# Patient Record
Sex: MALE | Race: OTHER | Employment: FULL TIME | URBAN - METROPOLITAN AREA
[De-identification: names, ages, dates, MRNs, and addresses within clinical notes are randomized per-mention and may not be internally consistent; named-entity substitution may affect disease eponyms.]

---

## 2017-04-12 DIAGNOSIS — E11.65 TYPE 2 DIABETES MELLITUS WITH HYPERGLYCEMIA, WITHOUT LONG-TERM CURRENT USE OF INSULIN (HCC): ICD-10-CM

## 2017-04-12 DIAGNOSIS — E78.2 MIXED HYPERLIPIDEMIA: ICD-10-CM

## 2017-04-12 RX ORDER — ROSUVASTATIN CALCIUM 20 MG/1
20 TABLET, COATED ORAL NIGHTLY
Qty: 30 TABLET | Refills: 0 | Status: SHIPPED | OUTPATIENT
Start: 2017-04-12 | End: 2017-04-28

## 2017-04-12 RX ORDER — GLIPIZIDE 5 MG/1
TABLET ORAL
Qty: 60 TABLET | Refills: 0 | Status: SHIPPED | OUTPATIENT
Start: 2017-04-12 | End: 2017-04-28

## 2017-04-12 RX ORDER — BENAZEPRIL HYDROCHLORIDE 5 MG/1
TABLET, FILM COATED ORAL
Qty: 30 TABLET | Refills: 0 | Status: SHIPPED | OUTPATIENT
Start: 2017-04-12 | End: 2017-04-28

## 2017-04-12 NOTE — TELEPHONE ENCOUNTER
From: Tracie Babinski  To:  Sherly Arrieta DO  Sent: 4/12/2017 12:10 PM CDT  Subject: Medication Renewal Request    Original authorizing provider: Philbert Blunt, DO Tracie Babinski would like a refill of the following medications:  Benazepril HCl 5 MG Ora

## 2017-04-19 DIAGNOSIS — E89.0 POSTSURGICAL HYPOTHYROIDISM: Primary | ICD-10-CM

## 2017-04-19 DIAGNOSIS — E78.2 MIXED HYPERLIPIDEMIA: ICD-10-CM

## 2017-04-19 DIAGNOSIS — E11.65 TYPE 2 DIABETES MELLITUS WITH HYPERGLYCEMIA, WITHOUT LONG-TERM CURRENT USE OF INSULIN (HCC): ICD-10-CM

## 2017-04-19 DIAGNOSIS — Z12.5 SCREENING FOR PROSTATE CANCER: Primary | ICD-10-CM

## 2017-04-19 DIAGNOSIS — Z12.5 SCREENING FOR PROSTATE CANCER: ICD-10-CM

## 2017-04-19 RX ORDER — GLIPIZIDE 5 MG/1
TABLET ORAL
Qty: 60 TABLET | Refills: 0 | Status: SHIPPED | OUTPATIENT
Start: 2017-04-19 | End: 2017-04-28 | Stop reason: ALTCHOICE

## 2017-04-19 RX ORDER — BENAZEPRIL HYDROCHLORIDE 5 MG/1
TABLET, FILM COATED ORAL
Qty: 30 TABLET | Refills: 0 | Status: SHIPPED | OUTPATIENT
Start: 2017-04-19 | End: 2017-06-16

## 2017-04-19 RX ORDER — SITAGLIPTIN 100 MG/1
TABLET, FILM COATED ORAL
Qty: 30 TABLET | Refills: 0 | Status: SHIPPED | OUTPATIENT
Start: 2017-04-19 | End: 2017-04-28

## 2017-04-19 NOTE — TELEPHONE ENCOUNTER
Pt was last seen 6 months ago with Dr. Richi Valdes. OV needed for DM, chronic conditions. Please contact patient for this appt. Only approved meds for 1 month until seen. Thanks    Labs are in the system for him as well for [facility]. If you will let him know.

## 2017-04-28 ENCOUNTER — OFFICE VISIT (OUTPATIENT)
Dept: FAMILY MEDICINE CLINIC | Facility: CLINIC | Age: 52
End: 2017-04-28

## 2017-04-28 VITALS
SYSTOLIC BLOOD PRESSURE: 118 MMHG | WEIGHT: 212.63 LBS | DIASTOLIC BLOOD PRESSURE: 72 MMHG | BODY MASS INDEX: 31.49 KG/M2 | HEART RATE: 92 BPM | HEIGHT: 69 IN

## 2017-04-28 DIAGNOSIS — E66.09 NON MORBID OBESITY DUE TO EXCESS CALORIES: ICD-10-CM

## 2017-04-28 DIAGNOSIS — E78.6 LOW HDL (UNDER 40): ICD-10-CM

## 2017-04-28 DIAGNOSIS — E78.2 MIXED HYPERLIPIDEMIA: ICD-10-CM

## 2017-04-28 DIAGNOSIS — Z12.11 SCREENING FOR MALIGNANT NEOPLASM OF COLON: ICD-10-CM

## 2017-04-28 DIAGNOSIS — E11.65 TYPE 2 DIABETES MELLITUS WITH HYPERGLYCEMIA, WITHOUT LONG-TERM CURRENT USE OF INSULIN (HCC): Primary | ICD-10-CM

## 2017-04-28 PROCEDURE — 99214 OFFICE O/P EST MOD 30 MIN: CPT | Performed by: FAMILY MEDICINE

## 2017-04-28 RX ORDER — ROSUVASTATIN CALCIUM 20 MG/1
20 TABLET, FILM COATED ORAL NIGHTLY
Qty: 90 TABLET | Refills: 0 | Status: SHIPPED | OUTPATIENT
Start: 2017-04-28 | End: 2017-09-07

## 2017-04-28 NOTE — PROGRESS NOTES
HPI:   Kimani Allred is a 49year old male    Diabetes: Worsening. Patient seen by Dr. Adelia Buckley with [facility] October 8, 2016: No diabetic retinopathy.   Patient states he has been indulgent with his diet and has not been exercising regularly a TABLET BY MOUTH ONCE DAILY Disp: 30 tablet Rfl: 0   SYNTHROID 137 MCG Oral Tab Take 137 mcg by mouth before breakfast. Disp: 30 tablet Rfl: 5   Cinnamon 500 MG Oral Cap Take 500 mg by mouth daily. Disp:  Rfl:    Cranberry 125 MG Oral Tab Take by mouth.  Dis Papillary thyroid carcinoma measuring 0.9 cm in the right lobe - T1NxMx - Stage I   • Postsurgical hypothyroidism      Dx in 12/2011 - total thyroidectomy for thyroid cancer   • Nontoxic multinodular goiter      Dx in 9/2011: thyroid U/S showed a dominant diabetic exam is normal, visualized feet and the appearance is normal.  Bilateral monofilament/sensation of both feet is normal.  Pulsation pedal pulse exam of both lower legs/feet is normal as well.   PSYCH:  Affect normal, normal rate of speech      DATA: Bun/Creatinine Ratio      6 - 22 (calc) NOT APPLICABLE   SODIUM, SERUM      135 - 146 mmol/L 137   POTASSIUM, SERUM      3.5 - 5.3 mmol/L 4.4   CHLORIDE, SERUM      98 - 110 mmol/L 102   CARBON DIOXIDE      20 - 31 mmol/L 24   CALCIUM      8.6 - 10.3 mg/ Jardiance in place of Invokana. Discontinue glipizide. Continue Crestor. Patient to intensify therapeutic lifestyle changes.    - SITagliptin Phosphate (JANUVIA) 100 MG Oral Tab; Take 1 tablet (100 mg total) by mouth daily. Dispense: 90 tablet;  Refill: mouth nightly. Empagliflozin (JARDIANCE) 25 MG Oral Tab 30 tablet 2      Sig: Take 25 tablets by mouth every morning.       Liraglutide (VICTOZA) 18 MG/3ML Subcutaneous Solution Pen-injector 1 pen 2      Sig: Start 0.6mg SC qd x1 week, then 1.2mg SC qd

## 2017-05-01 NOTE — PATIENT INSTRUCTIONS
Eating Out When You Have Diabetes  Eating right is an important part of keeping your blood sugar in your target range. You just need to make healthy choices.     Tips for restaurant meals  When you eat away from home try these tips:  · Try to schedule you · Steamed rice or boiled noodles. One serving is equal to 1/3 cup. Date Last Reviewed: 6/1/2016  © 6292-3091 The [address] [place] [address], [address] [place] [address]. All rights reserved.  This information is not intended as a substitute for pr

## 2017-05-26 ENCOUNTER — PATIENT MESSAGE (OUTPATIENT)
Dept: FAMILY MEDICINE CLINIC | Facility: CLINIC | Age: 52
End: 2017-05-26

## 2017-05-26 ENCOUNTER — TELEPHONE (OUTPATIENT)
Dept: FAMILY MEDICINE CLINIC | Facility: CLINIC | Age: 52
End: 2017-05-26

## 2017-05-26 DIAGNOSIS — E11.65 TYPE 2 DIABETES MELLITUS WITH HYPERGLYCEMIA, WITHOUT LONG-TERM CURRENT USE OF INSULIN (HCC): Primary | ICD-10-CM

## 2017-05-31 NOTE — TELEPHONE ENCOUNTER
Spoke to [employer] and pt never picked up the script sent on 4/28/17 so re-sent to [facility] per pt.

## 2017-06-16 RX ORDER — BENAZEPRIL HYDROCHLORIDE 5 MG/1
TABLET, FILM COATED ORAL
Qty: 30 TABLET | Refills: 2 | Status: SHIPPED | OUTPATIENT
Start: 2017-06-16 | End: 2017-07-21

## 2017-07-09 DIAGNOSIS — E11.65 TYPE 2 DIABETES MELLITUS WITH HYPERGLYCEMIA, WITHOUT LONG-TERM CURRENT USE OF INSULIN (HCC): ICD-10-CM

## 2017-07-10 NOTE — TELEPHONE ENCOUNTER
From: Tracie Babinski  Sent: 7/9/2017 8:19 PM CDT  Subject: Medication Renewal Request    Cr Villagomez.  Hamilton Mckenna would like a refill of the following medications:  Empagliflozin (Ambrose Ryan) 25 MG Oral Tab Gaurav Garcia DO]    Preferred pharmacy: [facility]

## 2017-07-21 DIAGNOSIS — E11.65 TYPE 2 DIABETES MELLITUS WITH HYPERGLYCEMIA, WITHOUT LONG-TERM CURRENT USE OF INSULIN (HCC): ICD-10-CM

## 2017-07-26 RX ORDER — BENAZEPRIL HYDROCHLORIDE 5 MG/1
5 TABLET, FILM COATED ORAL DAILY
Qty: 30 TABLET | Refills: 2 | Status: SHIPPED
Start: 2017-07-26 | End: 2017-11-13

## 2017-08-26 DIAGNOSIS — E11.65 TYPE 2 DIABETES MELLITUS WITH HYPERGLYCEMIA, WITHOUT LONG-TERM CURRENT USE OF INSULIN (HCC): ICD-10-CM

## 2017-08-28 ENCOUNTER — TELEPHONE (OUTPATIENT)
Dept: FAMILY MEDICINE CLINIC | Facility: CLINIC | Age: 52
End: 2017-08-28

## 2017-08-28 DIAGNOSIS — E11.65 TYPE 2 DIABETES MELLITUS WITH HYPERGLYCEMIA, WITHOUT LONG-TERM CURRENT USE OF INSULIN (HCC): Primary | ICD-10-CM

## 2017-08-28 RX ORDER — SITAGLIPTIN 100 MG/1
TABLET, FILM COATED ORAL
Qty: 30 TABLET | Refills: 0 | Status: SHIPPED | OUTPATIENT
Start: 2017-08-28 | End: 2017-08-29

## 2017-08-28 NOTE — TELEPHONE ENCOUNTER
Parminder Bella approved qty 30 NR  Patient past due for annual wellness and DM  Please call to schedule appt -will need for further refills  [phone] (home)

## 2017-08-28 NOTE — TELEPHONE ENCOUNTER
[facility] has a appt to see Dr Gilberto Patterson on Sept 15th he would like his bloodwork orders put in for [facility], he also would like a message sent to him via my chart to let him know when he can have them done.

## 2017-08-29 DIAGNOSIS — E11.65 TYPE 2 DIABETES MELLITUS WITH HYPERGLYCEMIA, WITHOUT LONG-TERM CURRENT USE OF INSULIN (HCC): ICD-10-CM

## 2017-09-01 DIAGNOSIS — E11.65 TYPE 2 DIABETES MELLITUS WITH HYPERGLYCEMIA, WITHOUT LONG-TERM CURRENT USE OF INSULIN (HCC): ICD-10-CM

## 2017-09-01 NOTE — TELEPHONE ENCOUNTER
From: Staci Presley  Sent: 9/1/2017 1:05 PM CDT  Subject: Medication Renewal Request    Millicent Gramajo. Dedra Fang would like a refill of the following medications: SITagliptin Phosphate (JANUVIA) 100 MG Oral Tab Bearl Skiff, DO]    Preferred pharmacy: [facility] [address] [place] [address], [phone], [phone]    Comment:  Please I need a 90 days refill for this medication today. I understand that you had approved for 30 days but my insurance does not cover 30 days RX. And, I am not going to pay full price. [place], sent a request on Wednesday to your attention and I have not received a confirmation. Please advise I do not have more of this RX. Regards. Marlo Fang.

## 2017-09-03 LAB
CHOL/HDLC RATIO: 4.9 (CALC)
CHOLESTEROL, TOTAL: 183 MG/DL
HDL CHOLESTEROL: 37 MG/DL
HEMOGLOBIN A1C: 9.5 % OF TOTAL HGB
LDL-CHOLESTEROL: 110 MG/DL (CALC)
NON-HDL CHOLESTEROL: 146 MG/DL (CALC)
TRIGLYCERIDES: 253 MG/DL

## 2017-09-06 DIAGNOSIS — E78.2 MIXED HYPERLIPIDEMIA: ICD-10-CM

## 2017-09-06 DIAGNOSIS — E11.65 TYPE 2 DIABETES MELLITUS WITH HYPERGLYCEMIA, WITHOUT LONG-TERM CURRENT USE OF INSULIN (HCC): ICD-10-CM

## 2017-09-06 DIAGNOSIS — E78.6 LOW HDL (UNDER 40): ICD-10-CM

## 2017-09-06 RX ORDER — ROSUVASTATIN CALCIUM 20 MG/1
20 TABLET, FILM COATED ORAL NIGHTLY
Qty: 90 TABLET | Refills: 0 | Status: CANCELLED
Start: 2017-09-06

## 2017-09-07 RX ORDER — ROSUVASTATIN CALCIUM 20 MG/1
20 TABLET, FILM COATED ORAL NIGHTLY
Qty: 30 TABLET | Refills: 0 | Status: SHIPPED | OUTPATIENT
Start: 2017-09-07 | End: 2017-10-28

## 2017-09-07 NOTE — TELEPHONE ENCOUNTER
From: Finn Blanchard  Sent: 9/6/2017 8:54 PM CDT  Subject: Medication Renewal Request    Faith Butler He would like a refill of the following medications:  CRESTOR 20 MG Oral Tab Pricilla Curiel, ]    Preferred pharmacy: [address] [place] [address] Dr Amrik Alex

## 2017-10-28 ENCOUNTER — TELEPHONE (OUTPATIENT)
Dept: FAMILY MEDICINE CLINIC | Facility: CLINIC | Age: 52
End: 2017-10-28

## 2017-10-28 DIAGNOSIS — E78.2 MIXED HYPERLIPIDEMIA: ICD-10-CM

## 2017-10-28 DIAGNOSIS — E11.65 TYPE 2 DIABETES MELLITUS WITH HYPERGLYCEMIA, WITHOUT LONG-TERM CURRENT USE OF INSULIN (HCC): ICD-10-CM

## 2017-10-28 DIAGNOSIS — E78.6 LOW HDL (UNDER 40): ICD-10-CM

## 2017-10-30 DIAGNOSIS — E11.65 TYPE 2 DIABETES MELLITUS WITH HYPERGLYCEMIA, WITHOUT LONG-TERM CURRENT USE OF INSULIN (HCC): ICD-10-CM

## 2017-10-30 RX ORDER — ROSUVASTATIN CALCIUM 20 MG/1
TABLET, FILM COATED ORAL
Qty: 30 TABLET | Refills: 0 | Status: SHIPPED | OUTPATIENT
Start: 2017-10-30 | End: 2017-11-13

## 2017-10-30 NOTE — TELEPHONE ENCOUNTER
rx approved qty 30 NR  Future Appointments  Date Time Provider Marga Johanne   11/13/2017 3:00 PM David Park DO [facility] [place]

## 2017-10-30 NOTE — TELEPHONE ENCOUNTER
From: Carlos Jaramillo  Sent: 10/30/2017 1:45 PM CDT  Subject: Medication Renewal Request    Denver Camel. Zelphia Harbour would like a refill of the following medications:     Empagliflozin (JARDIANCE) 25 MG Oral Tab Denae García DO]    Preferred pharmacy: [address] [place] [address]

## 2017-10-30 NOTE — TELEPHONE ENCOUNTER
crestor approved qty 30 NR  Patient past due for ov  Please call to schedule appt-will need for further refills  [phone] (home)

## 2017-11-01 ENCOUNTER — TELEPHONE (OUTPATIENT)
Dept: FAMILY MEDICINE CLINIC | Facility: CLINIC | Age: 52
End: 2017-11-01

## 2017-11-01 NOTE — TELEPHONE ENCOUNTER
Received fax from pharmacy that the insurance will no longer cover the brand Crestor    Ok to change to generic?  thanks

## 2017-11-13 ENCOUNTER — OFFICE VISIT (OUTPATIENT)
Dept: FAMILY MEDICINE CLINIC | Facility: CLINIC | Age: 52
End: 2017-11-13

## 2017-11-13 VITALS
HEIGHT: 69 IN | DIASTOLIC BLOOD PRESSURE: 74 MMHG | BODY MASS INDEX: 28.64 KG/M2 | SYSTOLIC BLOOD PRESSURE: 118 MMHG | RESPIRATION RATE: 16 BRPM | WEIGHT: 193.38 LBS | HEART RATE: 92 BPM

## 2017-11-13 DIAGNOSIS — E11.65 TYPE 2 DIABETES MELLITUS WITH HYPERGLYCEMIA, WITHOUT LONG-TERM CURRENT USE OF INSULIN (HCC): Primary | ICD-10-CM

## 2017-11-13 DIAGNOSIS — E78.2 MIXED HYPERLIPIDEMIA: ICD-10-CM

## 2017-11-13 DIAGNOSIS — E78.6 LOW HDL (UNDER 40): ICD-10-CM

## 2017-11-13 PROCEDURE — 93000 ELECTROCARDIOGRAM COMPLETE: CPT | Performed by: FAMILY MEDICINE

## 2017-11-13 PROCEDURE — 99214 OFFICE O/P EST MOD 30 MIN: CPT | Performed by: FAMILY MEDICINE

## 2017-11-13 RX ORDER — ROSUVASTATIN CALCIUM 20 MG/1
20 TABLET, COATED ORAL NIGHTLY
Qty: 90 TABLET | Refills: 1 | Status: SHIPPED | OUTPATIENT
Start: 2017-11-13 | End: 2018-01-04

## 2017-11-13 RX ORDER — ROSUVASTATIN CALCIUM 20 MG/1
20 TABLET, COATED ORAL NIGHTLY
COMMUNITY
End: 2017-11-13

## 2017-11-13 RX ORDER — BENAZEPRIL HYDROCHLORIDE 5 MG/1
5 TABLET, FILM COATED ORAL DAILY
Qty: 90 TABLET | Refills: 1 | Status: SHIPPED | OUTPATIENT
Start: 2017-11-13 | End: 2018-04-06

## 2017-11-13 NOTE — PATIENT INSTRUCTIONS
Diabetes: Meal Planning    You can help keep your blood sugar level in your target range by eating healthy foods. Your healthcare team can help you create a low-fat, nutritious meal plan.  Take an active role in your diabetes management by following your · Protein is important for building and repairing muscles and bones. Choose low-fat protein sources, such as fish, egg whites, and skinless chicken.   Reduce liquid sugars  Extra calories from sodas, sports drinks, and fruit drinks make it hard to keep bloo

## 2017-11-13 NOTE — PROGRESS NOTES
HPI:   Timmy Briseno is a 47year old male    DMII:  Worsening. Off the Victoza for several months. Patient states he has been taking the Loreli Belch and metformin as instructed but may sometimes miss doses.   Admits he could be eating more healthfu mouth daily.  Disp:  Rfl:    Omeprazole 40 MG Oral Capsule Delayed Release TAKE ONE CAPSULE BY MOUTH ONCE DAILY PRN Disp: 30 capsule Rfl: 2   clotrimazole (LOTRIMIN) 1 % Apply Externally Cream Apply to affected area twice a day (Patient taking differently: Alcohol use: Yes           1.2 - 1.8 oz/week     Cans of beer: 2 - 3 per week    Exercise: sometimes bikes or runs, walks at work a great deal.  Diet: somewhat watches diet     REVIEW OF SYSTEMS:   Constitutional: feels well overall  H Type 2 diabetes mellitus with hyperglycemia, without long-term current use of insulin (hcc)  (primary encounter diagnosis)  Mixed hyperlipidemia  Low hdl (under 40)    ECG:  NORMAL SINUS RHYTHM. NORMAL INTERVALS  NORMAL        1.  Type 2 diabetes veronica tablet (5 mg total) by mouth daily. Dispense: 90 tablet; Refill: 1  - ELECTROCARDIOGRAM, COMPLETE    2. Mixed hyperlipidemia  Lipids not to goal.  Triglycerides are elevated, LDL is elevated, HDL is too low. Patient to avoid missing doses of the Crestor. Benazepril HCl 5 MG Oral Tab 90 tablet 1      Sig: Take 1 tablet (5 mg total) by mouth daily.            Imaging & Consults:  OPHTHALMOLOGY - INTERNAL  OP REFERRAL INADEQUATE GLYCEMIC CONTROL/CHANGE TREATMENT 3 HRS  ELECTROCARDIOGRAM, COMPLETE  Return

## 2017-11-25 ENCOUNTER — TELEPHONE (OUTPATIENT)
Dept: FAMILY MEDICINE CLINIC | Facility: CLINIC | Age: 52
End: 2017-11-25

## 2017-11-25 DIAGNOSIS — K21.9 GASTROESOPHAGEAL REFLUX DISEASE, ESOPHAGITIS PRESENCE NOT SPECIFIED: ICD-10-CM

## 2017-11-27 RX ORDER — OMEPRAZOLE 40 MG/1
CAPSULE, DELAYED RELEASE ORAL
Qty: 90 CAPSULE | Refills: 0 | Status: SHIPPED | OUTPATIENT
Start: 2017-11-27 | End: 2019-05-03

## 2017-11-27 NOTE — TELEPHONE ENCOUNTER
Called pt again. lmom for pt with information from Dr. Yaneth Portillo and contact information as well for Dr. Portillo Dickey. Asked pt after reviewing message to call office with any questions.

## 2017-11-27 NOTE — TELEPHONE ENCOUNTER
Please call pt re use of omeprazole, he has been using the omeprazole for a while now and I don't see a consultation with GI in his chart.   Long term use of this medication can decrease absorption of important nutrients that can lead to osteoporosis and ot

## 2017-11-29 DIAGNOSIS — E11.65 TYPE 2 DIABETES MELLITUS WITH HYPERGLYCEMIA, WITHOUT LONG-TERM CURRENT USE OF INSULIN (HCC): ICD-10-CM

## 2017-11-30 NOTE — TELEPHONE ENCOUNTER
From: Bryan Orellana  Sent: 11/29/2017 8:53 PM CST  Subject: Medication Renewal Request    Cathryne Scheuermann.  Brenden Patterson would like a refill of the following medications:     Empagliflozin (Martinez Vitor) 25 MG Oral Tab Katya Cook DO]    Preferred pharmacy: [address] [place] [address], [address] [place] [address] [phone], [phone]

## 2017-12-20 ENCOUNTER — TELEPHONE (OUTPATIENT)
Dept: FAMILY MEDICINE CLINIC | Facility: CLINIC | Age: 52
End: 2017-12-20

## 2017-12-20 NOTE — TELEPHONE ENCOUNTER
C-Peptide result 4.8, received from [facility].  Lm on private voicemail, per signed consent, with recommendations to decrease carb intake at least <100 gm daily. Pt advised to call the office with any questions or concerns.

## 2017-12-28 DIAGNOSIS — E78.2 MIXED HYPERLIPIDEMIA: ICD-10-CM

## 2017-12-28 DIAGNOSIS — E11.65 TYPE 2 DIABETES MELLITUS WITH HYPERGLYCEMIA, WITHOUT LONG-TERM CURRENT USE OF INSULIN (HCC): ICD-10-CM

## 2017-12-28 DIAGNOSIS — E78.6 LOW HDL (UNDER 40): ICD-10-CM

## 2017-12-28 RX ORDER — ROSUVASTATIN CALCIUM 20 MG/1
20 TABLET, COATED ORAL NIGHTLY
Qty: 90 TABLET | Refills: 1 | Status: CANCELLED
Start: 2017-12-28

## 2017-12-29 NOTE — TELEPHONE ENCOUNTER
From: Betty Abebe  Sent: 12/28/2017 9:04 PM CST  Subject: Medication Renewal [address]uniga would like a refill of the following medications:     Empagliflozin (Elease Angelucci) 25 MG Oral Tab Dino Ovalle DO]   Patient Comment: Under my new RX p

## 2018-01-03 ENCOUNTER — PATIENT OUTREACH (OUTPATIENT)
Dept: FAMILY MEDICINE CLINIC | Facility: CLINIC | Age: 53
End: 2018-01-03

## 2018-01-04 RX ORDER — ROSUVASTATIN CALCIUM 20 MG/1
20 TABLET, COATED ORAL NIGHTLY
Qty: 90 TABLET | Refills: 0 | Status: SHIPPED | OUTPATIENT
Start: 2018-01-04 | End: 2018-04-06

## 2018-01-29 DIAGNOSIS — E11.65 TYPE 2 DIABETES MELLITUS WITH HYPERGLYCEMIA, WITHOUT LONG-TERM CURRENT USE OF INSULIN (HCC): ICD-10-CM

## 2018-01-29 NOTE — TELEPHONE ENCOUNTER
From: Gil Atkinson  Sent: 1/29/2018 12:22 PM CST  Subject: Medication Renewal Request    Rishabh Car.  Sherrie Valverde would like a refill of the following medications:     Liraglutide (VICTOZA) 18 MG/3ML Subcutaneous Solution Pen-injector Sofie Simon, DO]   Patient

## 2018-03-05 ENCOUNTER — TELEPHONE (OUTPATIENT)
Dept: FAMILY MEDICINE CLINIC | Facility: CLINIC | Age: 53
End: 2018-03-05

## 2018-04-06 ENCOUNTER — OFFICE VISIT (OUTPATIENT)
Dept: FAMILY MEDICINE CLINIC | Facility: CLINIC | Age: 53
End: 2018-04-06

## 2018-04-06 VITALS
WEIGHT: 194.81 LBS | RESPIRATION RATE: 16 BRPM | HEIGHT: 69.5 IN | DIASTOLIC BLOOD PRESSURE: 82 MMHG | SYSTOLIC BLOOD PRESSURE: 112 MMHG | BODY MASS INDEX: 28.2 KG/M2 | HEART RATE: 98 BPM

## 2018-04-06 DIAGNOSIS — C73 PAPILLARY THYROID CARCINOMA (HCC): ICD-10-CM

## 2018-04-06 DIAGNOSIS — E89.0 POSTSURGICAL HYPOTHYROIDISM: ICD-10-CM

## 2018-04-06 DIAGNOSIS — E11.65 TYPE 2 DIABETES MELLITUS WITH HYPERGLYCEMIA, WITHOUT LONG-TERM CURRENT USE OF INSULIN (HCC): Primary | ICD-10-CM

## 2018-04-06 DIAGNOSIS — E78.6 LOW HDL (UNDER 40): ICD-10-CM

## 2018-04-06 DIAGNOSIS — E78.2 MIXED HYPERLIPIDEMIA: ICD-10-CM

## 2018-04-06 DIAGNOSIS — Z12.11 SCREENING FOR MALIGNANT NEOPLASM OF COLON: ICD-10-CM

## 2018-04-06 PROCEDURE — 99214 OFFICE O/P EST MOD 30 MIN: CPT | Performed by: FAMILY MEDICINE

## 2018-04-06 RX ORDER — BENAZEPRIL HYDROCHLORIDE 5 MG/1
5 TABLET, FILM COATED ORAL DAILY
Qty: 90 TABLET | Refills: 1 | Status: SHIPPED | OUTPATIENT
Start: 2018-04-06 | End: 2018-08-21

## 2018-04-06 RX ORDER — ROSUVASTATIN CALCIUM 20 MG/1
20 TABLET, COATED ORAL NIGHTLY
Qty: 90 TABLET | Refills: 1 | Status: SHIPPED | OUTPATIENT
Start: 2018-04-06 | End: 2018-08-21

## 2018-04-06 NOTE — PROGRESS NOTES
HPI:   Finn Blanchard is a 47year old male    DMII:  Uncontrolled, but improving. Back on the Victoza. Taking Januvia, Invokana and metformin. No side effects noticed. Trying to eat better. Exercise: treadmill 30 mins 3 times per week.     Hyperlipidemi Box Rfl: 2   Omega-3 Fatty Acids (FISH OIL OR) Take by mouth daily.  Disp:  Rfl:    Glucose Blood (ONETOUCH ULTRA BLUE VI) Check blood sugars twice a day Disp:  Rfl:    Coenzyme Q10 (CO Q-10) 200 MG Oral Cap Take 1 tablet daily Disp:  Rfl:         Adhesive or runs, walks at work a great deal.  Diet: somewhat watches diet     REVIEW OF SYSTEMS:   Constitutional: feels well overall  HENT: Denies changes in vision  SKIN: denies any unusual skin lesions or rashes  RESPIRATORY: denies shortness of breath with exe hyperglycemia, without long-term current use of insulin (hcc)  (primary encounter diagnosis)  Mixed hyperlipidemia  Low hdl (under 40)  Papillary thyroid carcinoma (hcc)  Postsurgical hypothyroidism  Screening for malignant neoplasm of colon    1.  Type 2 d hypothyroidism  Continue levothyroxine as prescribed by endocrinologist.    6. Screening for malignant neoplasm of colon  - SURGERY - INTERNAL            Orders Placed This Encounter      Hemoglobin A1C      Lipid Panel      Hemoglobin A1C [E]      Lipid P

## 2018-04-10 NOTE — PATIENT INSTRUCTIONS
Diabetes: Shopping for and Preparing Meals    Having diabetes doesn’t mean you have to shop in a special aisle or look for special foods. But you will need to make choices.  By comparing items and reading food labels, you can find the healthiest foods for · Compare items and decide which is the best for your health needs. · Track the number of carbohydrates in your portions.   · Figure out how many servings of a food you can have and still stay within the number of carbohydrates for that meal.  Planning tino · Instead of cream-based sauces or sugary glazes, flavor foods with vegetable purée, lemon or lime juice, or herb seasonings. · Remove skin from chicken and turkey before serving. · Look in cookbooks for easy, low-fat, low-sugar recipes.  When making your

## 2018-04-18 ENCOUNTER — TELEPHONE (OUTPATIENT)
Dept: FAMILY MEDICINE CLINIC | Facility: CLINIC | Age: 53
End: 2018-04-18

## 2018-04-18 DIAGNOSIS — E11.65 TYPE 2 DIABETES MELLITUS WITH HYPERGLYCEMIA, WITHOUT LONG-TERM CURRENT USE OF INSULIN (HCC): ICD-10-CM

## 2018-04-18 RX ORDER — GLUCOSAMINE HCL/CHONDROITIN SU 500-400 MG
CAPSULE ORAL
Qty: 200 STRIP | Refills: 1 | Status: SHIPPED | OUTPATIENT
Start: 2018-04-18 | End: 2021-05-28 | Stop reason: ALTCHOICE

## 2018-04-18 NOTE — TELEPHONE ENCOUNTER
[facility] called and states that the Victoza needs to be 90 days per insurance. Refill re submitted for 90 days.

## 2018-04-18 NOTE — TELEPHONE ENCOUNTER
Medical records release, signed by patient, successfully faxed to [facility] requesting most recent dilated eye exam.

## 2018-05-24 DIAGNOSIS — E11.65 TYPE 2 DIABETES MELLITUS WITH HYPERGLYCEMIA, WITHOUT LONG-TERM CURRENT USE OF INSULIN (HCC): ICD-10-CM

## 2018-05-24 RX ORDER — PEN NEEDLE, DIABETIC 32 GX 1/4"
NEEDLE, DISPOSABLE MISCELLANEOUS
Qty: 100 EACH | Refills: 1 | Status: SHIPPED | OUTPATIENT
Start: 2018-05-24 | End: 2018-12-02

## 2018-06-09 ENCOUNTER — PATIENT OUTREACH (OUTPATIENT)
Dept: FAMILY MEDICINE CLINIC | Facility: CLINIC | Age: 53
End: 2018-06-09

## 2018-07-14 LAB
AMB EXT CHOLESTEROL, TOTAL: 195 MG/DL
AMB EXT HDL CHOLESTEROL: 39 MG/DL
AMB EXT HGBA1C: 8.8 %
AMB EXT LDL CHOLESTEROL, DIRECT: 99 MG/DL
AMB EXT TRIGLYCERIDES: 283 MG/DL
AMB EXT VLDL: 57 MG/DL

## 2018-07-22 DIAGNOSIS — E11.65 TYPE 2 DIABETES MELLITUS WITH HYPERGLYCEMIA, WITHOUT LONG-TERM CURRENT USE OF INSULIN (HCC): ICD-10-CM

## 2018-07-23 RX ORDER — LIRAGLUTIDE 6 MG/ML
INJECTION SUBCUTANEOUS
Qty: 3 PEN | Refills: 0 | Status: SHIPPED | OUTPATIENT
Start: 2018-07-23 | End: 2018-07-31

## 2018-07-23 NOTE — TELEPHONE ENCOUNTER
Jose Rubi approved qty 30 days NR  Patient is past due for 3 month f/u for DM and needs to complete labs  Please call to schedule appt and remind patient to complete fasting labs prior to appt  [phone] (home)

## 2018-07-30 ENCOUNTER — TELEPHONE (OUTPATIENT)
Dept: FAMILY MEDICINE CLINIC | Facility: CLINIC | Age: 53
End: 2018-07-30

## 2018-07-30 DIAGNOSIS — E11.65 TYPE 2 DIABETES MELLITUS WITH HYPERGLYCEMIA, WITHOUT LONG-TERM CURRENT USE OF INSULIN (HCC): ICD-10-CM

## 2018-07-30 NOTE — TELEPHONE ENCOUNTER
Patrica Barraza made a appt to see Dr Yas Castellano on Aug 21st, he will be out of his Victoza by then, he needs a 90 day supply or his insurance will not cover it, he only has 2 days left, please call his victoza over to his pharmacy on file

## 2018-07-31 NOTE — TELEPHONE ENCOUNTER
[facility] requesting 90 day refill on Vict[facility] states if pt does not get Victoza today he will be out.

## 2018-07-31 NOTE — TELEPHONE ENCOUNTER
rx has been sent for 90 days  Future Appointments  Date Time Provider Marga Whiting   8/21/2018 4:00 PM Silvano Ruiz DO [facility] [place]   5/22/2019 10:30 AM MD Lord Enid Branham   5/22/2019 11:40 AM MD GINI Cannon

## 2018-08-21 ENCOUNTER — OFFICE VISIT (OUTPATIENT)
Dept: FAMILY MEDICINE CLINIC | Facility: CLINIC | Age: 53
End: 2018-08-21
Payer: COMMERCIAL

## 2018-08-21 VITALS
RESPIRATION RATE: 16 BRPM | SYSTOLIC BLOOD PRESSURE: 118 MMHG | DIASTOLIC BLOOD PRESSURE: 68 MMHG | WEIGHT: 194 LBS | BODY MASS INDEX: 28.73 KG/M2 | HEIGHT: 69 IN | HEART RATE: 103 BPM

## 2018-08-21 DIAGNOSIS — K21.9 GASTROESOPHAGEAL REFLUX DISEASE, ESOPHAGITIS PRESENCE NOT SPECIFIED: ICD-10-CM

## 2018-08-21 DIAGNOSIS — E78.6 LOW HDL (UNDER 40): ICD-10-CM

## 2018-08-21 DIAGNOSIS — E78.2 MIXED HYPERLIPIDEMIA: ICD-10-CM

## 2018-08-21 DIAGNOSIS — E11.65 TYPE 2 DIABETES MELLITUS WITH HYPERGLYCEMIA, WITHOUT LONG-TERM CURRENT USE OF INSULIN (HCC): Primary | ICD-10-CM

## 2018-08-21 DIAGNOSIS — J30.9 ALLERGIC RHINITIS, UNSPECIFIED SEASONALITY, UNSPECIFIED TRIGGER: ICD-10-CM

## 2018-08-21 DIAGNOSIS — R10.11 RIGHT UPPER QUADRANT ABDOMINAL PAIN: ICD-10-CM

## 2018-08-21 DIAGNOSIS — R10.13 EPIGASTRIC PAIN: ICD-10-CM

## 2018-08-21 PROCEDURE — 99214 OFFICE O/P EST MOD 30 MIN: CPT | Performed by: FAMILY MEDICINE

## 2018-08-21 RX ORDER — BENAZEPRIL HYDROCHLORIDE 5 MG/1
5 TABLET, FILM COATED ORAL DAILY
Qty: 90 TABLET | Refills: 3 | Status: SHIPPED | OUTPATIENT
Start: 2018-08-21 | End: 2019-08-10

## 2018-08-21 RX ORDER — ROSUVASTATIN CALCIUM 20 MG/1
20 TABLET, COATED ORAL NIGHTLY
Qty: 90 TABLET | Refills: 1 | Status: SHIPPED | OUTPATIENT
Start: 2018-08-21 | End: 2019-08-10

## 2018-08-21 RX ORDER — LEVOTHYROXINE SODIUM 137 UG/1
137 TABLET ORAL
Qty: 90 TABLET | Refills: 3 | Status: CANCELLED | OUTPATIENT
Start: 2018-08-21

## 2018-08-21 NOTE — PATIENT INSTRUCTIONS
Let Dr. Sadie Munson know if your insurance covers Trulicity and if you would like to switch to Trulicity in place of Victoza. You can try MCT oil to help with increasing your HDL.

## 2018-08-21 NOTE — PROGRESS NOTES
HPI:   Carlos Jaramillo is a 47year old male    DMII:  Worsening, uncontrolled. .  Associated with dyslipidemia. Patient taking Victoza, metformin and Invokana. Tolerating medications well, denies missing doses of medication.   Patient is interested in once a meals. Disp: 180 tablet Rfl: 1   Rosuvastatin Calcium 20 MG Oral Tab Take 1 tablet (20 mg total) by mouth nightly. Disp: 90 tablet Rfl: 1   SITagliptin Phosphate (JANUVIA) 100 MG Oral Tab Take 1 tablet (100 mg total) by mouth daily.  Disp: 90 tablet Rfl: 1 Papillary thyroid carcinoma measuring 0.9 cm in the right lobe - T1NxMx - Stage I   • Postsurgical hypothyroidism     Dx in 12/2011 - total thyroidectomy for thyroid cancer   • Type 2 diabetes mellitus with hyperglycemia, without long-term current use of i NBS, no masses. Mild tenderness to palpation of epigastrium, no rebound rigidity or guarding.   EXTREMITIES: no cyanosis, clubbing or edema; 2+ dorsalis pedis pulses  NEURO: Alert and oriented x3  Bilateral barefoot skin diabetic exam is normal, visualized INJECT 1.8MG UNDER THE SKIN ONCE A DAY. IF TREATMENT INTERRUPTED FOR MORE THAN 3DAYS START AT 0.6MG  Dispense: 9 pen; Refill: 1  - Benazepril HCl 5 MG Oral Tab; Take 1 tablet (5 mg total) by mouth daily. Dispense: 90 tablet;  Refill: 3  - Canagliflozin (IN Encounter      Lipid Panel [E]      Hemoglobin A1C [E]    Meds & Refills for this Visit:    Signed Prescriptions Disp Refills    Liraglutide (VICTOZA) 18 MG/3ML Subcutaneous Solution Pen-injector 9 pen 1      Sig: INJECT 1.8MG UNDER THE SKIN ONCE A DAY.  IF

## 2018-08-22 ENCOUNTER — MED REC SCAN ONLY (OUTPATIENT)
Dept: FAMILY MEDICINE CLINIC | Facility: CLINIC | Age: 53
End: 2018-08-22

## 2018-08-23 PROBLEM — R10.11 RIGHT UPPER QUADRANT ABDOMINAL PAIN: Status: ACTIVE | Noted: 2018-08-23

## 2018-08-23 PROBLEM — R10.13 EPIGASTRIC PAIN: Status: ACTIVE | Noted: 2018-08-23

## 2018-09-14 ENCOUNTER — PATIENT OUTREACH (OUTPATIENT)
Dept: FAMILY MEDICINE CLINIC | Facility: CLINIC | Age: 53
End: 2018-09-14

## 2018-09-14 DIAGNOSIS — E11.65 TYPE 2 DIABETES MELLITUS WITH HYPERGLYCEMIA, WITHOUT LONG-TERM CURRENT USE OF INSULIN (HCC): Primary | ICD-10-CM

## 2018-10-25 DIAGNOSIS — E11.65 TYPE 2 DIABETES MELLITUS WITH HYPERGLYCEMIA, WITHOUT LONG-TERM CURRENT USE OF INSULIN (HCC): ICD-10-CM

## 2018-11-11 DIAGNOSIS — E11.65 TYPE 2 DIABETES MELLITUS WITH HYPERGLYCEMIA, WITHOUT LONG-TERM CURRENT USE OF INSULIN (HCC): ICD-10-CM

## 2018-11-12 RX ORDER — BENAZEPRIL HYDROCHLORIDE 5 MG/1
5 TABLET, FILM COATED ORAL DAILY
Qty: 90 TABLET | Refills: 1 | OUTPATIENT
Start: 2018-11-12

## 2018-12-02 DIAGNOSIS — E11.65 TYPE 2 DIABETES MELLITUS WITH HYPERGLYCEMIA, WITHOUT LONG-TERM CURRENT USE OF INSULIN (HCC): ICD-10-CM

## 2018-12-03 RX ORDER — PEN NEEDLE, DIABETIC 32 GX 1/4"
NEEDLE, DISPOSABLE MISCELLANEOUS
Qty: 100 EACH | Refills: 1 | Status: SHIPPED | OUTPATIENT
Start: 2018-12-03 | End: 2019-07-30

## 2018-12-06 ENCOUNTER — OFFICE VISIT (OUTPATIENT)
Dept: FAMILY MEDICINE CLINIC | Facility: CLINIC | Age: 53
End: 2018-12-06
Payer: COMMERCIAL

## 2018-12-06 VITALS
BODY MASS INDEX: 28 KG/M2 | DIASTOLIC BLOOD PRESSURE: 76 MMHG | SYSTOLIC BLOOD PRESSURE: 118 MMHG | HEART RATE: 102 BPM | WEIGHT: 189 LBS

## 2018-12-06 DIAGNOSIS — E78.6 LOW HDL (UNDER 40): ICD-10-CM

## 2018-12-06 DIAGNOSIS — E78.5 DYSLIPIDEMIA: ICD-10-CM

## 2018-12-06 DIAGNOSIS — E11.65 TYPE 2 DIABETES MELLITUS WITH HYPERGLYCEMIA, WITHOUT LONG-TERM CURRENT USE OF INSULIN (HCC): Primary | ICD-10-CM

## 2018-12-06 DIAGNOSIS — E78.2 MIXED HYPERLIPIDEMIA: ICD-10-CM

## 2018-12-06 PROCEDURE — 99214 OFFICE O/P EST MOD 30 MIN: CPT | Performed by: FAMILY MEDICINE

## 2018-12-06 PROCEDURE — 90670 PCV13 VACCINE IM: CPT | Performed by: FAMILY MEDICINE

## 2018-12-06 PROCEDURE — 90471 IMMUNIZATION ADMIN: CPT | Performed by: FAMILY MEDICINE

## 2018-12-06 NOTE — PROGRESS NOTES
HPI:   Carlos Jaramillo is a 47year old male    Type 2 diabetes:  Uncontrolled. Patient denies any known complications to his diabetes. Patient using Victoza, denies missing doses.   Patient taking Invokana 300 mg in the morning, Januvia 100 mg daily, and me SITagliptin Phosphate (JANUVIA) 100 MG Oral Tab Take 1 tablet (100 mg total) by mouth daily.  Disp: 90 tablet Rfl: 1   Levothyroxine Sodium 137 MCG Oral Tab Take 137 mcg by mouth every morning before breakfast. Disp: 90 tablet Rfl: 3   OMEPRAZOLE 40 MG Or • Vitamin D deficiency     Dx in 2/2013      Past Surgical History:   Procedure Laterality Date   • OTHER SURGICAL HISTORY      Surgery for veins in the legs   • THYROIDECTOMY      On 12/19/2011: Total thyroidectomy for thyroid cancer by Dr. Jaimee Mosquera ([address]  Uncontrolled. Suspect diet is playing a large role in this patient's uncontrolled diabetes. Continue Victoza, Invokana 300 mg daily, metformin 1000 mg twice a day, and Januvia 100 mg daily. Check C-peptide.   Consider basal insulin in the very suyapa

## 2018-12-13 PROBLEM — E78.5 DYSLIPIDEMIA: Status: ACTIVE | Noted: 2018-12-13

## 2018-12-28 ENCOUNTER — TELEPHONE (OUTPATIENT)
Dept: FAMILY MEDICINE CLINIC | Facility: CLINIC | Age: 53
End: 2018-12-28

## 2018-12-28 NOTE — TELEPHONE ENCOUNTER
Doc,    As of Jan 1st the pt's plan will no longer cover the Invokana tabs but will cover either [place] or Jardiance.       Can pt be switched to one of the above on Jan. 1st? Has to be dated Jan. 1st or the pharmacy will send over \" needs prior auth fax\

## 2018-12-28 NOTE — TELEPHONE ENCOUNTER
Spoke to patient with instructions and pt verbalized understanding. He also would like his C-peptide results today as well. Copy of result given to Dr. Gilberto Patterson    Discussed with Dr. Gilberto Patterson level is elevated.   She will be doing some research to see if

## 2018-12-28 NOTE — TELEPHONE ENCOUNTER
Please instruct patient that when he runs out of the [address] [place] [address] he can start taking the Jardiance. We will have him start on Jardiance 10 mg daily before breakfast for 14 days, please have patient  samples.   After taking the 14 days of the [facility]

## 2019-01-15 ENCOUNTER — TELEPHONE (OUTPATIENT)
Dept: FAMILY MEDICINE CLINIC | Facility: CLINIC | Age: 54
End: 2019-01-15

## 2019-01-23 DIAGNOSIS — E11.65 TYPE 2 DIABETES MELLITUS WITH HYPERGLYCEMIA, WITHOUT LONG-TERM CURRENT USE OF INSULIN (HCC): ICD-10-CM

## 2019-01-28 ENCOUNTER — TELEPHONE (OUTPATIENT)
Dept: FAMILY MEDICINE CLINIC | Facility: CLINIC | Age: 54
End: 2019-01-28

## 2019-01-28 NOTE — TELEPHONE ENCOUNTER
Spoke to patient and he wants to know if he needs to be changed from the Victoza because it was thought that it might of caused the elevated C peptide (see TE from 12/28/18) and he said he never heard back about any research that was looked into.     Pt peter

## 2019-01-28 NOTE — TELEPHONE ENCOUNTER
Yes the Victoza may be why the C peptide is a little elevated, this is okay and not worrisome. He should continue to use the Victoza.

## 2019-01-28 NOTE — TELEPHONE ENCOUNTER
Danny Velazquez would like someone from the office to call him regarding his Chaneta Dys, he has a couple of questions regarding his Harsh Filler that he is previously taking.  Please call Danny Velazquez at [phone]

## 2019-01-29 NOTE — TELEPHONE ENCOUNTER
Left message for patient per consent with Dr Sonam Ricci notes below and recommendation to continue victoza

## 2019-02-02 DIAGNOSIS — E11.65 TYPE 2 DIABETES MELLITUS WITH HYPERGLYCEMIA, WITHOUT LONG-TERM CURRENT USE OF INSULIN (HCC): ICD-10-CM

## 2019-02-04 RX ORDER — SITAGLIPTIN 100 MG/1
TABLET, FILM COATED ORAL
Qty: 90 TABLET | Refills: 0 | Status: SHIPPED | OUTPATIENT
Start: 2019-02-04 | End: 2019-07-11

## 2019-04-01 DIAGNOSIS — E11.65 TYPE 2 DIABETES MELLITUS WITH HYPERGLYCEMIA, WITHOUT LONG-TERM CURRENT USE OF INSULIN (HCC): ICD-10-CM

## 2019-04-02 ENCOUNTER — TELEPHONE (OUTPATIENT)
Dept: FAMILY MEDICINE CLINIC | Facility: CLINIC | Age: 54
End: 2019-04-02

## 2019-04-02 DIAGNOSIS — E11.65 TYPE 2 DIABETES MELLITUS WITH HYPERGLYCEMIA, WITHOUT LONG-TERM CURRENT USE OF INSULIN (HCC): ICD-10-CM

## 2019-04-02 NOTE — TELEPHONE ENCOUNTER
Rx faxed over saying that patient needs 90 day refill of the Victoza. Sent over 30 day refill today    Please advise.

## 2019-04-21 DIAGNOSIS — E11.65 TYPE 2 DIABETES MELLITUS WITH HYPERGLYCEMIA, WITHOUT LONG-TERM CURRENT USE OF INSULIN (HCC): ICD-10-CM

## 2019-04-24 ENCOUNTER — PATIENT OUTREACH (OUTPATIENT)
Dept: FAMILY MEDICINE CLINIC | Facility: CLINIC | Age: 54
End: 2019-04-24

## 2019-05-03 ENCOUNTER — TELEPHONE (OUTPATIENT)
Dept: FAMILY MEDICINE CLINIC | Facility: CLINIC | Age: 54
End: 2019-05-03

## 2019-05-03 DIAGNOSIS — K21.9 GASTROESOPHAGEAL REFLUX DISEASE, ESOPHAGITIS PRESENCE NOT SPECIFIED: ICD-10-CM

## 2019-05-03 RX ORDER — OMEPRAZOLE 40 MG/1
CAPSULE, DELAYED RELEASE ORAL
Qty: 90 CAPSULE | Refills: 0 | Status: SHIPPED | OUTPATIENT
Start: 2019-05-03 | End: 2019-05-07

## 2019-05-07 ENCOUNTER — TELEPHONE (OUTPATIENT)
Dept: FAMILY MEDICINE CLINIC | Facility: CLINIC | Age: 54
End: 2019-05-07

## 2019-05-07 DIAGNOSIS — K21.9 GASTROESOPHAGEAL REFLUX DISEASE, ESOPHAGITIS PRESENCE NOT SPECIFIED: Primary | ICD-10-CM

## 2019-05-07 NOTE — TELEPHONE ENCOUNTER
[facility] stating Omeprazole is not covered but Nexium is and Esomeprazole so [facility]

## 2019-05-09 RX ORDER — ESOMEPRAZOLE MAGNESIUM 40 MG/1
40 CAPSULE, DELAYED RELEASE ORAL
Qty: 30 CAPSULE | Refills: 1 | Status: SHIPPED | OUTPATIENT
Start: 2019-05-09 | End: 2020-06-05

## 2019-05-09 NOTE — TELEPHONE ENCOUNTER
Okay for prescription for Nexium 40 mg p.o. daily #30 with 1 refill. Patient was referred to GI last year for chronic use/need for this medication, chart reviewed and there are no visits with GI found.   Please instruct patient to make an appointment to se

## 2019-05-09 NOTE — TELEPHONE ENCOUNTER
Med sent to pharmacy. lmom for pt that he will need to see GI for further refills of this medication. Referral in system and number left for Dr. Kisha Scott office [phone]. Asked pt after reviewing message to call office with any questions.

## 2019-05-14 ENCOUNTER — TELEPHONE (OUTPATIENT)
Dept: FAMILY MEDICINE CLINIC | Facility: CLINIC | Age: 54
End: 2019-05-14

## 2019-05-14 NOTE — TELEPHONE ENCOUNTER
So called [facility] to do a PA on the esomeprazole and was told that this med is a plan exclusion.   lmom for pt explaining this and he needs to contact Dr. Yamilka Hawkins office for an appt and again left the contact information in case he did not receive the

## 2019-05-30 ENCOUNTER — TELEPHONE (OUTPATIENT)
Dept: FAMILY MEDICINE CLINIC | Facility: CLINIC | Age: 54
End: 2019-05-30

## 2019-05-30 DIAGNOSIS — E11.65 TYPE 2 DIABETES MELLITUS WITH HYPERGLYCEMIA, WITHOUT LONG-TERM CURRENT USE OF INSULIN (HCC): Primary | ICD-10-CM

## 2019-05-30 DIAGNOSIS — Z12.5 SCREENING FOR MALIGNANT NEOPLASM OF PROSTATE: ICD-10-CM

## 2019-05-30 NOTE — TELEPHONE ENCOUNTER
Marcio Liao made a appt to see Dr Michelle López in June for his diabetic f/u he would like a copy of his bloodwork orders sent to his home at [address] [address].  Questions or concerns please call Marcio Liao at [phone]

## 2019-06-06 ENCOUNTER — MED REC SCAN ONLY (OUTPATIENT)
Dept: FAMILY MEDICINE CLINIC | Facility: CLINIC | Age: 54
End: 2019-06-06

## 2019-06-24 ENCOUNTER — TELEPHONE (OUTPATIENT)
Dept: FAMILY MEDICINE CLINIC | Facility: CLINIC | Age: 54
End: 2019-06-24

## 2019-06-24 NOTE — TELEPHONE ENCOUNTER
Patrica Barraza made a appt to see Dr Yas Castellano on Aug 10th, he would like to know if Dr Yas Castellano received his lab work results from [facility]'s, if not he would like a call so he can call them an resend them to the office please call Patrica Barraza at [phone]

## 2019-07-11 DIAGNOSIS — E11.65 TYPE 2 DIABETES MELLITUS WITH HYPERGLYCEMIA, WITHOUT LONG-TERM CURRENT USE OF INSULIN (HCC): ICD-10-CM

## 2019-07-15 RX ORDER — SITAGLIPTIN 100 MG/1
TABLET, FILM COATED ORAL
Qty: 30 TABLET | Refills: 0 | Status: SHIPPED | OUTPATIENT
Start: 2019-07-15 | End: 2019-08-10

## 2019-07-15 NOTE — TELEPHONE ENCOUNTER
Pt requesting refill of Hemantuvia,  30 day sent to pharmacy. Last Time Medication was Filled:  2/4/2019  3 months supply     Last Office Visit with PCP: 12/06/2018    Per AVS:  Return in about 4 weeks (around 1/3/2019) for Diabetes and as needed.       6/ none

## 2019-07-19 DIAGNOSIS — E11.65 TYPE 2 DIABETES MELLITUS WITH HYPERGLYCEMIA, WITHOUT LONG-TERM CURRENT USE OF INSULIN (HCC): ICD-10-CM

## 2019-07-19 NOTE — TELEPHONE ENCOUNTER
Pt requesting refill of metformin, refill approved, sent to pharmacy      Last Office Visit with PCP: 12/2018 and has follow up scheduled 8/10/2019

## 2019-07-30 DIAGNOSIS — E11.65 TYPE 2 DIABETES MELLITUS WITH HYPERGLYCEMIA, WITHOUT LONG-TERM CURRENT USE OF INSULIN (HCC): ICD-10-CM

## 2019-07-31 RX ORDER — PEN NEEDLE, DIABETIC 32 GX 1/4"
NEEDLE, DISPOSABLE MISCELLANEOUS
Qty: 100 EACH | Refills: 1 | Status: SHIPPED | OUTPATIENT
Start: 2019-07-31 | End: 2020-06-05

## 2019-08-02 ENCOUNTER — TELEPHONE (OUTPATIENT)
Dept: FAMILY MEDICINE CLINIC | Facility: CLINIC | Age: 54
End: 2019-08-02

## 2019-08-02 RX ORDER — PEN NEEDLE, DIABETIC 30 GX3/16"
1 NEEDLE, DISPOSABLE MISCELLANEOUS
Qty: 30 EACH | Refills: 0 | Status: SHIPPED | OUTPATIENT
Start: 2019-08-02 | End: 2020-06-05

## 2019-08-02 RX ORDER — PEN NEEDLE, DIABETIC 30 GX3/16"
1 NEEDLE, DISPOSABLE MISCELLANEOUS
Qty: 30 EACH | Refills: 0 | Status: SHIPPED | OUTPATIENT
Start: 2019-08-30 | End: 2021-09-01

## 2019-08-02 NOTE — TELEPHONE ENCOUNTER
Received fax from pharmacy stating  Victoza is no longer covered by plan. Per [facility] these are the alternatives:  Bydureon, Byetta, Ozempic, Trulicity. Please advise.

## 2019-08-02 NOTE — TELEPHONE ENCOUNTER
Please call patient and let him know that insurance will no longer be covering the Victoza. A new prescription was sent in for the starting dose of Ozempic to replace the Victoza. Please inform patient the Ozempic is once a week (not once a day).   He w

## 2019-08-10 ENCOUNTER — OFFICE VISIT (OUTPATIENT)
Dept: FAMILY MEDICINE CLINIC | Facility: CLINIC | Age: 54
End: 2019-08-10
Payer: COMMERCIAL

## 2019-08-10 VITALS
BODY MASS INDEX: 27.11 KG/M2 | HEIGHT: 69 IN | HEART RATE: 93 BPM | WEIGHT: 183 LBS | DIASTOLIC BLOOD PRESSURE: 72 MMHG | SYSTOLIC BLOOD PRESSURE: 124 MMHG

## 2019-08-10 DIAGNOSIS — E66.3 OVERWEIGHT (BMI 25.0-29.9): ICD-10-CM

## 2019-08-10 DIAGNOSIS — Z12.11 SCREENING FOR MALIGNANT NEOPLASM OF COLON: ICD-10-CM

## 2019-08-10 DIAGNOSIS — E78.6 LOW HDL (UNDER 40): ICD-10-CM

## 2019-08-10 DIAGNOSIS — E78.2 MIXED HYPERLIPIDEMIA: ICD-10-CM

## 2019-08-10 DIAGNOSIS — Z79.899 ENCOUNTER FOR LONG-TERM CURRENT USE OF MEDICATION: ICD-10-CM

## 2019-08-10 DIAGNOSIS — E11.65 TYPE 2 DIABETES MELLITUS WITH HYPERGLYCEMIA, WITHOUT LONG-TERM CURRENT USE OF INSULIN (HCC): Primary | ICD-10-CM

## 2019-08-10 PROBLEM — R10.13 EPIGASTRIC PAIN: Status: RESOLVED | Noted: 2018-08-23 | Resolved: 2019-08-10

## 2019-08-10 PROBLEM — R10.11 RIGHT UPPER QUADRANT ABDOMINAL PAIN: Status: RESOLVED | Noted: 2018-08-23 | Resolved: 2019-08-10

## 2019-08-10 PROCEDURE — 99214 OFFICE O/P EST MOD 30 MIN: CPT | Performed by: FAMILY MEDICINE

## 2019-08-10 RX ORDER — ROSUVASTATIN CALCIUM 20 MG/1
20 TABLET, COATED ORAL NIGHTLY
Qty: 90 TABLET | Refills: 3 | Status: SHIPPED | OUTPATIENT
Start: 2019-08-10 | End: 2020-08-26

## 2019-08-10 NOTE — PROGRESS NOTES
HPI:   Kenya Costa is a 47year old male      Type 2 Diabetes:  Fastin-145  2 hour pp: 203  Patient completed labs in last couple of months, however we do not have the results. Patient currently taking Jardiance, metformin, Januvia and Victoza.   No 30 each Rfl: 0   [START ON 8/30/2019] Insulin Pen Needle (PEN NEEDLES) 32G X 4 MM Does not apply Misc 1 each by Does not apply route every 7 days.  Disp: 30 each Rfl: 0   BD PEN NEEDLE MICRO U/F 32G X 6 MM Does not apply Misc USE AS DIRECTED EVERY DAY WITH disturbances 6/27/11   • Papillary thyroid carcinoma ([facility] [address]     Dx in 12/2011: Papillary thyroid carcinoma measuring 0.9 cm in the right lobe - T1NxMx - Stage I   • Postsurgical hypothyroidism     Dx in 12/2011 - total thyroidectomy for thyroid cancer   • R cyanosis, clubbing or edema; 2+ dorsalis pedis pulses  NEURO: Alert and oriented x3  Bilateral barefoot skin diabetic exam is normal, visualized feet and the appearance is normal.  Bilateral monofilament/sensation of both feet is normal.  Pulsation pedal p results. Continue rosuvastatin 20 mg nightly for now. - Rosuvastatin Calcium 20 MG Oral Tab; Take 1 tablet (20 mg total) by mouth nightly. Dispense: 90 tablet; Refill: 3      4. Overweight (BMI 25.0-29. 9)  TLC discussed.     5. Encounter for long-term 03/06/2019  PSA due on 04/25/2019  Return in about 3 months (around 11/10/2019) for Diabetes and hyperlipidemia.

## 2019-09-05 DIAGNOSIS — E11.65 TYPE 2 DIABETES MELLITUS WITH HYPERGLYCEMIA, WITHOUT LONG-TERM CURRENT USE OF INSULIN (HCC): ICD-10-CM

## 2019-09-05 RX ORDER — BENAZEPRIL HYDROCHLORIDE 5 MG/1
TABLET, FILM COATED ORAL
Qty: 90 TABLET | Refills: 0 | Status: SHIPPED | OUTPATIENT
Start: 2019-09-05 | End: 2019-11-21

## 2019-09-05 NOTE — TELEPHONE ENCOUNTER
Pt requesting refill of BENAZEPRIL HCL 5 MG Oral Tab, refill approved, sent to pharmacy:     Last Time Medication was Filled: 8/21/2018 [id number]     Last Office Visit with PCP: 8/10/2019.  Return in about 3 months (around 11/10/2019) for Diabetes and hyper

## 2019-11-07 ENCOUNTER — TELEPHONE (OUTPATIENT)
Dept: FAMILY MEDICINE CLINIC | Facility: CLINIC | Age: 54
End: 2019-11-07

## 2019-11-07 DIAGNOSIS — Z12.11 SCREENING FOR MALIGNANT NEOPLASM OF COLON: ICD-10-CM

## 2019-11-07 DIAGNOSIS — E11.65 TYPE 2 DIABETES MELLITUS WITH HYPERGLYCEMIA, WITHOUT LONG-TERM CURRENT USE OF INSULIN (HCC): Primary | ICD-10-CM

## 2019-11-07 DIAGNOSIS — Z12.5 SCREENING FOR MALIGNANT NEOPLASM OF PROSTATE: ICD-10-CM

## 2019-11-07 NOTE — TELEPHONE ENCOUNTER
Patient called needing labs ordered switched to [address] and copy mailed to him per his request.

## 2019-11-10 DIAGNOSIS — E11.65 TYPE 2 DIABETES MELLITUS WITH HYPERGLYCEMIA, WITHOUT LONG-TERM CURRENT USE OF INSULIN (HCC): Primary | ICD-10-CM

## 2019-11-11 RX ORDER — SEMAGLUTIDE 1.34 MG/ML
INJECTION, SOLUTION SUBCUTANEOUS
Qty: 1.5 PEN | Refills: 0 | Status: SHIPPED | OUTPATIENT
Start: 2019-11-11 | End: 2019-12-16

## 2019-11-11 NOTE — TELEPHONE ENCOUNTER
Pt requesting refill of Ozempic passed protocol , refill approved, sent to pharmacy:     Last Time Medication was Filled:  8/30/19     Last Office Visit with PCP: 8/10/19. Return in about 3 months (around 11/10/2019) for Diabetes and hyperlipidemia.

## 2019-11-21 ENCOUNTER — OFFICE VISIT (OUTPATIENT)
Dept: FAMILY MEDICINE CLINIC | Facility: CLINIC | Age: 54
End: 2019-11-21
Payer: COMMERCIAL

## 2019-11-21 ENCOUNTER — TELEPHONE (OUTPATIENT)
Dept: FAMILY MEDICINE CLINIC | Facility: CLINIC | Age: 54
End: 2019-11-21

## 2019-11-21 VITALS
TEMPERATURE: 99 F | SYSTOLIC BLOOD PRESSURE: 108 MMHG | HEIGHT: 69 IN | OXYGEN SATURATION: 97 % | HEART RATE: 98 BPM | DIASTOLIC BLOOD PRESSURE: 80 MMHG | BODY MASS INDEX: 26.57 KG/M2 | WEIGHT: 179.38 LBS | RESPIRATION RATE: 18 BRPM

## 2019-11-21 DIAGNOSIS — Z00.00 ROUTINE GENERAL MEDICAL EXAMINATION AT A HEALTH CARE FACILITY: Primary | ICD-10-CM

## 2019-11-21 DIAGNOSIS — E66.3 OVERWEIGHT (BMI 25.0-29.9): ICD-10-CM

## 2019-11-21 DIAGNOSIS — E78.5 DYSLIPIDEMIA: ICD-10-CM

## 2019-11-21 DIAGNOSIS — E11.65 TYPE 2 DIABETES MELLITUS WITH HYPERGLYCEMIA, WITHOUT LONG-TERM CURRENT USE OF INSULIN (HCC): ICD-10-CM

## 2019-11-21 DIAGNOSIS — Z79.899 ENCOUNTER FOR LONG-TERM CURRENT USE OF MEDICATION: ICD-10-CM

## 2019-11-21 PROCEDURE — 99214 OFFICE O/P EST MOD 30 MIN: CPT | Performed by: FAMILY MEDICINE

## 2019-11-21 PROCEDURE — 99396 PREV VISIT EST AGE 40-64: CPT | Performed by: FAMILY MEDICINE

## 2019-11-21 RX ORDER — EMPAGLIFLOZIN 25 MG/1
TABLET, FILM COATED ORAL
COMMUNITY
Start: 2019-11-16 | End: 2020-09-04

## 2019-11-21 RX ORDER — BENAZEPRIL HYDROCHLORIDE 5 MG/1
5 TABLET, FILM COATED ORAL NIGHTLY
Qty: 90 TABLET | Refills: 3 | Status: SHIPPED | OUTPATIENT
Start: 2019-11-21 | End: 2020-06-05

## 2019-11-21 NOTE — TELEPHONE ENCOUNTER
MA sent fax to [place] [address] in [place] requesting recent DM eye exam. Patient signed medical release form.

## 2019-11-21 NOTE — PROGRESS NOTES
Matthew Beaulieu is a 49year old male   HPI:         Type 2 Diabetes:  Uncontrolled, worsening. Patient currently taking Jardiance, metformin, Januvia and Ozempic. No side effects to medications noticed. Diet: Admits diet could be better.   Exercise: Doing Rosuvastatin Calcium 20 MG Oral Tab Take 1 tablet (20 mg total) by mouth nightly. 90 tablet 3   • Insulin Pen Needle (PEN NEEDLES) 32G X 4 MM Does not apply Misc 1 each by Does not apply route every 7 days.  30 each 0   • Insulin Pen Needle (PEN NEEDLES) 32 • Right upper quadrant abdominal pain 8/23/2018   • Type 2 diabetes mellitus with hyperglycemia, without long-term current use of insulin ([facility] [address] 9/27/2016   • Type II or unspecified type diabetes mellitus without mention of complication, not stated as unc rhinitis/asthma    EXAM:   /80 (BP Location: Left arm, Patient Position: Sitting, Cuff Size: adult)   Pulse 98   Temp 98.8 °F (37.1 °C) (Oral)   Resp 18   Ht 69\"   Wt 179 lb 6 oz (81.4 kg)   SpO2 97%   BMI 26.49 kg/m²   Body mass index is 26.49 kg/m 38 (L) 42 39 43   Triglycerides      <150 mg/dL 187 (H) 260 283 206   LDL Cholesterol Calc      mg/dL (calc) 88 88     CHOL/HDLC RATIO      <5.0 (calc) 4.1      NON-HDL CHOLESTEROL      <130 mg/dL (calc) 116      DIRECT LDL      mg/dL   99 112   VLDL LIPID PANEL; Future    4. Overweight (BMI 25.0-29. 9)  Recommend healthy diet including green leafy vegetables, fresh fruits and protein.   Aerobic exercise 30 minutes five days a week for cardiovascular fitness and 45-60 minutes 6-7 days a week for weight l

## 2019-12-16 DIAGNOSIS — E11.65 TYPE 2 DIABETES MELLITUS WITH HYPERGLYCEMIA, WITHOUT LONG-TERM CURRENT USE OF INSULIN (HCC): ICD-10-CM

## 2019-12-16 RX ORDER — SEMAGLUTIDE 1.34 MG/ML
INJECTION, SOLUTION SUBCUTANEOUS
Qty: 1.5 PEN | Refills: 0 | Status: SHIPPED | OUTPATIENT
Start: 2019-12-16 | End: 2019-12-17

## 2019-12-16 NOTE — TELEPHONE ENCOUNTER
Pt requesting refill of OZEMPIC, 0.25 OR 0.5 MG/DOSE, 2 MG/1.5ML Subcutaneous Solution Pen-injector, refill approved, sent to pharmacy:     Last Time Medication was Filled:  11/11/19     Last Office Visit with PCP: 11/21/19.   Return in about 6 weeks (vi

## 2019-12-17 DIAGNOSIS — E11.65 TYPE 2 DIABETES MELLITUS WITH HYPERGLYCEMIA, WITHOUT LONG-TERM CURRENT USE OF INSULIN (HCC): ICD-10-CM

## 2019-12-17 RX ORDER — SEMAGLUTIDE 1.34 MG/ML
INJECTION, SOLUTION SUBCUTANEOUS
Qty: 3 PEN | Refills: 0 | Status: SHIPPED | OUTPATIENT
Start: 2019-12-17 | End: 2020-06-05

## 2020-02-08 ENCOUNTER — DIABETIC EDUCATION (OUTPATIENT)
Dept: ENDOCRINOLOGY CLINIC | Facility: CLINIC | Age: 55
End: 2020-02-08
Payer: COMMERCIAL

## 2020-02-08 DIAGNOSIS — E11.65 TYPE 2 DIABETES MELLITUS WITH HYPERGLYCEMIA, WITHOUT LONG-TERM CURRENT USE OF INSULIN (HCC): Primary | ICD-10-CM

## 2020-02-08 PROCEDURE — 97802 MEDICAL NUTRITION INDIV IN: CPT | Performed by: DIETITIAN, REGISTERED

## 2020-02-08 NOTE — PROGRESS NOTES
Tonny Winchester   8/24/1965 was seen for Diabetic Medical Nutrition Therapy:    2/8/2020  Referring Provider: Dr Carolyn Lobato  Start time: 9:00 End time: 10:00    Here with wife Reji Rincon.  Pt has reduced portions over the past year and started [address] and willing to make lifestyle changes to improve blood glucose control. Written materials provided for all topics covered. Patient verbalized understanding and has no further questions at this time. Contact information provided.   Thank you for the referra

## 2020-03-08 LAB
ALBUMIN/GLOBULIN RATIO: 1.4 (CALC) (ref 1–2.5)
ALBUMIN: 4.4 G/DL (ref 3.6–5.1)
ALKALINE PHOSPHATASE: 87 U/L (ref 35–144)
ALT: 19 U/L (ref 9–46)
AST: 13 U/L (ref 10–35)
BILIRUBIN, TOTAL: 0.5 MG/DL (ref 0.2–1.2)
BUN: 13 MG/DL (ref 7–25)
CALCIUM: 10 MG/DL (ref 8.6–10.3)
CARBON DIOXIDE: 29 MMOL/L (ref 20–32)
CHLORIDE: 104 MMOL/L (ref 98–110)
CHOL/HDLC RATIO: 3.9 (CALC)
CHOLESTEROL, TOTAL: 143 MG/DL
CREATININE, RANDOM URINE: 61 MG/DL (ref 20–320)
CREATININE: 0.92 MG/DL (ref 0.7–1.33)
EGFR IF AFRICN AM: 110 ML/MIN/1.73M2
EGFR IF NONAFRICN AM: 95 ML/MIN/1.73M2
GLOBULIN: 3.1 G/DL (CALC) (ref 1.9–3.7)
GLUCOSE: 176 MG/DL (ref 65–99)
HDL CHOLESTEROL: 37 MG/DL
HEMOGLOBIN A1C: 9 % OF TOTAL HGB
LDL-CHOLESTEROL: 76 MG/DL (CALC)
MICROALBUMIN/CREATININE RATIO, RANDOM URINE: 11 MCG/MG CREAT
MICROALBUMIN: 0.7 MG/DL
NON-HDL CHOLESTEROL: 106 MG/DL (CALC)
POTASSIUM: 4.5 MMOL/L (ref 3.5–5.3)
PROTEIN, TOTAL: 7.5 G/DL (ref 6.1–8.1)
SODIUM: 141 MMOL/L (ref 135–146)
TRIGLYCERIDES: 201 MG/DL

## 2020-03-13 PROBLEM — K64.2 THIRD DEGREE HEMORRHOIDS: Status: ACTIVE | Noted: 2020-03-13

## 2020-03-13 PROBLEM — K21.00 GASTROESOPHAGEAL REFLUX DISEASE WITH ESOPHAGITIS: Status: ACTIVE | Noted: 2020-03-13

## 2020-03-13 PROBLEM — K21.9 GASTROESOPHAGEAL REFLUX DISEASE WITHOUT ESOPHAGITIS: Status: ACTIVE | Noted: 2020-03-13

## 2020-03-13 PROBLEM — K63.5 POLYP OF COLON: Status: ACTIVE | Noted: 2020-03-13

## 2020-03-13 PROBLEM — Z12.11 SPECIAL SCREENING FOR MALIGNANT NEOPLASM OF COLON: Status: ACTIVE | Noted: 2020-03-13

## 2020-06-05 ENCOUNTER — OFFICE VISIT (OUTPATIENT)
Dept: FAMILY MEDICINE CLINIC | Facility: CLINIC | Age: 55
End: 2020-06-05
Payer: COMMERCIAL

## 2020-06-05 VITALS
HEIGHT: 69.69 IN | HEART RATE: 82 BPM | SYSTOLIC BLOOD PRESSURE: 120 MMHG | WEIGHT: 177.25 LBS | DIASTOLIC BLOOD PRESSURE: 68 MMHG | BODY MASS INDEX: 25.66 KG/M2 | TEMPERATURE: 99 F

## 2020-06-05 DIAGNOSIS — Z79.899 ENCOUNTER FOR LONG-TERM CURRENT USE OF MEDICATION: ICD-10-CM

## 2020-06-05 DIAGNOSIS — E11.65 TYPE 2 DIABETES MELLITUS WITH HYPERGLYCEMIA, WITHOUT LONG-TERM CURRENT USE OF INSULIN (HCC): Primary | ICD-10-CM

## 2020-06-05 DIAGNOSIS — E78.2 MIXED HYPERLIPIDEMIA: ICD-10-CM

## 2020-06-05 PROCEDURE — 99214 OFFICE O/P EST MOD 30 MIN: CPT | Performed by: FAMILY MEDICINE

## 2020-06-05 PROCEDURE — 83036 HEMOGLOBIN GLYCOSYLATED A1C: CPT | Performed by: FAMILY MEDICINE

## 2020-06-05 RX ORDER — BENAZEPRIL HYDROCHLORIDE 5 MG/1
5 TABLET, FILM COATED ORAL NIGHTLY
Qty: 90 TABLET | Refills: 3 | Status: SHIPPED | OUTPATIENT
Start: 2020-06-05 | End: 2021-05-28

## 2020-06-05 NOTE — PROGRESS NOTES
HPI:   Matthew Beaulieu is a 49year old male      No new changes.         Immunization History  Administered            Date(s) Administered    >=3 YRS TRI  MULTIDOSE VIAL (80182) FLU CLINIC                          10/27/2014      FLUZONE 3 Yrs+ Quad [facility] 2017    lower back pain it might be sciatic nerve.    • Bad breath 2013   • Balanitis 9/29/2015    Likely candidal secondary to diabetes    • Bleeding nose 2013    Loss of smell   • Blurred vision 2013    possibly diabetes   • BMI 31.0-31.9,adult 1/7/2016 Alcohol use: Not Currently      Alcohol/week: 2.0 - 3.0 standard drinks      Types: 2 - 3 Cans of beer per week      Comment: Family events  only    Drug use: No    Exercise: runs 2-4 miles twice a week.   Diet: doesn't watch and tries to watch his diet 138   POTASSIUM, SERUM      3.5 - 5.3 mmol/L 4.5 4.5   CHLORIDE, SERUM      98 - 110 mmol/L 104 103   CARBON DIOXIDE      20 - 32 mmol/L 29 29   CALCIUM      8.6 - 10.3 mg/dL 10.0 9.5   PROTEIN, TOTAL      6.1 - 8.1 g/dL 7.5 7.3   Albumin      3.6 - 5.1 g/ Dr. Adelina Gamez is not in network patient to call let me know we will refer to Dr. Noel Nolasco or other.    - Semaglutide,0.25 or 0.5MG/DOS, (OZEMPIC, 0.25 OR 0.5 MG/DOSE,) 2 MG/1.5ML Subcutaneous Solution Pen-injector; Inject 1 mg into the skin every 7 days. 3 months (around 9/5/2020) for Diabetes, we will recheck your A1c in the office at the time of your visit.

## 2020-06-29 ENCOUNTER — TELEPHONE (OUTPATIENT)
Dept: FAMILY MEDICINE CLINIC | Facility: CLINIC | Age: 55
End: 2020-06-29

## 2020-06-29 NOTE — TELEPHONE ENCOUNTER
MISSING/ILLEGIBLE INFORMATION ON RX     Drug:  Semaglutide (0.25 or 0.5MG/DOS) 2 MG/1.5ML Subcutaneous Solution Pen-injector    Sig: Inject 1 mg into the skin every 7 days.     Quanity: 3 Pre-filled Pen Syringe    PHARMACY COMMENTS:    MISSING/ILLEGIBLE INF

## 2020-08-26 ENCOUNTER — TELEPHONE (OUTPATIENT)
Dept: FAMILY MEDICINE CLINIC | Facility: CLINIC | Age: 55
End: 2020-08-26

## 2020-08-26 DIAGNOSIS — E11.65 TYPE 2 DIABETES MELLITUS WITH HYPERGLYCEMIA, WITHOUT LONG-TERM CURRENT USE OF INSULIN (HCC): ICD-10-CM

## 2020-08-26 DIAGNOSIS — Z79.899 ENCOUNTER FOR LONG-TERM CURRENT USE OF MEDICATION: ICD-10-CM

## 2020-08-26 DIAGNOSIS — E78.6 LOW HDL (UNDER 40): ICD-10-CM

## 2020-08-26 DIAGNOSIS — E78.2 MIXED HYPERLIPIDEMIA: ICD-10-CM

## 2020-08-26 RX ORDER — ROSUVASTATIN CALCIUM 20 MG/1
20 TABLET, COATED ORAL NIGHTLY
Qty: 90 TABLET | Refills: 3 | Status: SHIPPED | OUTPATIENT
Start: 2020-08-26 | End: 2021-05-28

## 2020-08-26 NOTE — TELEPHONE ENCOUNTER
Requested Prescriptions     Pending Prescriptions Disp Refills   • Rosuvastatin Calcium 20 MG Oral Tab 90 tablet 3     Sig: Take 1 tablet (20 mg total) by mouth nightly. Protocol met. Refill approved and sent to pharmacy.

## 2020-09-03 ENCOUNTER — PATIENT MESSAGE (OUTPATIENT)
Dept: FAMILY MEDICINE CLINIC | Facility: CLINIC | Age: 55
End: 2020-09-03

## 2020-09-04 ENCOUNTER — TELEPHONE (OUTPATIENT)
Dept: FAMILY MEDICINE CLINIC | Facility: CLINIC | Age: 55
End: 2020-09-04

## 2020-09-04 DIAGNOSIS — E11.65 TYPE 2 DIABETES MELLITUS WITH HYPERGLYCEMIA, WITHOUT LONG-TERM CURRENT USE OF INSULIN (HCC): ICD-10-CM

## 2020-09-04 DIAGNOSIS — Z79.899 ENCOUNTER FOR LONG-TERM CURRENT USE OF MEDICATION: ICD-10-CM

## 2020-09-04 RX ORDER — EMPAGLIFLOZIN 25 MG/1
25 TABLET, FILM COATED ORAL EVERY MORNING
Qty: 90 TABLET | Refills: 3 | Status: SHIPPED | OUTPATIENT
Start: 2020-09-04 | End: 2021-09-01

## 2020-09-04 NOTE — TELEPHONE ENCOUNTER
Patient should still be taking the Jardiance 25 mg p.o. every morning. It appears that there was an error of omission that it was not refilled at his last office visit. Kathleen Caballero has been e-scripted to his pharmacy via this encounter.

## 2020-09-04 NOTE — TELEPHONE ENCOUNTER
From: Derick Villanueva  To: Zhane Romano DO  Sent: 9/3/2020 7:59 PM CDT  Subject: Prescription Question    Jardiance 25 MG Tabs  Generic name: Delmon Caller more  This medication isn't available for refill through [facility] at this time.   Documented by

## 2020-09-04 NOTE — TELEPHONE ENCOUNTER
From: Carlos Jaramillo  To: Denae García DO  Sent: 9/3/2020  7:59 PM CDT  Subject: Prescription Question    Jardiance 25 MG Tabs  Generic name: [place] Octavio more  This medication isn't available for refill through [facility] at this time.   Documented by

## 2020-12-11 DIAGNOSIS — E11.65 TYPE 2 DIABETES MELLITUS WITH HYPERGLYCEMIA, WITHOUT LONG-TERM CURRENT USE OF INSULIN (HCC): ICD-10-CM

## 2020-12-11 DIAGNOSIS — Z79.899 ENCOUNTER FOR LONG-TERM CURRENT USE OF MEDICATION: ICD-10-CM

## 2020-12-11 RX ORDER — GLUCOSAMINE HCL/CHONDROITIN SU 500-400 MG
CAPSULE ORAL
Qty: 200 STRIP | Refills: 1 | Status: CANCELLED | OUTPATIENT
Start: 2020-12-11

## 2020-12-11 RX ORDER — EMPAGLIFLOZIN 25 MG/1
25 TABLET, FILM COATED ORAL EVERY MORNING
Qty: 90 TABLET | Refills: 3 | Status: CANCELLED | OUTPATIENT
Start: 2020-12-11

## 2020-12-14 ENCOUNTER — TELEPHONE (OUTPATIENT)
Dept: FAMILY MEDICINE CLINIC | Facility: CLINIC | Age: 55
End: 2020-12-14

## 2020-12-14 RX ORDER — SEMAGLUTIDE 1.34 MG/ML
1 INJECTION, SOLUTION SUBCUTANEOUS WEEKLY
Qty: 3 PEN | Refills: 0 | Status: SHIPPED | OUTPATIENT
Start: 2020-12-14 | End: 2021-01-05

## 2020-12-14 RX ORDER — BLOOD SUGAR DIAGNOSTIC
STRIP MISCELLANEOUS
Qty: 200 STRIP | Refills: 0 | Status: SHIPPED | OUTPATIENT
Start: 2020-12-14 | End: 2021-02-05

## 2020-12-14 RX ORDER — SEMAGLUTIDE 1.34 MG/ML
1 INJECTION, SOLUTION SUBCUTANEOUS
Qty: 3 PEN | Refills: 0 | Status: SHIPPED | OUTPATIENT
Start: 2020-12-14 | End: 2020-12-14 | Stop reason: DRUGHIGH

## 2020-12-14 NOTE — TELEPHONE ENCOUNTER
Glucose Blood (BLOOD GLUCOSE TEST) In Vitro Strip   Patient Comment:  It needs to be for meter ONETOUCH VERIO REFLECT model    OZEMPIC 1 MG DOSE PEN 08/24/2020 06/29/2020  3 mL     JARDIANCE 25 MG Oral Tab 90 tablet 3 9/4/2020     LOV 6/5/20   Return in abo

## 2020-12-14 NOTE — TELEPHONE ENCOUNTER
Requested Prescriptions     Signed Prescriptions Disp Refills   • Semaglutide, 1 MG/DOSE, (OZEMPIC, 1 MG/DOSE,) 2 MG/1.5ML Subcutaneous Solution Pen-injector 3 pen 0     Sig: Inject 1 mg into the skin once a week.      Authorizing Provider: Erendira Stockton

## 2020-12-15 ENCOUNTER — TELEPHONE (OUTPATIENT)
Dept: FAMILY MEDICINE CLINIC | Facility: CLINIC | Age: 55
End: 2020-12-15

## 2020-12-15 DIAGNOSIS — E78.2 MIXED HYPERLIPIDEMIA: Primary | ICD-10-CM

## 2020-12-15 DIAGNOSIS — E11.65 TYPE 2 DIABETES MELLITUS WITH HYPERGLYCEMIA, WITHOUT LONG-TERM CURRENT USE OF INSULIN (HCC): ICD-10-CM

## 2020-12-15 NOTE — TELEPHONE ENCOUNTER
Reach out call placed for appointment. Noticed during refill Last OV 6/5/20 and per per notes:    Return in about 3 months (around 9/5/2020) for Diabetes, we will recheck your A1c in the office at the time of your visit.     1.  Type 2 diabetes mellitus wi

## 2020-12-16 NOTE — TELEPHONE ENCOUNTER
On 2020 a lipid panel was ordered at the time of the patient's office visit for patient to complete, however this was not completed.   Since the lipid panel may be  via [facility], a new order was put in for patient to complete lipid panel and A1c via

## 2021-01-05 ENCOUNTER — TELEPHONE (OUTPATIENT)
Dept: FAMILY MEDICINE CLINIC | Facility: CLINIC | Age: 56
End: 2021-01-05

## 2021-01-05 DIAGNOSIS — E11.65 TYPE 2 DIABETES MELLITUS WITH HYPERGLYCEMIA, WITHOUT LONG-TERM CURRENT USE OF INSULIN (HCC): Primary | ICD-10-CM

## 2021-01-05 RX ORDER — SEMAGLUTIDE 1.34 MG/ML
1 INJECTION, SOLUTION SUBCUTANEOUS WEEKLY
Qty: 3 PEN | Refills: 0 | Status: SHIPPED | OUTPATIENT
Start: 2021-01-05 | End: 2021-01-22

## 2021-01-22 ENCOUNTER — OFFICE VISIT (OUTPATIENT)
Dept: FAMILY MEDICINE CLINIC | Facility: CLINIC | Age: 56
End: 2021-01-22
Payer: COMMERCIAL

## 2021-01-22 VITALS
BODY MASS INDEX: 26.33 KG/M2 | HEIGHT: 69 IN | SYSTOLIC BLOOD PRESSURE: 118 MMHG | HEART RATE: 84 BPM | OXYGEN SATURATION: 95 % | DIASTOLIC BLOOD PRESSURE: 74 MMHG | TEMPERATURE: 98 F | WEIGHT: 177.81 LBS

## 2021-01-22 DIAGNOSIS — Z76.89 ENCOUNTER FOR NEW MEDICATION PRESCRIPTION: ICD-10-CM

## 2021-01-22 DIAGNOSIS — E11.65 TYPE 2 DIABETES MELLITUS WITH HYPERGLYCEMIA, WITHOUT LONG-TERM CURRENT USE OF INSULIN (HCC): Primary | ICD-10-CM

## 2021-01-22 DIAGNOSIS — E78.5 DYSLIPIDEMIA: ICD-10-CM

## 2021-01-22 PROCEDURE — 3078F DIAST BP <80 MM HG: CPT | Performed by: FAMILY MEDICINE

## 2021-01-22 PROCEDURE — 99214 OFFICE O/P EST MOD 30 MIN: CPT | Performed by: FAMILY MEDICINE

## 2021-01-22 PROCEDURE — 3074F SYST BP LT 130 MM HG: CPT | Performed by: FAMILY MEDICINE

## 2021-01-22 PROCEDURE — 3008F BODY MASS INDEX DOCD: CPT | Performed by: FAMILY MEDICINE

## 2021-01-22 RX ORDER — INSULIN GLARGINE 100 [IU]/ML
INJECTION, SOLUTION SUBCUTANEOUS
Qty: 1 BOX | Refills: 1 | Status: SHIPPED | OUTPATIENT
Start: 2021-01-22 | End: 2021-01-27

## 2021-01-22 RX ORDER — OMEGA-3-ACID ETHYL ESTERS 1 G/1
1 CAPSULE, LIQUID FILLED ORAL DAILY
COMMUNITY

## 2021-01-22 RX ORDER — SEMAGLUTIDE 1.34 MG/ML
1 INJECTION, SOLUTION SUBCUTANEOUS WEEKLY
Qty: 3 PEN | Refills: 3 | Status: SHIPPED | OUTPATIENT
Start: 2021-01-22 | End: 2021-07-26

## 2021-01-22 NOTE — PROGRESS NOTES
HPI:   Timmy Briseno is a 48year old male    No new complaints.       Immunization History  Administered            Date(s) Administered    >=3 YRS TRI  MULTIDOSE VIAL (48007) FLU CLINIC                          10/27/2014      FLUZONE 6 months and older PF Coenzyme Q10 (CO Q-10) 100 MG Oral Cap 200 mg. Take 1 tablet daily        • insulin glargine (LANTUS SOLOSTAR) 100 UNIT/ML Subcutaneous Solution Pen-injector Start 2 to 3 units nightly, then titrate as directed.  1 Box 1        Adhesive Tape           RASH overall  HENT: Denies changes in vision  SKIN: denies any unusual skin lesions or rashes  RESPIRATORY: denies shortness of breath with exertion  CARDIOVASCULAR: denies chest pain on exertion  GI: denies abdominal pain and denies heartburn  EXTREMITIES: den is a 48year old male       Type 2 diabetes mellitus with hyperglycemia, without long-term current use of insulin (hcc)  (primary encounter diagnosis)  Dyslipidemia  Encounter for new medication prescription      1.  Type 2 diabetes mellitus with hyperglyce

## 2021-01-24 LAB — C-PEPTIDE: 1.75 NG/ML (ref 0.8–3.85)

## 2021-01-27 ENCOUNTER — TELEPHONE (OUTPATIENT)
Dept: FAMILY MEDICINE CLINIC | Facility: CLINIC | Age: 56
End: 2021-01-27

## 2021-01-27 DIAGNOSIS — E11.65 TYPE 2 DIABETES MELLITUS WITH HYPERGLYCEMIA, WITHOUT LONG-TERM CURRENT USE OF INSULIN (HCC): ICD-10-CM

## 2021-01-27 RX ORDER — INSULIN GLARGINE 100 [IU]/ML
INJECTION, SOLUTION SUBCUTANEOUS
Qty: 1 BOX | Refills: 1 | Status: SHIPPED | OUTPATIENT
Start: 2021-01-27 | End: 2021-08-02

## 2021-02-02 RX ORDER — PEN NEEDLE, DIABETIC 32GX 5/32"
NEEDLE, DISPOSABLE MISCELLANEOUS
Qty: 100 EACH | Refills: 1 | Status: SHIPPED | OUTPATIENT
Start: 2021-02-02 | End: 2021-08-02

## 2021-02-02 NOTE — TELEPHONE ENCOUNTER
RESPONSE REQUESTED:  PATIENT REQUESTS NEW RX     Drug:  BD UF GARCIA PEN NEEDLE [id number]    Sig: USE AS DIRECTED ONCE DAILY    Quanity: 100.0    PHARMACY COMMENTS:  PATIENT REQUESTS NEW RX: PT NEEDS TO GO WITH INSULIN PENS

## 2021-02-05 ENCOUNTER — TELEPHONE (OUTPATIENT)
Dept: ENDOCRINOLOGY CLINIC | Facility: CLINIC | Age: 56
End: 2021-02-05

## 2021-02-05 ENCOUNTER — NURSE ONLY (OUTPATIENT)
Dept: ENDOCRINOLOGY CLINIC | Facility: CLINIC | Age: 56
End: 2021-02-05
Payer: COMMERCIAL

## 2021-02-05 VITALS — WEIGHT: 179.19 LBS | BODY MASS INDEX: 26 KG/M2

## 2021-02-05 DIAGNOSIS — E11.65 TYPE 2 DIABETES MELLITUS WITH HYPERGLYCEMIA, WITHOUT LONG-TERM CURRENT USE OF INSULIN (HCC): ICD-10-CM

## 2021-02-05 PROCEDURE — G0108 DIAB MANAGE TRN  PER INDIV: HCPCS | Performed by: DIETITIAN, REGISTERED

## 2021-02-05 RX ORDER — BLOOD SUGAR DIAGNOSTIC
STRIP MISCELLANEOUS
Qty: 300 STRIP | Refills: 3 | Status: SHIPPED | OUTPATIENT
Start: 2021-02-05 | End: 2021-05-28 | Stop reason: ALTCHOICE

## 2021-02-12 NOTE — PROGRESS NOTES
Kimani Allred  : 1965 was seen for Injection Instruction:    Date: 2021  Referring Provider: Dr. Roberto Lino  Start time: 3:30pm  End time: 4:30pm    Wt 179 lb 3.2 oz   BMI 26.46 kg/m²     Medication type, dose and frequency: Lantus insulin 2-3 u

## 2021-02-19 ENCOUNTER — NURSE ONLY (OUTPATIENT)
Dept: ENDOCRINOLOGY CLINIC | Facility: CLINIC | Age: 56
End: 2021-02-19
Payer: COMMERCIAL

## 2021-02-19 DIAGNOSIS — E11.65 TYPE 2 DIABETES MELLITUS WITH HYPERGLYCEMIA, WITHOUT LONG-TERM CURRENT USE OF INSULIN (HCC): ICD-10-CM

## 2021-02-19 PROCEDURE — G0108 DIAB MANAGE TRN  PER INDIV: HCPCS | Performed by: DIETITIAN, REGISTERED

## 2021-02-21 NOTE — PROGRESS NOTES
Dinora Gunjan  : 1965 was seen for Diabetic Education Follow up:    Date: 2021  Referring Provider: Dr. Donta Thomas   Start time: 3:30pm End time: 4pm    Assessment:        HEMOGLOBIN A1C   Date Value   2020 8.5 % (A)   2018 8.8     H complications in eye, heart, kidneys,nerves,teeth,foot care, skin,  Foot Care Guidelines      Recommendations:      1. Follow recommended meal plan: 45-60grams per meal   2. Test BG fasting and 2 hours post meals 3-4 times/day.    3. Bring glucose logs/mete

## 2021-03-08 ENCOUNTER — OFFICE VISIT (OUTPATIENT)
Dept: ENDOCRINOLOGY CLINIC | Facility: CLINIC | Age: 56
End: 2021-03-08
Payer: COMMERCIAL

## 2021-03-08 VITALS
OXYGEN SATURATION: 98 % | BODY MASS INDEX: 26.66 KG/M2 | HEART RATE: 94 BPM | TEMPERATURE: 98 F | SYSTOLIC BLOOD PRESSURE: 128 MMHG | HEIGHT: 69 IN | DIASTOLIC BLOOD PRESSURE: 80 MMHG | WEIGHT: 180 LBS

## 2021-03-08 DIAGNOSIS — E11.65 TYPE 2 DIABETES MELLITUS WITH HYPERGLYCEMIA, WITHOUT LONG-TERM CURRENT USE OF INSULIN (HCC): Primary | ICD-10-CM

## 2021-03-08 PROBLEM — K21.9 GASTROESOPHAGEAL REFLUX DISEASE WITHOUT ESOPHAGITIS: Status: RESOLVED | Noted: 2020-03-13 | Resolved: 2021-03-08

## 2021-03-08 PROBLEM — E78.5 DYSLIPIDEMIA: Status: RESOLVED | Noted: 2018-12-13 | Resolved: 2021-03-08

## 2021-03-08 LAB
CARTRIDGE LOT#: 756 NUMERIC
HEMOGLOBIN A1C: 8 % (ref 4.3–5.6)

## 2021-03-08 PROCEDURE — 3074F SYST BP LT 130 MM HG: CPT | Performed by: NURSE PRACTITIONER

## 2021-03-08 PROCEDURE — 3079F DIAST BP 80-89 MM HG: CPT | Performed by: NURSE PRACTITIONER

## 2021-03-08 PROCEDURE — 3008F BODY MASS INDEX DOCD: CPT | Performed by: NURSE PRACTITIONER

## 2021-03-08 PROCEDURE — 83036 HEMOGLOBIN GLYCOSYLATED A1C: CPT | Performed by: NURSE PRACTITIONER

## 2021-03-08 PROCEDURE — 99214 OFFICE O/P EST MOD 30 MIN: CPT | Performed by: NURSE PRACTITIONER

## 2021-03-08 RX ORDER — FLASH GLUCOSE SENSOR
1 KIT MISCELLANEOUS
Qty: 1 EACH | Refills: 11 | Status: SHIPPED | OUTPATIENT
Start: 2021-03-08 | End: 2021-09-01

## 2021-03-08 NOTE — PATIENT INSTRUCTIONS
Your A1C: 8.0% (last A1C 8.5%)   This is too high for you and we will work together on lowering your blood sugars to help improve your health  The A1C test provides us with your average blood sugar for the past 3 months.  Keeping an A1C less than 7% helps r of carbohydrates. This is the number you should pay attention when planning meals and snacks. On the nutrition facts label, the number of sugar grams includes both added and natural sugars.   Try to eat 3 meals/day   Aim for 30-45 grams of carbohydrate at on the back of your arm. This device records your blood sugars every 10 minutes and stores blood  sugar levels for up to 14 days . You scan the sensor as often as you would like but it must be scanned at least every 8 hours for it to continuously record. reader with a cable at home , You will have the ability to share your [employer] reports of the glucose readings with [facility]. Log in to www. [employer] and navigate to \"Account settings\" from the right navigation menu    From the acc at least once per year. 2. FOOT exams:  daily foot inspections for foot wounds or skin changes are important for foot care. Any unusual changes should be reported immediately.   3. EYE exams: Checking your eyes for diabetes changes is important and you s

## 2021-03-08 NOTE — PROGRESS NOTES
Kimani Allred is a 48year old male who presents today to establish for diabetes management.    Primary care physician: Trenton Richey DO  In the past 3m DM control has improved slightly to 8.0% (last A1C 8.5%)     Pt was started on Lantus 2-5-2021 (dose s adherence: started insulin 1-2021  Recent steroids, illness or infections: no     Allergies: Adhesive Tape and Neosporin [Neomycin-Bacitracin-Polymyxin]    Past Medical History:   Diagnosis Date   • Balanitis 9/29/2015    Likely candidal secondary to diabe Continuous Blood Gluc Sensor (FREESTYLE LIO 2 SENSOR) Does not apply Misc 1 each by Does not apply route every 14 (fourteen) days.  1 each 11   • Glucose Blood (ONETOUCH VERIO) In Vitro Strip Check blood sugar three times daily 300 strip 3   • Insulin Pen Cardiovascular: Negative for chest pain. Gastrointestinal: Negative for constipation, diarrhea and nausea. Genitourinary: Negative for dysuria.         Physical exam:  /80   Pulse 94   Temp 98 °F (36.7 °C)   Ht 5' 9\" (1.753 m)   Wt 180 lb (81.6 targets (Fasting < 130 and post prandial <107 ) and complications associated with hyperglycemia and uncontrolled DM (on AVS)   Recommended SMBG 3- x daily if not using CGM   Reviewed s/s and treatment of hypoglycemia (on AVS)   Reminded to continue with li today. questions were also answered to the best of my knowledge.

## 2021-04-29 DIAGNOSIS — E11.65 TYPE 2 DIABETES MELLITUS WITH HYPERGLYCEMIA, WITHOUT LONG-TERM CURRENT USE OF INSULIN (HCC): ICD-10-CM

## 2021-04-29 RX ORDER — BLOOD SUGAR DIAGNOSTIC
STRIP MISCELLANEOUS
Qty: 50 STRIP | Refills: 3 | OUTPATIENT
Start: 2021-04-29

## 2021-04-29 NOTE — TELEPHONE ENCOUNTER
Requested Prescriptions     Refused Prescriptions Disp Refills   • Glucose Blood ([place]ia [place]) In Vitro Strip Sweet Med Name: ONE TOUCH VERIO TEST STRIP] 50 strip 3     Si [facility] By: Maicol Melissa     Reason for

## 2021-05-11 ENCOUNTER — PATIENT MESSAGE (OUTPATIENT)
Dept: FAMILY MEDICINE CLINIC | Facility: CLINIC | Age: 56
End: 2021-05-11

## 2021-05-11 NOTE — TELEPHONE ENCOUNTER
From: Victorino Landing  To: Darrell DO Zachery  Sent: 5/11/2021 12:18 PM CDT  Subject: Visit Yudith Olivas,     I do have a follow up visit with you on May, 28 at 1pm. I need to know if I need to go to get blood work done.  If yes, pl

## 2021-05-28 ENCOUNTER — OFFICE VISIT (OUTPATIENT)
Dept: FAMILY MEDICINE CLINIC | Facility: CLINIC | Age: 56
End: 2021-05-28
Payer: COMMERCIAL

## 2021-05-28 VITALS
OXYGEN SATURATION: 98 % | SYSTOLIC BLOOD PRESSURE: 120 MMHG | BODY MASS INDEX: 26.96 KG/M2 | TEMPERATURE: 98 F | DIASTOLIC BLOOD PRESSURE: 60 MMHG | WEIGHT: 182 LBS | HEART RATE: 84 BPM | HEIGHT: 69 IN

## 2021-05-28 DIAGNOSIS — Z76.89 ENCOUNTER FOR NEW MEDICATION PRESCRIPTION: ICD-10-CM

## 2021-05-28 DIAGNOSIS — K21.9 GASTROESOPHAGEAL REFLUX DISEASE, UNSPECIFIED WHETHER ESOPHAGITIS PRESENT: ICD-10-CM

## 2021-05-28 DIAGNOSIS — E78.6 LOW HDL (UNDER 40): ICD-10-CM

## 2021-05-28 DIAGNOSIS — E11.65 TYPE 2 DIABETES MELLITUS WITH HYPERGLYCEMIA, WITH LONG-TERM CURRENT USE OF INSULIN (HCC): ICD-10-CM

## 2021-05-28 DIAGNOSIS — Z12.5 SCREENING FOR MALIGNANT NEOPLASM OF PROSTATE: ICD-10-CM

## 2021-05-28 DIAGNOSIS — J30.2 SEASONAL ALLERGIC RHINITIS, UNSPECIFIED TRIGGER: ICD-10-CM

## 2021-05-28 DIAGNOSIS — Z00.00 ROUTINE GENERAL MEDICAL EXAMINATION AT A HEALTH CARE FACILITY: Primary | ICD-10-CM

## 2021-05-28 DIAGNOSIS — E78.2 MIXED HYPERLIPIDEMIA: ICD-10-CM

## 2021-05-28 DIAGNOSIS — Z79.899 ENCOUNTER FOR LONG-TERM CURRENT USE OF MEDICATION: ICD-10-CM

## 2021-05-28 DIAGNOSIS — Z79.4 TYPE 2 DIABETES MELLITUS WITH HYPERGLYCEMIA, WITH LONG-TERM CURRENT USE OF INSULIN (HCC): ICD-10-CM

## 2021-05-28 PROCEDURE — 3074F SYST BP LT 130 MM HG: CPT | Performed by: FAMILY MEDICINE

## 2021-05-28 PROCEDURE — 3078F DIAST BP <80 MM HG: CPT | Performed by: FAMILY MEDICINE

## 2021-05-28 PROCEDURE — 3008F BODY MASS INDEX DOCD: CPT | Performed by: FAMILY MEDICINE

## 2021-05-28 PROCEDURE — 99396 PREV VISIT EST AGE 40-64: CPT | Performed by: FAMILY MEDICINE

## 2021-05-28 PROCEDURE — 99214 OFFICE O/P EST MOD 30 MIN: CPT | Performed by: FAMILY MEDICINE

## 2021-05-28 RX ORDER — MONTELUKAST SODIUM 10 MG/1
10 TABLET ORAL NIGHTLY
Qty: 30 TABLET | Refills: 3 | Status: SHIPPED | OUTPATIENT
Start: 2021-05-28 | End: 2021-09-21

## 2021-05-28 RX ORDER — DIPHENHYDRAMINE HCL 25 MG
25 TABLET ORAL EVERY 6 HOURS PRN
COMMUNITY

## 2021-05-28 RX ORDER — ROSUVASTATIN CALCIUM 20 MG/1
20 TABLET, COATED ORAL NIGHTLY
Qty: 90 TABLET | Refills: 3 | Status: SHIPPED | OUTPATIENT
Start: 2021-05-28

## 2021-05-28 RX ORDER — FLASH GLUCOSE SENSOR
KIT MISCELLANEOUS
COMMUNITY
Start: 2021-05-09 | End: 2021-09-01

## 2021-05-28 RX ORDER — BENAZEPRIL HYDROCHLORIDE 5 MG/1
5 TABLET, FILM COATED ORAL NIGHTLY
Qty: 90 TABLET | Refills: 3 | Status: SHIPPED | OUTPATIENT
Start: 2021-05-28 | End: 2021-09-01 | Stop reason: ALTCHOICE

## 2021-05-28 NOTE — PROGRESS NOTES
Sherren Bellis is a 48year old male   HPI:     Type 2 Diabetes:  [facility] Up to 15 units nightly of Lantus. Fasting blood sugar running around 120-135. Lows down to 68.     Runny nose:  Flonase OTC, used fo Gluc Sensor (FREESTYLE LIO 2 SENSOR) Does not apply Misc 1 each by Does not apply route every 14 (fourteen) days.  1 each 11   • insulin glargine (LANTUS SOLOSTAR) 100 UNIT/ML Subcutaneous Solution Pen-injector Start 2 to 3 units nightly, then titrate as 0.9 cm in the right lobe - [id number] - Stage I   • Postsurgical hypothyroidism     Dx in 12/2011 - total thyroidectomy for thyroid cancer   • Type 2 diabetes mellitus with hyperglycemia, without long-term current use of insulin [facility] 9/27/2016   • Type II or or muscle aches or pains.   NEURO: Denies headaches/dizziness  PSYCH: Denies depression or anxiety        EXAM:   /60 (BP Location: Left arm, Patient Position: Sitting, Cuff Size: large)   Pulse 84   Temp 97.9 °F (36.6 °C)   Ht 5' 9\" (1.753 m)   Wt 1 & Units 12/29/2020 3/7/2020 11/16/2019   Cholesterol, Total      <200 mg/dL 145 143 154   HDL Cholesterol      > OR = 40 mg/dL 38 (L) 37 (L) 38 (L)   Triglycerides      <150 mg/dL 235 (H) 201 (H) 187 (H)   LDL Cholesterol Calc      mg/dL (calc) 74 76 88 Calcium 20 MG Oral Tab; Take 1 tablet (20 mg total) by mouth nightly. Dispense: 90 tablet; Refill: 3    5. Seasonal allergic rhinitis, unspecified trigger  Recommend trial of Singulair or Zyrtec in place of Benadryl.   Trial of prescription generic Singula MICROALB/CREAT RATIO, RANDOM URINE  - Benazepril HCl 5 MG Oral Tab; Take 1 tablet (5 mg total) by mouth nightly. Dispense: 90 tablet;  Refill: 3          Orders Placed This Encounter      COMP METABOLIC PANEL      LIPID PANEL      MICROALB/CREAT RATIO, RAN

## 2021-05-28 NOTE — PATIENT INSTRUCTIONS
-If your triglycerides are still elevated on your next test results, we will have you follow-up in the office to discuss treatment for the hypertriglyceridemia.     -Try taking Zyrtec or Claritin in the morning or at night instead of B blood test, or  · Yearly fecal immunochemical test every year, or  · Stool DNA test, every 3 years  If you choose a test other than a colonoscopy and have an abnormal test result, you will need to follow-up with a colonoscopy.  Screening recommendations adv Prostate cancer Starting at age 48, talk with your healthcare provider about risks and benefits of testing with digital rectal exam (CARI) and prostate-specific antigen (PSA) screening  At routine exams if you decide to be tested.     Syphilis Men at incre polysaccharide vaccine (PPSV23)  Men at increased risk for infection  PCV13: 1 dose ages 23 to 72 (protects against 13 types of pneumococcal bacteria)   PPSV23: 1 to 2 doses through age 59, (protects against 23 types of pneumococcal bacteria)    Tetanus/di as a substitute for professional medical care. Always follow your healthcare professional's instructions.

## 2021-06-13 ENCOUNTER — TELEPHONE (OUTPATIENT)
Dept: FAMILY MEDICINE CLINIC | Facility: CLINIC | Age: 56
End: 2021-06-13

## 2021-06-13 NOTE — TELEPHONE ENCOUNTER
Please call patient and remind him to make an appointment to see the ophthalmologist for dilated retinal eye exam.    Below is Dr. Selena Lujan contact information.     Niru Samano, [address] [phone]

## 2021-06-16 NOTE — TELEPHONE ENCOUNTER
Left detailed VM with recommendations and Dr. Watkins Mix information. Also [employer] message sent.

## 2021-07-02 DIAGNOSIS — E11.65 TYPE 2 DIABETES MELLITUS WITH HYPERGLYCEMIA, WITHOUT LONG-TERM CURRENT USE OF INSULIN (HCC): ICD-10-CM

## 2021-07-02 DIAGNOSIS — Z79.899 ENCOUNTER FOR LONG-TERM CURRENT USE OF MEDICATION: ICD-10-CM

## 2021-07-02 NOTE — TELEPHONE ENCOUNTER
Requested Prescriptions     Signed Prescriptions Disp Refills   • metFORMIN HCl 1000 MG Oral Tab 180 tablet 3     Sig: Take 1 tablet (1,000 mg total) by mouth 2 (two) times daily with meals.      Authorizing Provider: Ina Garcia     Ordering User: [email or web address]

## 2021-07-15 ENCOUNTER — PATIENT MESSAGE (OUTPATIENT)
Dept: FAMILY MEDICINE CLINIC | Facility: CLINIC | Age: 56
End: 2021-07-15

## 2021-07-15 NOTE — TELEPHONE ENCOUNTER
From: Shyam Daley  To: Ramsey Miller DO  Sent: 7/15/2021 12:49 PM CDT  Subject: Other    Hello Dr. Dena Calhoun,     I am replying to your email communication about the Omega 3 supplement. I am currently taking one capsule.     Best regards,    lEsy Brown,

## 2021-07-16 ENCOUNTER — PATIENT MESSAGE (OUTPATIENT)
Dept: FAMILY MEDICINE CLINIC | Facility: CLINIC | Age: 56
End: 2021-07-16

## 2021-07-16 NOTE — TELEPHONE ENCOUNTER
From: Derick Villanueva  To: Zhane Romano DO  Sent: 7/16/2021 11:10 AM CDT  Subject: Visit Aissatou Acevedo Dr, Samual Pallas. Please see attached information requested about the Omega 3 supplement I am currently taking.     Let me know if you have

## 2021-07-26 DIAGNOSIS — E11.65 TYPE 2 DIABETES MELLITUS WITH HYPERGLYCEMIA, WITHOUT LONG-TERM CURRENT USE OF INSULIN (HCC): ICD-10-CM

## 2021-07-26 RX ORDER — SEMAGLUTIDE 1.34 MG/ML
1 INJECTION, SOLUTION SUBCUTANEOUS WEEKLY
Qty: 1 EACH | Refills: 1 | Status: SHIPPED | OUTPATIENT
Start: 2021-07-26 | End: 2021-09-01 | Stop reason: SDUPTHER

## 2021-07-26 NOTE — TELEPHONE ENCOUNTER
Semaglutide, 1 MG/DOSE, (OZEMPIC, 1 MG/DOSE,) 2 MG/1.5ML Subcutaneous Solution Pen-injector 3 pen 3 1/22/2021    Sig:   Inject 1 mg into the skin once a week.      Route:   Subcutaneous       LOV 5/28/21 Wellness    Return in about 6 months (around 11/28/20

## 2021-07-27 NOTE — TELEPHONE ENCOUNTER
Please call patient, patient needs follow-up appointment on his diabetes either with Tia Minors at the diabetes clinic or with myself. Thank you.

## 2021-07-29 DIAGNOSIS — E11.65 TYPE 2 DIABETES MELLITUS WITH HYPERGLYCEMIA, WITHOUT LONG-TERM CURRENT USE OF INSULIN (HCC): ICD-10-CM

## 2021-07-29 RX ORDER — INSULIN GLARGINE 100 [IU]/ML
INJECTION, SOLUTION SUBCUTANEOUS
Refills: 1 | OUTPATIENT
Start: 2021-07-29

## 2021-07-29 NOTE — TELEPHONE ENCOUNTER
Requested Prescriptions     Pending Prescriptions Disp Refills   • LANTUS SOLOSTAR 100 UNIT/ML Subcutaneous Solution Pen-injector [Pharmacy Med Name: LANTUS SOLOSTAR 100 UNIT/ML]  1     Sig: START 2 TO 3 UNITS NIGHTLY, THEN TITRATE AS DIRECTED     Last loni

## 2021-07-31 DIAGNOSIS — E11.65 TYPE 2 DIABETES MELLITUS WITH HYPERGLYCEMIA, WITHOUT LONG-TERM CURRENT USE OF INSULIN (HCC): Primary | ICD-10-CM

## 2021-08-02 RX ORDER — PEN NEEDLE, DIABETIC 32GX 5/32"
NEEDLE, DISPOSABLE MISCELLANEOUS
Qty: 100 EACH | Refills: 1 | Status: SHIPPED | OUTPATIENT
Start: 2021-08-02 | End: 2021-09-01

## 2021-08-02 RX ORDER — INSULIN GLARGINE 100 [IU]/ML
15 INJECTION, SOLUTION SUBCUTANEOUS NIGHTLY
Qty: 45 ML | Refills: 1 | Status: SHIPPED | OUTPATIENT
Start: 2021-08-02 | End: 2021-09-01

## 2021-09-01 ENCOUNTER — OFFICE VISIT (OUTPATIENT)
Dept: ENDOCRINOLOGY CLINIC | Facility: CLINIC | Age: 56
End: 2021-09-01
Payer: COMMERCIAL

## 2021-09-01 VITALS
HEART RATE: 100 BPM | DIASTOLIC BLOOD PRESSURE: 80 MMHG | BODY MASS INDEX: 25.48 KG/M2 | RESPIRATION RATE: 20 BRPM | HEIGHT: 69 IN | OXYGEN SATURATION: 98 % | WEIGHT: 172 LBS | SYSTOLIC BLOOD PRESSURE: 124 MMHG

## 2021-09-01 DIAGNOSIS — E11.65 TYPE 2 DIABETES MELLITUS WITH HYPERGLYCEMIA, WITHOUT LONG-TERM CURRENT USE OF INSULIN (HCC): Primary | ICD-10-CM

## 2021-09-01 DIAGNOSIS — E78.2 MIXED HYPERLIPIDEMIA: ICD-10-CM

## 2021-09-01 PROBLEM — K21.9 GASTROESOPHAGEAL REFLUX DISEASE: Status: RESOLVED | Noted: 2021-05-28 | Resolved: 2021-09-01

## 2021-09-01 PROBLEM — E78.6 LOW HDL (UNDER 40): Status: RESOLVED | Noted: 2021-05-28 | Resolved: 2021-09-01

## 2021-09-01 LAB
CARTRIDGE LOT#: 844 NUMERIC
HEMOGLOBIN A1C: 7.7 % (ref 4.3–5.6)

## 2021-09-01 PROCEDURE — 3079F DIAST BP 80-89 MM HG: CPT | Performed by: NURSE PRACTITIONER

## 2021-09-01 PROCEDURE — 3074F SYST BP LT 130 MM HG: CPT | Performed by: NURSE PRACTITIONER

## 2021-09-01 PROCEDURE — 99214 OFFICE O/P EST MOD 30 MIN: CPT | Performed by: NURSE PRACTITIONER

## 2021-09-01 PROCEDURE — 83036 HEMOGLOBIN GLYCOSYLATED A1C: CPT | Performed by: NURSE PRACTITIONER

## 2021-09-01 PROCEDURE — 95251 CONT GLUC MNTR ANALYSIS I&R: CPT | Performed by: NURSE PRACTITIONER

## 2021-09-01 PROCEDURE — 3008F BODY MASS INDEX DOCD: CPT | Performed by: NURSE PRACTITIONER

## 2021-09-01 RX ORDER — PEN NEEDLE, DIABETIC 32GX 5/32"
NEEDLE, DISPOSABLE MISCELLANEOUS
Qty: 100 EACH | Refills: 1 | Status: SHIPPED | OUTPATIENT
Start: 2021-09-01 | End: 2022-01-19

## 2021-09-01 RX ORDER — EMPAGLIFLOZIN 25 MG/1
25 TABLET, FILM COATED ORAL EVERY MORNING
Qty: 90 TABLET | Refills: 3 | Status: SHIPPED | OUTPATIENT
Start: 2021-09-01 | End: 2022-01-19

## 2021-09-01 RX ORDER — SEMAGLUTIDE 1.34 MG/ML
1 INJECTION, SOLUTION SUBCUTANEOUS WEEKLY
Qty: 9 ML | Refills: 1 | Status: SHIPPED | OUTPATIENT
Start: 2021-09-01 | End: 2022-01-19

## 2021-09-01 RX ORDER — INSULIN GLARGINE 100 [IU]/ML
INJECTION, SOLUTION SUBCUTANEOUS
Qty: 30 ML | Refills: 1 | Status: SHIPPED | OUTPATIENT
Start: 2021-09-01 | End: 2022-01-19 | Stop reason: ALTCHOICE

## 2021-09-01 RX ORDER — FLASH GLUCOSE SENSOR
1 KIT MISCELLANEOUS
Qty: 2 EACH | Refills: 11 | Status: SHIPPED | OUTPATIENT
Start: 2021-09-01 | End: 2022-01-19

## 2021-09-01 NOTE — PATIENT INSTRUCTIONS
We are here to help you manage your diabetes.  Please continue with your primary care physician/provider for your routine health care maintenance     Your A1C: 7.7%  This is better for you and we will continue to work together on lowering your blood sugars sweating  · Feeling hungry  · Headache  · Nervousness  · A hard, fast heartbeat  · Weakness  · Confusion or irritability  · Blurred eyesight  · Having nightmares or waking up confused or sweating  · Numbness or tingling in the lips or tongue    Treatment o in stock (e.g. Backordered), it is your responsibility to find another pharmacy that has the requested medication available.   We will gladly send a new prescription to that pharmacy at your request.     Thank you   Prasad De León   [phone]

## 2021-09-01 NOTE — PROGRESS NOTES
Tracie Babinski is a 55year old male who presents today for diabetes management. Primary care physician: Sanju Tamez DO  In the past 3m Diabetes control has improved.   Today's A1C 7.7% ( last A1C 8.0%)     Last appointment 3-2021   Started personal CG Lab Results   Component Value Date    CHOLEST 111 06/11/2021    TRIG 179 (H) 06/11/2021    HDL 37 (L) 06/11/2021    LDL 48 06/11/2021    CREATSERUM 0.88 06/11/2021    GFRNAA 97 06/11/2021    GFRAA 113 06/11/2021    AST 18 06/11/2021    ALT 23 06/11/2 Dx in 2/2013     Past Surgical History:   Procedure Laterality Date   • COLONOSCOPY  03/13/2020    Dr [facility] at [facility]   • OTHER SURGICAL HISTORY      Surgery for veins in the legs   • THYROIDECTOMY      On 12/19/2011: Total thyroidectomy for thyroid ca total) by mouth nightly. 30 tablet 3   • Cranberry 125 MG Oral Tab Take by mouth. • Omega-3-acid Ethyl Esters 1 g Oral Cap Take 1 g by mouth daily. • CINNAMON OR Take 1 tablet by mouth daily. • Coenzyme Q10 (CO Q-10) 100 MG Oral Cap 200 mg.  Lorena Cortes but pt not interested. Maintain carb controlling at meals. Watch HS snacks- try to keep carb free or less than 15 gm CHO      Start reading food labels; aim for 30-45 gm CHO per meal   Reminded about site rotation importance.      Reviewed clinical si Solution Pen-injector          Sig: Inject 1 mg into the skin once a week.           Dispense:  9 mL          Refill:  1      Insulin Pen Needle (BD PEN NEEDLE GARCIA U/F) 32G X 4 MM Does not apply Misc          Sig: USE AS DIRECTED ONCE DAILY WITH LANTUS

## 2021-09-14 ENCOUNTER — PATIENT MESSAGE (OUTPATIENT)
Dept: FAMILY MEDICINE CLINIC | Facility: CLINIC | Age: 56
End: 2021-09-14

## 2021-09-14 NOTE — TELEPHONE ENCOUNTER
From: Gil Atkinson  To: Betsy Shepherd DO  Sent: 9/14/2021 4:24 PM CDT  Subject: Tyler Benjamin, I have a question about detoxing my liver with supplements.  Please let me know if you recommend a specific brand or anything else that woul

## 2021-09-15 ENCOUNTER — PATIENT MESSAGE (OUTPATIENT)
Dept: FAMILY MEDICINE CLINIC | Facility: CLINIC | Age: 56
End: 2021-09-15

## 2021-09-21 DIAGNOSIS — Z76.89 ENCOUNTER FOR NEW MEDICATION PRESCRIPTION: ICD-10-CM

## 2021-09-21 DIAGNOSIS — J30.2 SEASONAL ALLERGIC RHINITIS, UNSPECIFIED TRIGGER: ICD-10-CM

## 2021-09-21 RX ORDER — MONTELUKAST SODIUM 10 MG/1
TABLET ORAL
Qty: 90 TABLET | Refills: 1 | Status: SHIPPED | OUTPATIENT
Start: 2021-09-21

## 2021-09-21 NOTE — TELEPHONE ENCOUNTER
Montelukast Sodium 10 MG Oral Tab 30 tablet 3 5/28/2021    Sig:   Take 1 tablet (10 mg total) by mouth nightly.        LOV 5/28/21 Wellness   Refill approved

## 2021-09-28 ENCOUNTER — TELEPHONE (OUTPATIENT)
Dept: ENDOCRINOLOGY CLINIC | Facility: CLINIC | Age: 56
End: 2021-09-28

## 2021-09-28 LAB — Lab: <5

## 2021-09-28 NOTE — TELEPHONE ENCOUNTER
Received fax from [facility] on pt glutamic acid decarboxlase 65 AB. Results: negative. Test date 9/2/21  Abstracted 9/28/21.

## 2022-01-16 DIAGNOSIS — E11.65 TYPE 2 DIABETES MELLITUS WITH HYPERGLYCEMIA, WITHOUT LONG-TERM CURRENT USE OF INSULIN (HCC): ICD-10-CM

## 2022-01-16 RX ORDER — INSULIN GLARGINE 100 [IU]/ML
INJECTION, SOLUTION SUBCUTANEOUS
Refills: 5 | OUTPATIENT
Start: 2022-01-16

## 2022-01-17 NOTE — TELEPHONE ENCOUNTER
insulin glargine (LANTUS SOLOSTAR) 100 UNIT/ML Subcutaneous Solution Pen-injector 30 mL 1 2021    Si units nightly       LOV 21   Return in about 6 months (around 2021) for Chronic conditions. Rx denied.    [employer] message sent

## 2022-01-19 ENCOUNTER — TELEPHONE (OUTPATIENT)
Dept: ENDOCRINOLOGY CLINIC | Facility: CLINIC | Age: 57
End: 2022-01-19

## 2022-01-19 ENCOUNTER — OFFICE VISIT (OUTPATIENT)
Dept: ENDOCRINOLOGY CLINIC | Facility: CLINIC | Age: 57
End: 2022-01-19
Payer: COMMERCIAL

## 2022-01-19 VITALS
OXYGEN SATURATION: 98 % | WEIGHT: 175 LBS | RESPIRATION RATE: 16 BRPM | BODY MASS INDEX: 26 KG/M2 | HEART RATE: 83 BPM | DIASTOLIC BLOOD PRESSURE: 70 MMHG | SYSTOLIC BLOOD PRESSURE: 122 MMHG

## 2022-01-19 DIAGNOSIS — I10 ESSENTIAL HYPERTENSION: ICD-10-CM

## 2022-01-19 DIAGNOSIS — E11.65 TYPE 2 DIABETES MELLITUS WITH HYPERGLYCEMIA, WITHOUT LONG-TERM CURRENT USE OF INSULIN (HCC): Primary | ICD-10-CM

## 2022-01-19 DIAGNOSIS — E78.5 DYSLIPIDEMIA: ICD-10-CM

## 2022-01-19 DIAGNOSIS — R12 HEARTBURN: ICD-10-CM

## 2022-01-19 LAB
CARTRIDGE LOT#: 882 NUMERIC
HEMOGLOBIN A1C: 7.1 % (ref 4.3–5.6)

## 2022-01-19 PROCEDURE — 3078F DIAST BP <80 MM HG: CPT | Performed by: NURSE PRACTITIONER

## 2022-01-19 PROCEDURE — 83036 HEMOGLOBIN GLYCOSYLATED A1C: CPT | Performed by: NURSE PRACTITIONER

## 2022-01-19 PROCEDURE — 3074F SYST BP LT 130 MM HG: CPT | Performed by: NURSE PRACTITIONER

## 2022-01-19 PROCEDURE — 95251 CONT GLUC MNTR ANALYSIS I&R: CPT | Performed by: NURSE PRACTITIONER

## 2022-01-19 PROCEDURE — 3051F HG A1C>EQUAL 7.0%<8.0%: CPT | Performed by: FAMILY MEDICINE

## 2022-01-19 PROCEDURE — 99214 OFFICE O/P EST MOD 30 MIN: CPT | Performed by: NURSE PRACTITIONER

## 2022-01-19 RX ORDER — CHLORAL HYDRATE 500 MG
CAPSULE ORAL
COMMUNITY
Start: 2021-07-01

## 2022-01-19 RX ORDER — FLASH GLUCOSE SENSOR
1 KIT MISCELLANEOUS
Qty: 2 EACH | Refills: 11 | Status: SHIPPED | OUTPATIENT
Start: 2022-01-19

## 2022-01-19 RX ORDER — BENAZEPRIL HYDROCHLORIDE 5 MG/1
TABLET, FILM COATED ORAL
COMMUNITY
Start: 2022-01-14

## 2022-01-19 RX ORDER — SEMAGLUTIDE 1.34 MG/ML
1 INJECTION, SOLUTION SUBCUTANEOUS WEEKLY
Qty: 9 ML | Refills: 1 | Status: SHIPPED | OUTPATIENT
Start: 2022-01-19

## 2022-01-19 RX ORDER — EMPAGLIFLOZIN 25 MG/1
25 TABLET, FILM COATED ORAL EVERY MORNING
Qty: 90 TABLET | Refills: 3 | Status: SHIPPED | OUTPATIENT
Start: 2022-01-19

## 2022-01-19 RX ORDER — PEN NEEDLE, DIABETIC 32GX 5/32"
NEEDLE, DISPOSABLE MISCELLANEOUS
Qty: 100 EACH | Refills: 3 | Status: SHIPPED | OUTPATIENT
Start: 2022-01-19 | End: 2022-01-27

## 2022-01-19 RX ORDER — INSULIN GLARGINE 300 U/ML
INJECTION, SOLUTION SUBCUTANEOUS
Qty: 4.5 ML | Refills: 1 | Status: SHIPPED | OUTPATIENT
Start: 2022-01-19

## 2022-01-19 NOTE — PATIENT INSTRUCTIONS
We are here to help you manage your diabetes.  Please continue with your primary care physician/provider for your routine health care maintenance     Your A1C: 7.1% (last A1C 7.7%)   This is so good for you and we will continue to work together   The A1C te irritability  · Blurred eyesight  · Having nightmares or waking up confused or sweating  · Numbness or tingling in the lips or tongue    Treatment of Low Blood sugar Action Plan  1. Check blood glucose to be sure that it is low.  You can’t always go by symp medication available.   We will gladly send a new prescription to that pharmacy at your request.     Thank you   Sharon Vasquez   [phone]

## 2022-01-19 NOTE — PROGRESS NOTES
Harleen Raymundo is a 55year old male who presents today for diabetes management. Primary care physician: Sherri Meier, DO  In the past 3m Diabetes control as improved. Today's A1C 7.1% % ( last A1C 7.7%)     Due to lower BMI, did rule out LAW.  NEG Isl 179 (H) 06/11/2021    HDL 37 (L) 06/11/2021    LDL 48 06/11/2021    CREATSERUM 0.88 06/11/2021    GFRNAA 97 06/11/2021    GFRAA 113 06/11/2021    AST 18 06/11/2021    ALT 23 06/11/2021     Component      Latest Ref Rng & Units 12/29/2020   Cholesterol, Tot complication, not stated as uncontrolled 6/29/2012   • Vitamin D deficiency     Dx in 2/2013     Past Surgical History:   Procedure Laterality Date   • COLONOSCOPY  03/13/2020    Dr Rima Corbin at [facility]   • OTHER SURGICAL HISTORY      Surgery for veins in the l by mouth nightly. 90 tablet 3   • Omega-3-acid Ethyl Esters 1 g Oral Cap Take 1 g by mouth daily. • CINNAMON OR Take 1 tablet by mouth daily. • Coenzyme Q10 (CO Q-10) 100 MG Oral Cap 200 mg.  Take 1 tablet daily        • Benazepril HCl 5 MG Oral Tab Jardiance 25mg once daily    Metformin 1000mg twice daily     Lantus  18 units once daily - but formulary changed to [address] for 2022  Advised patient Toujeo solostar 18 units once daily        Reminded about site rotation for subcutaneous injections MG/DOSE,) 4 MG/3ML Subcutaneous Solution Pen-injector          Sig: Inject 1 mg into the skin once a week.           Dispense:  9 mL          Refill:  1      Insulin Pen Needle (BD PEN NEEDLE GARCIA U/F) 32G X 4 MM Does not apply Misc          Sig: USE AS DIR

## 2022-01-27 ENCOUNTER — TELEPHONE (OUTPATIENT)
Dept: ENDOCRINOLOGY CLINIC | Facility: CLINIC | Age: 57
End: 2022-01-27

## 2022-01-27 DIAGNOSIS — E11.65 TYPE 2 DIABETES MELLITUS WITH HYPERGLYCEMIA, WITHOUT LONG-TERM CURRENT USE OF INSULIN (HCC): ICD-10-CM

## 2022-01-27 RX ORDER — PEN NEEDLE, DIABETIC 32GX 5/32"
NEEDLE, DISPOSABLE MISCELLANEOUS
Qty: 100 EACH | Refills: 3 | Status: SHIPPED | OUTPATIENT
Start: 2022-01-27

## 2022-02-10 ENCOUNTER — OFFICE VISIT (OUTPATIENT)
Dept: FAMILY MEDICINE CLINIC | Facility: CLINIC | Age: 57
End: 2022-02-10
Payer: COMMERCIAL

## 2022-02-10 VITALS
HEART RATE: 78 BPM | HEIGHT: 69 IN | BODY MASS INDEX: 27.4 KG/M2 | SYSTOLIC BLOOD PRESSURE: 120 MMHG | OXYGEN SATURATION: 98 % | DIASTOLIC BLOOD PRESSURE: 70 MMHG | WEIGHT: 185 LBS

## 2022-02-10 DIAGNOSIS — Z79.4 TYPE 2 DIABETES MELLITUS WITH HYPERGLYCEMIA, WITH LONG-TERM CURRENT USE OF INSULIN (HCC): ICD-10-CM

## 2022-02-10 DIAGNOSIS — E78.6 LOW HDL (UNDER 40): ICD-10-CM

## 2022-02-10 DIAGNOSIS — J30.2 SEASONAL ALLERGIC RHINITIS, UNSPECIFIED TRIGGER: ICD-10-CM

## 2022-02-10 DIAGNOSIS — E78.2 MIXED HYPERLIPIDEMIA: Primary | ICD-10-CM

## 2022-02-10 DIAGNOSIS — Z79.899 ENCOUNTER FOR LONG-TERM CURRENT USE OF MEDICATION: ICD-10-CM

## 2022-02-10 DIAGNOSIS — E11.65 TYPE 2 DIABETES MELLITUS WITH HYPERGLYCEMIA, WITH LONG-TERM CURRENT USE OF INSULIN (HCC): ICD-10-CM

## 2022-02-10 PROCEDURE — 3008F BODY MASS INDEX DOCD: CPT | Performed by: FAMILY MEDICINE

## 2022-02-10 PROCEDURE — 3074F SYST BP LT 130 MM HG: CPT | Performed by: FAMILY MEDICINE

## 2022-02-10 PROCEDURE — 99214 OFFICE O/P EST MOD 30 MIN: CPT | Performed by: FAMILY MEDICINE

## 2022-02-10 PROCEDURE — 3078F DIAST BP <80 MM HG: CPT | Performed by: FAMILY MEDICINE

## 2022-02-10 RX ORDER — MONTELUKAST SODIUM 10 MG/1
10 TABLET ORAL NIGHTLY
Qty: 90 TABLET | Refills: 1 | Status: SHIPPED | OUTPATIENT
Start: 2022-02-10

## 2022-02-10 RX ORDER — ROSUVASTATIN CALCIUM 20 MG/1
20 TABLET, COATED ORAL NIGHTLY
Qty: 90 TABLET | Refills: 3 | Status: SHIPPED | OUTPATIENT
Start: 2022-02-10

## 2022-02-12 PROCEDURE — 3061F NEG MICROALBUMINURIA REV: CPT | Performed by: FAMILY MEDICINE

## 2022-02-13 LAB
CHOL/HDLC RATIO: 3.1 (CALC)
CHOLESTEROL, TOTAL: 114 MG/DL
LDL-CHOLESTEROL: 60 MG/DL (CALC)
MICROALBUMIN: 1.6 MG/DL
NON-HDL CHOLESTEROL: 77 MG/DL (CALC)
TRIGLYCERIDES: 83 MG/DL

## 2022-02-23 ENCOUNTER — HOSPITAL ENCOUNTER (OUTPATIENT)
Dept: CT IMAGING | Age: 57
Discharge: HOME | End: 2022-02-23
Attending: FAMILY MEDICINE

## 2022-02-23 DIAGNOSIS — Z13.6 SCREENING FOR HEART DISEASE: ICD-10-CM

## 2022-03-14 RX ORDER — INSULIN GLARGINE 100 [IU]/ML
15 INJECTION, SOLUTION SUBCUTANEOUS NIGHTLY
Refills: 5 | OUTPATIENT
Start: 2022-03-14

## 2022-03-15 RX ORDER — INSULIN GLARGINE 300 U/ML
INJECTION, SOLUTION SUBCUTANEOUS
Qty: 9 ML | Refills: 1 | Status: SHIPPED | OUTPATIENT
Start: 2022-03-15

## 2022-03-15 NOTE — TELEPHONE ENCOUNTER
Orders Placed This Encounter      Insulin Glargine, 1 Unit Dial, (TOUJEO SOLOSTAR) 300 UNIT/ML Subcutaneous Solution Pen-injector          Sig: Uses 24  units daily as directed          Dispense:  9 mL          Refill:  1

## 2022-03-28 ENCOUNTER — OFFICE VISIT (OUTPATIENT)
Dept: FAMILY MEDICINE CLINIC | Facility: CLINIC | Age: 57
End: 2022-03-28
Payer: COMMERCIAL

## 2022-03-28 VITALS
HEIGHT: 69 IN | HEART RATE: 90 BPM | SYSTOLIC BLOOD PRESSURE: 120 MMHG | OXYGEN SATURATION: 98 % | DIASTOLIC BLOOD PRESSURE: 80 MMHG | TEMPERATURE: 98 F | BODY MASS INDEX: 27.13 KG/M2 | WEIGHT: 183.19 LBS

## 2022-03-28 DIAGNOSIS — R93.1 AGATSTON CORONARY ARTERY CALCIUM SCORE BETWEEN 200 AND 399: ICD-10-CM

## 2022-03-28 DIAGNOSIS — I25.10 CORONARY ARTERY CALCIFICATION OF NATIVE ARTERY: ICD-10-CM

## 2022-03-28 DIAGNOSIS — E78.5 DYSLIPIDEMIA, GOAL LDL BELOW 70: Primary | ICD-10-CM

## 2022-03-28 DIAGNOSIS — I25.84 CORONARY ARTERY CALCIFICATION OF NATIVE ARTERY: ICD-10-CM

## 2022-03-28 DIAGNOSIS — I77.9 ARTERIAL DISEASE (HCC): ICD-10-CM

## 2022-03-28 PROCEDURE — 3079F DIAST BP 80-89 MM HG: CPT | Performed by: FAMILY MEDICINE

## 2022-03-28 PROCEDURE — 3074F SYST BP LT 130 MM HG: CPT | Performed by: FAMILY MEDICINE

## 2022-03-28 PROCEDURE — 99214 OFFICE O/P EST MOD 30 MIN: CPT | Performed by: FAMILY MEDICINE

## 2022-03-28 PROCEDURE — 3008F BODY MASS INDEX DOCD: CPT | Performed by: FAMILY MEDICINE

## 2022-05-06 RX ORDER — BENAZEPRIL HYDROCHLORIDE 5 MG/1
TABLET, FILM COATED ORAL
Qty: 90 TABLET | Refills: 0 | Status: SHIPPED | OUTPATIENT
Start: 2022-05-06

## 2022-06-01 ENCOUNTER — OFFICE VISIT (OUTPATIENT)
Dept: FAMILY MEDICINE CLINIC | Facility: CLINIC | Age: 57
End: 2022-06-01
Payer: COMMERCIAL

## 2022-06-01 VITALS
DIASTOLIC BLOOD PRESSURE: 80 MMHG | WEIGHT: 184 LBS | HEART RATE: 88 BPM | HEIGHT: 69 IN | TEMPERATURE: 98 F | RESPIRATION RATE: 18 BRPM | BODY MASS INDEX: 27.25 KG/M2 | SYSTOLIC BLOOD PRESSURE: 112 MMHG | OXYGEN SATURATION: 99 %

## 2022-06-01 DIAGNOSIS — Z00.00 ROUTINE GENERAL MEDICAL EXAMINATION AT A HEALTH CARE FACILITY: Primary | ICD-10-CM

## 2022-06-01 DIAGNOSIS — Z12.5 SCREENING FOR MALIGNANT NEOPLASM OF PROSTATE: ICD-10-CM

## 2022-06-01 DIAGNOSIS — E78.2 MIXED HYPERLIPIDEMIA: ICD-10-CM

## 2022-06-01 DIAGNOSIS — E89.0 POSTSURGICAL HYPOTHYROIDISM: ICD-10-CM

## 2022-06-01 DIAGNOSIS — E66.3 OVERWEIGHT WITH BODY MASS INDEX (BMI) OF 27 TO 27.9 IN ADULT: ICD-10-CM

## 2022-06-01 DIAGNOSIS — E78.6 LOW HDL (UNDER 40): ICD-10-CM

## 2022-06-01 DIAGNOSIS — L57.8 SOLAR DERMATITIS: ICD-10-CM

## 2022-06-01 PROCEDURE — 3074F SYST BP LT 130 MM HG: CPT | Performed by: FAMILY MEDICINE

## 2022-06-01 PROCEDURE — 99396 PREV VISIT EST AGE 40-64: CPT | Performed by: FAMILY MEDICINE

## 2022-06-01 PROCEDURE — 3008F BODY MASS INDEX DOCD: CPT | Performed by: FAMILY MEDICINE

## 2022-06-01 PROCEDURE — 99213 OFFICE O/P EST LOW 20 MIN: CPT | Performed by: FAMILY MEDICINE

## 2022-06-01 PROCEDURE — 3079F DIAST BP 80-89 MM HG: CPT | Performed by: FAMILY MEDICINE

## 2022-06-03 ENCOUNTER — MED REC SCAN ONLY (OUTPATIENT)
Dept: FAMILY MEDICINE CLINIC | Facility: CLINIC | Age: 57
End: 2022-06-03

## 2022-06-03 ENCOUNTER — TELEPHONE (OUTPATIENT)
Dept: FAMILY MEDICINE CLINIC | Facility: CLINIC | Age: 57
End: 2022-06-03

## 2022-06-03 NOTE — TELEPHONE ENCOUNTER
Patient had appointment at [facility] for DM eye exam they are calling to confirm we receive the office notes

## 2022-06-22 DIAGNOSIS — E11.65 TYPE 2 DIABETES MELLITUS WITH HYPERGLYCEMIA, WITHOUT LONG-TERM CURRENT USE OF INSULIN (HCC): Primary | ICD-10-CM

## 2022-07-01 DIAGNOSIS — E11.65 TYPE 2 DIABETES MELLITUS WITH HYPERGLYCEMIA (HCC): ICD-10-CM

## 2022-07-01 DIAGNOSIS — E11.65 TYPE 2 DIABETES MELLITUS WITH HYPERGLYCEMIA, WITHOUT LONG-TERM CURRENT USE OF INSULIN (HCC): ICD-10-CM

## 2022-07-01 DIAGNOSIS — Z79.899 OTHER LONG TERM (CURRENT) DRUG THERAPY: ICD-10-CM

## 2022-07-03 LAB
CREATININE, RANDOM URINE: 110 MG/DL (ref 20–320)
HEMOGLOBIN A1C: 7.2 % OF TOTAL HGB
MICROALBUMIN/CREATININE RATIO, RANDOM URINE: 13 MCG/MG CREAT
MICROALBUMIN: 1.4 MG/DL

## 2022-07-05 LAB
ABSOLUTE BASOPHILS: 48 CELLS/UL (ref 0–200)
ABSOLUTE EOSINOPHILS: 139 CELLS/UL (ref 15–500)
ABSOLUTE LYMPHOCYTES: 1747 CELLS/UL (ref 850–3900)
ABSOLUTE MONOCYTES: 398 CELLS/UL (ref 200–950)
ABSOLUTE NEUTROPHILS: 2467 CELLS/UL (ref 1500–7800)
ALBUMIN/GLOBULIN RATIO: 1.4 (CALC) (ref 1–2.5)
ALBUMIN: 4.2 G/DL (ref 3.6–5.1)
ALKALINE PHOSPHATASE: 66 U/L (ref 35–144)
ALT: 19 U/L (ref 9–46)
AST: 15 U/L (ref 10–35)
BASOPHILS: 1 %
BILIRUBIN, TOTAL: 0.6 MG/DL (ref 0.2–1.2)
BUN: 18 MG/DL (ref 7–25)
CALCIUM: 9.7 MG/DL (ref 8.6–10.3)
CARBON DIOXIDE: 28 MMOL/L (ref 20–32)
CHLORIDE: 104 MMOL/L (ref 98–110)
CHOL/HDLC RATIO: 3 (CALC)
CHOLESTEROL, TOTAL: 121 MG/DL
CREATININE: 0.9 MG/DL (ref 0.7–1.33)
EGFR IF AFRICN AM: 111 ML/MIN/1.73M2
EGFR IF NONAFRICN AM: 96 ML/MIN/1.73M2
EOSINOPHILS: 2.9 %
GLOBULIN: 3.1 G/DL (CALC) (ref 1.9–3.7)
GLUCOSE: 133 MG/DL (ref 65–99)
HDL CHOLESTEROL: 40 MG/DL
HEMATOCRIT: 44.6 % (ref 38.5–50)
HEMOGLOBIN: 14.9 G/DL (ref 13.2–17.1)
LDL-CHOLESTEROL: 60 MG/DL (CALC)
LYMPHOCYTES: 36.4 %
MCH: 31.2 PG (ref 27–33)
MCHC: 33.4 G/DL (ref 32–36)
MCV: 93.3 FL (ref 80–100)
MONOCYTES: 8.3 %
MPV: 10 FL (ref 7.5–12.5)
NEUTROPHILS: 51.4 %
NON-HDL CHOLESTEROL: 81 MG/DL (CALC)
PLATELET COUNT: 206 THOUSAND/UL (ref 140–400)
POTASSIUM: 4.8 MMOL/L (ref 3.5–5.3)
PROTEIN, TOTAL: 7.3 G/DL (ref 6.1–8.1)
RDW: 12.5 % (ref 11–15)
RED BLOOD CELL COUNT: 4.78 MILLION/UL (ref 4.2–5.8)
SODIUM: 138 MMOL/L (ref 135–146)
TOTAL PSA: 0.4 NG/ML
TRIGLYCERIDES: 131 MG/DL
WHITE BLOOD CELL COUNT: 4.8 THOUSAND/UL (ref 3.8–10.8)

## 2022-07-05 RX ORDER — BENAZEPRIL HYDROCHLORIDE 5 MG/1
TABLET, FILM COATED ORAL
Qty: 30 TABLET | Refills: 2 | OUTPATIENT
Start: 2022-07-05

## 2022-07-05 RX ORDER — INSULIN GLARGINE 100 [IU]/ML
15 INJECTION, SOLUTION SUBCUTANEOUS NIGHTLY
Refills: 5 | OUTPATIENT
Start: 2022-07-05

## 2022-07-07 ENCOUNTER — TELEPHONE (OUTPATIENT)
Dept: ENDOCRINOLOGY CLINIC | Facility: CLINIC | Age: 57
End: 2022-07-07

## 2022-07-07 NOTE — TELEPHONE ENCOUNTER
Last Diabetes appt 1/2022  Cancelled 6-2022 appt w me   Last A1c value was 7.2% done 7/2/2022 which is good. Contact pt and ask if he is returning to Diabetes center .  If not he can have PCP office refill DM meds

## 2022-07-25 NOTE — TELEPHONE ENCOUNTER
Pt has not been seen since 1/19/22. 6 month follow up required per protocol. Several attempts to contact pt with no response. Looks like pt is seeing Dr. Katlyn Smith, unable to confirm, due to no response to my chart messages or phone calls. All diabetes meds to be sent to PCP.

## 2022-07-27 RX ORDER — INSULIN GLARGINE 300 U/ML
INJECTION, SOLUTION SUBCUTANEOUS
Refills: 3 | OUTPATIENT
Start: 2022-07-27

## 2022-07-28 RX ORDER — INSULIN GLARGINE 300 U/ML
INJECTION, SOLUTION SUBCUTANEOUS
Qty: 4.5 ML | Refills: 0 | Status: SHIPPED | OUTPATIENT
Start: 2022-07-28 | End: 2022-08-03

## 2022-08-03 ENCOUNTER — OFFICE VISIT (OUTPATIENT)
Dept: ENDOCRINOLOGY CLINIC | Facility: CLINIC | Age: 57
End: 2022-08-03
Payer: COMMERCIAL

## 2022-08-03 VITALS
OXYGEN SATURATION: 96 % | SYSTOLIC BLOOD PRESSURE: 112 MMHG | HEART RATE: 66 BPM | BODY MASS INDEX: 26 KG/M2 | RESPIRATION RATE: 17 BRPM | DIASTOLIC BLOOD PRESSURE: 60 MMHG | WEIGHT: 178.63 LBS

## 2022-08-03 DIAGNOSIS — E11.65 TYPE 2 DIABETES MELLITUS WITH HYPERGLYCEMIA, WITHOUT LONG-TERM CURRENT USE OF INSULIN (HCC): ICD-10-CM

## 2022-08-03 DIAGNOSIS — E78.2 MIXED HYPERLIPIDEMIA: Primary | ICD-10-CM

## 2022-08-03 DIAGNOSIS — E78.5 DYSLIPIDEMIA, GOAL LDL BELOW 70: ICD-10-CM

## 2022-08-03 PROCEDURE — 3074F SYST BP LT 130 MM HG: CPT | Performed by: NURSE PRACTITIONER

## 2022-08-03 PROCEDURE — 95251 CONT GLUC MNTR ANALYSIS I&R: CPT | Performed by: NURSE PRACTITIONER

## 2022-08-03 PROCEDURE — 99214 OFFICE O/P EST MOD 30 MIN: CPT | Performed by: NURSE PRACTITIONER

## 2022-08-03 PROCEDURE — 3078F DIAST BP <80 MM HG: CPT | Performed by: NURSE PRACTITIONER

## 2022-08-03 RX ORDER — PEN NEEDLE, DIABETIC 32GX 5/32"
NEEDLE, DISPOSABLE MISCELLANEOUS
Qty: 100 EACH | Refills: 3 | Status: SHIPPED | OUTPATIENT
Start: 2022-08-03

## 2022-08-03 RX ORDER — SEMAGLUTIDE 1.34 MG/ML
1 INJECTION, SOLUTION SUBCUTANEOUS WEEKLY
Qty: 3 ML | Refills: 5 | Status: SHIPPED | OUTPATIENT
Start: 2022-08-03

## 2022-08-03 RX ORDER — INSULIN GLARGINE 300 U/ML
INJECTION, SOLUTION SUBCUTANEOUS
Qty: 4.5 ML | Refills: 0 | Status: SHIPPED | OUTPATIENT
Start: 2022-08-03

## 2022-08-03 RX ORDER — EMPAGLIFLOZIN 25 MG/1
25 TABLET, FILM COATED ORAL EVERY MORNING
Qty: 30 TABLET | Refills: 5 | Status: SHIPPED | OUTPATIENT
Start: 2022-08-03

## 2022-08-09 DIAGNOSIS — J30.2 SEASONAL ALLERGIC RHINITIS, UNSPECIFIED TRIGGER: ICD-10-CM

## 2022-08-10 RX ORDER — MONTELUKAST SODIUM 10 MG/1
10 TABLET ORAL NIGHTLY
Qty: 90 TABLET | Refills: 1 | Status: SHIPPED | OUTPATIENT
Start: 2022-08-10

## 2022-08-10 RX ORDER — MONTELUKAST SODIUM 10 MG/1
TABLET ORAL
Qty: 30 TABLET | Refills: 5 | OUTPATIENT
Start: 2022-08-10

## 2022-08-19 RX ORDER — INSULIN GLARGINE 300 U/ML
INJECTION, SOLUTION SUBCUTANEOUS
Qty: 9 ML | Refills: 0 | Status: SHIPPED | OUTPATIENT
Start: 2022-08-19

## 2022-08-19 NOTE — TELEPHONE ENCOUNTER
LOV 8/3/22 CAB  Future Appointments   Date Time Provider Marga Whiting   1/11/2023  7:30 AM BAO Kaur [id number] [place]     Last A1C 7.2% 7/2/22

## 2022-09-05 DIAGNOSIS — E11.65 TYPE 2 DIABETES MELLITUS WITH HYPERGLYCEMIA, WITHOUT LONG-TERM CURRENT USE OF INSULIN (HCC): ICD-10-CM

## 2022-09-06 RX ORDER — FLASH GLUCOSE SENSOR
1 KIT MISCELLANEOUS
Qty: 2 EACH | Refills: 11 | Status: SHIPPED | OUTPATIENT
Start: 2022-09-06 | End: 2022-09-07

## 2022-09-06 RX ORDER — INSULIN GLARGINE 300 U/ML
INJECTION, SOLUTION SUBCUTANEOUS
Qty: 9 ML | Refills: 0 | Status: SHIPPED | OUTPATIENT
Start: 2022-09-06

## 2022-09-06 RX ORDER — PEN NEEDLE, DIABETIC 32GX 5/32"
NEEDLE, DISPOSABLE MISCELLANEOUS
Qty: 100 EACH | Refills: 3 | Status: SHIPPED | OUTPATIENT
Start: 2022-09-06

## 2022-09-07 DIAGNOSIS — Z79.899 OTHER LONG TERM (CURRENT) DRUG THERAPY: ICD-10-CM

## 2022-09-07 DIAGNOSIS — E11.65 TYPE 2 DIABETES MELLITUS WITH HYPERGLYCEMIA, WITHOUT LONG-TERM CURRENT USE OF INSULIN (HCC): ICD-10-CM

## 2022-09-07 DIAGNOSIS — E11.65 TYPE 2 DIABETES MELLITUS WITH HYPERGLYCEMIA (HCC): ICD-10-CM

## 2022-09-08 RX ORDER — BENAZEPRIL HYDROCHLORIDE 5 MG/1
TABLET, FILM COATED ORAL
Qty: 30 TABLET | Refills: 2 | Status: SHIPPED | OUTPATIENT
Start: 2022-09-08

## 2022-09-08 RX ORDER — INSULIN GLARGINE 100 [IU]/ML
15 INJECTION, SOLUTION SUBCUTANEOUS NIGHTLY
Refills: 5 | OUTPATIENT
Start: 2022-09-08

## 2022-10-03 RX ORDER — INSULIN GLARGINE 300 U/ML
INJECTION, SOLUTION SUBCUTANEOUS
Qty: 9 ML | Refills: 0 | Status: SHIPPED | OUTPATIENT
Start: 2022-10-03

## 2022-12-02 DIAGNOSIS — Z79.899 OTHER LONG TERM (CURRENT) DRUG THERAPY: ICD-10-CM

## 2022-12-02 DIAGNOSIS — E11.65 TYPE 2 DIABETES MELLITUS WITH HYPERGLYCEMIA (HCC): ICD-10-CM

## 2022-12-02 RX ORDER — BENAZEPRIL HYDROCHLORIDE 5 MG/1
TABLET, FILM COATED ORAL
Qty: 30 TABLET | Refills: 2 | Status: SHIPPED | OUTPATIENT
Start: 2022-12-02

## 2023-01-03 DIAGNOSIS — E11.65 TYPE 2 DIABETES MELLITUS WITH HYPERGLYCEMIA, WITHOUT LONG-TERM CURRENT USE OF INSULIN (HCC): ICD-10-CM

## 2023-01-03 RX ORDER — PEN NEEDLE, DIABETIC 32GX 5/32"
NEEDLE, DISPOSABLE MISCELLANEOUS
Qty: 100 EACH | Refills: 3 | Status: SHIPPED | OUTPATIENT
Start: 2023-01-03

## 2023-01-11 ENCOUNTER — OFFICE VISIT (OUTPATIENT)
Dept: ENDOCRINOLOGY CLINIC | Facility: CLINIC | Age: 58
End: 2023-01-11
Payer: COMMERCIAL

## 2023-01-11 VITALS
DIASTOLIC BLOOD PRESSURE: 70 MMHG | SYSTOLIC BLOOD PRESSURE: 116 MMHG | RESPIRATION RATE: 17 BRPM | HEART RATE: 70 BPM | WEIGHT: 180.38 LBS | BODY MASS INDEX: 27 KG/M2

## 2023-01-11 DIAGNOSIS — I10 ESSENTIAL HYPERTENSION: ICD-10-CM

## 2023-01-11 DIAGNOSIS — E78.5 DYSLIPIDEMIA: Primary | ICD-10-CM

## 2023-01-11 DIAGNOSIS — E11.65 TYPE 2 DIABETES MELLITUS WITH HYPERGLYCEMIA, WITHOUT LONG-TERM CURRENT USE OF INSULIN (HCC): ICD-10-CM

## 2023-01-11 LAB
CARTRIDGE LOT#: 353 NUMERIC
CREAT UR-SCNC: <13 MG/DL
HEMOGLOBIN A1C: 7.6 % (ref 4.3–5.6)
MICROALBUMIN UR-MCNC: 0.61 MG/DL

## 2023-01-11 PROCEDURE — 82570 ASSAY OF URINE CREATININE: CPT | Performed by: NURSE PRACTITIONER

## 2023-01-11 PROCEDURE — 3074F SYST BP LT 130 MM HG: CPT | Performed by: NURSE PRACTITIONER

## 2023-01-11 PROCEDURE — 82043 UR ALBUMIN QUANTITATIVE: CPT | Performed by: NURSE PRACTITIONER

## 2023-01-11 PROCEDURE — 95251 CONT GLUC MNTR ANALYSIS I&R: CPT | Performed by: NURSE PRACTITIONER

## 2023-01-11 PROCEDURE — 83036 HEMOGLOBIN GLYCOSYLATED A1C: CPT | Performed by: NURSE PRACTITIONER

## 2023-01-11 PROCEDURE — 99214 OFFICE O/P EST MOD 30 MIN: CPT | Performed by: NURSE PRACTITIONER

## 2023-01-11 PROCEDURE — 3078F DIAST BP <80 MM HG: CPT | Performed by: NURSE PRACTITIONER

## 2023-01-11 RX ORDER — SEMAGLUTIDE 1.34 MG/ML
1 INJECTION, SOLUTION SUBCUTANEOUS WEEKLY
Qty: 3 ML | Refills: 5 | Status: SHIPPED | OUTPATIENT
Start: 2023-01-11

## 2023-01-11 RX ORDER — FLASH GLUCOSE SENSOR
1 KIT MISCELLANEOUS
Qty: 2 EACH | Refills: 11 | Status: SHIPPED | OUTPATIENT
Start: 2023-01-11

## 2023-01-11 RX ORDER — PEN NEEDLE, DIABETIC 32GX 5/32"
1 NEEDLE, DISPOSABLE MISCELLANEOUS DAILY
Qty: 100 EACH | Refills: 3 | Status: SHIPPED | OUTPATIENT
Start: 2023-01-11

## 2023-01-11 RX ORDER — EMPAGLIFLOZIN 25 MG/1
25 TABLET, FILM COATED ORAL EVERY MORNING
Qty: 30 TABLET | Refills: 5 | Status: SHIPPED | OUTPATIENT
Start: 2023-01-11

## 2023-01-11 RX ORDER — INSULIN GLARGINE 300 U/ML
INJECTION, SOLUTION SUBCUTANEOUS
Qty: 6 ML | Refills: 1 | Status: SHIPPED | OUTPATIENT
Start: 2023-01-11

## 2023-01-22 DIAGNOSIS — E78.2 MIXED HYPERLIPIDEMIA: ICD-10-CM

## 2023-01-22 DIAGNOSIS — Z79.899 ENCOUNTER FOR LONG-TERM CURRENT USE OF MEDICATION: ICD-10-CM

## 2023-01-22 DIAGNOSIS — E78.6 LOW HDL (UNDER 40): ICD-10-CM

## 2023-01-23 DIAGNOSIS — J30.2 SEASONAL ALLERGIC RHINITIS, UNSPECIFIED TRIGGER: ICD-10-CM

## 2023-01-24 RX ORDER — ROSUVASTATIN CALCIUM 20 MG/1
TABLET, COATED ORAL
Qty: 90 TABLET | Refills: 1 | Status: SHIPPED | OUTPATIENT
Start: 2023-01-24

## 2023-01-25 RX ORDER — MONTELUKAST SODIUM 10 MG/1
TABLET ORAL
Qty: 30 TABLET | Refills: 2 | Status: SHIPPED | OUTPATIENT
Start: 2023-01-25

## 2023-03-06 RX ORDER — INSULIN GLARGINE 300 U/ML
INJECTION, SOLUTION SUBCUTANEOUS
Qty: 4.5 ML | Refills: 1 | Status: SHIPPED | OUTPATIENT
Start: 2023-03-06

## 2023-03-08 DIAGNOSIS — Z79.899 OTHER LONG TERM (CURRENT) DRUG THERAPY: ICD-10-CM

## 2023-03-08 DIAGNOSIS — E11.65 TYPE 2 DIABETES MELLITUS WITH HYPERGLYCEMIA (HCC): ICD-10-CM

## 2023-03-08 RX ORDER — BENAZEPRIL HYDROCHLORIDE 5 MG/1
TABLET, FILM COATED ORAL
Qty: 30 TABLET | Refills: 0 | Status: SHIPPED | OUTPATIENT
Start: 2023-03-08

## 2023-03-31 DIAGNOSIS — E11.65 TYPE 2 DIABETES MELLITUS WITH HYPERGLYCEMIA (HCC): ICD-10-CM

## 2023-03-31 DIAGNOSIS — Z79.899 OTHER LONG TERM (CURRENT) DRUG THERAPY: ICD-10-CM

## 2023-03-31 RX ORDER — BENAZEPRIL HYDROCHLORIDE 5 MG/1
TABLET, FILM COATED ORAL
Qty: 30 TABLET | Refills: 0 | OUTPATIENT
Start: 2023-03-31

## 2023-03-31 NOTE — TELEPHONE ENCOUNTER
Tried calling pt to schedule appt for additional refills. Mailbox was full. MyChart message sent. Will try calling again later.

## 2023-06-21 RX ORDER — SEMAGLUTIDE 1.34 MG/ML
1 INJECTION, SOLUTION SUBCUTANEOUS WEEKLY
Qty: 9 ML | Refills: 0 | Status: SHIPPED | OUTPATIENT
Start: 2023-06-21

## 2023-07-08 DIAGNOSIS — E78.6 LOW HDL (UNDER 40): ICD-10-CM

## 2023-07-08 DIAGNOSIS — Z79.899 ENCOUNTER FOR LONG-TERM CURRENT USE OF MEDICATION: ICD-10-CM

## 2023-07-08 DIAGNOSIS — E78.2 MIXED HYPERLIPIDEMIA: ICD-10-CM

## 2023-07-11 DIAGNOSIS — E11.65 TYPE 2 DIABETES MELLITUS WITH HYPERGLYCEMIA (HCC): ICD-10-CM

## 2023-07-11 DIAGNOSIS — Z79.899 OTHER LONG TERM (CURRENT) DRUG THERAPY: ICD-10-CM

## 2023-07-12 DIAGNOSIS — Z79.899 ENCOUNTER FOR LONG-TERM CURRENT USE OF MEDICATION: ICD-10-CM

## 2023-07-12 DIAGNOSIS — E78.2 MIXED HYPERLIPIDEMIA: ICD-10-CM

## 2023-07-12 DIAGNOSIS — E78.6 LOW HDL (UNDER 40): ICD-10-CM

## 2023-07-12 RX ORDER — BENAZEPRIL HYDROCHLORIDE 5 MG/1
5 TABLET, FILM COATED ORAL NIGHTLY
Qty: 30 TABLET | Refills: 0 | OUTPATIENT
Start: 2023-07-12

## 2023-07-12 RX ORDER — ROSUVASTATIN CALCIUM 20 MG/1
20 TABLET, COATED ORAL NIGHTLY
Qty: 30 TABLET | Refills: 5 | OUTPATIENT
Start: 2023-07-12

## 2023-07-19 ENCOUNTER — PATIENT MESSAGE (OUTPATIENT)
Dept: ENDOCRINOLOGY CLINIC | Facility: CLINIC | Age: 58
End: 2023-07-19

## 2023-07-19 ENCOUNTER — PATIENT MESSAGE (OUTPATIENT)
Dept: FAMILY MEDICINE CLINIC | Facility: CLINIC | Age: 58
End: 2023-07-19

## 2023-07-19 DIAGNOSIS — E11.65 TYPE 2 DIABETES MELLITUS WITH HYPERGLYCEMIA, WITHOUT LONG-TERM CURRENT USE OF INSULIN (HCC): Primary | ICD-10-CM

## 2023-07-19 DIAGNOSIS — R21 RASH AND NONSPECIFIC SKIN ERUPTION: Primary | ICD-10-CM

## 2023-07-20 NOTE — TELEPHONE ENCOUNTER
Future Appointments   Date Time Provider Marga Johanne   8/16/2023  9:15 AM BAO Menendez [facility] [place]     Patient due for labs pended   Mirco urine- 1/2023   Last A1c value was 7.6% done 1/11/2023.     Lipid 7/2022   COMP 7/2022

## 2023-07-20 NOTE — TELEPHONE ENCOUNTER
From: Staci Presley  To: BAO Juarez  Sent: 7/19/2023 4:27 PM CDT  Subject: Blood Work    Hello, I just want to make sure if I need to go to get blood work prior to my visit on 8/16? Jony Adamson If yes, I need you to send me blood work docs to go to [employer] to get them perform. Thanks.

## 2023-07-21 ENCOUNTER — HOSPITAL ENCOUNTER (EMERGENCY)
Age: 58
Discharge: HOME | End: 2023-07-21
Payer: COMMERCIAL

## 2023-07-21 VITALS
HEIGHT: 69 IN | HEART RATE: 91 BPM | TEMPERATURE: 98 F | DIASTOLIC BLOOD PRESSURE: 94 MMHG | BODY MASS INDEX: 25.18 KG/M2 | OXYGEN SATURATION: 98 % | RESPIRATION RATE: 16 BRPM | SYSTOLIC BLOOD PRESSURE: 139 MMHG | WEIGHT: 170 LBS

## 2023-07-21 DIAGNOSIS — L72.3 INFECTED SEBACEOUS CYST: Primary | ICD-10-CM

## 2023-07-21 DIAGNOSIS — L08.9 INFECTED SEBACEOUS CYST: Primary | ICD-10-CM

## 2023-07-21 PROCEDURE — 99284 EMERGENCY DEPT VISIT MOD MDM: CPT

## 2023-07-21 PROCEDURE — 99283 EMERGENCY DEPT VISIT LOW MDM: CPT

## 2023-07-21 PROCEDURE — 10061 I&D ABSCESS COMP/MULTIPLE: CPT

## 2023-07-21 RX ORDER — CEFADROXIL 500 MG/1
500 CAPSULE ORAL 2 TIMES DAILY
Qty: 14 CAPSULE | Refills: 0 | Status: SHIPPED | OUTPATIENT
Start: 2023-07-21 | End: 2023-07-28

## 2023-07-21 NOTE — ED INITIAL ASSESSMENT (HPI)
Abscess to right upper back, noticed this week, had one in the past in same area, no fevers or drainage

## 2023-07-21 NOTE — DISCHARGE INSTRUCTIONS
Take the antibiotic as directed. You can take acetaminophen 1000 mg every 6 hours as needed for pain. If you require additional medication for pain you can take ibuprofen 600 mg every 6 hours as needed for pain. Go to the [address] [place] [address] immediate care in 2 days for packing removal (Sunday, 7/23/2023). If you have new, changing or worsening symptoms return to the ER.

## 2023-07-23 ENCOUNTER — HOSPITAL ENCOUNTER (OUTPATIENT)
Age: 58
Discharge: HOME | End: 2023-07-23
Payer: COMMERCIAL

## 2023-07-23 VITALS
HEART RATE: 92 BPM | OXYGEN SATURATION: 96 % | SYSTOLIC BLOOD PRESSURE: 120 MMHG | TEMPERATURE: 98 F | DIASTOLIC BLOOD PRESSURE: 87 MMHG | RESPIRATION RATE: 18 BRPM

## 2023-07-23 DIAGNOSIS — Z48.00 ABSCESS PACKING REMOVAL: Primary | ICD-10-CM

## 2023-07-23 DIAGNOSIS — E11.65 TYPE 2 DIABETES MELLITUS WITH HYPERGLYCEMIA, WITHOUT LONG-TERM CURRENT USE OF INSULIN (HCC): ICD-10-CM

## 2023-07-23 PROCEDURE — 99211 OFF/OP EST MAY X REQ PHY/QHP: CPT

## 2023-07-23 NOTE — DISCHARGE INSTRUCTIONS
Continue antibiotic. Alejandra Kenia will be at [facility] Tuesday 10A-7:30P for wound recheck or return here. Follow up with surgery for other cysts. Change bandages twice daily.

## 2023-07-23 NOTE — TELEPHONE ENCOUNTER
From: Luc Armstrong  To: Tequila Alex DO  Sent: 7/19/2023 1:03 PM CDT  Subject: Medical question    Good afternoon, Dr. James Tomas. 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 would you take care this medical situation on your office or you would recommend someone else?     Thanks,

## 2023-07-24 ENCOUNTER — TELEPHONE (OUTPATIENT)
Dept: FAMILY MEDICINE CLINIC | Facility: CLINIC | Age: 58
End: 2023-07-24

## 2023-07-24 DIAGNOSIS — Z12.5 ENCOUNTER FOR SCREENING FOR MALIGNANT NEOPLASM OF PROSTATE: ICD-10-CM

## 2023-07-24 DIAGNOSIS — Z00.00 LABORATORY EXAMINATION ORDERED AS PART OF A ROUTINE GENERAL MEDICAL EXAMINATION: Primary | ICD-10-CM

## 2023-07-24 NOTE — TELEPHONE ENCOUNTER
Requested Prescriptions     Pending Prescriptions Disp Refills    METFORMIN HCL 1000 MG Oral Tab [Pharmacy Med Name: METFORMIN HCL 1,000 MG TABLET] 180 tablet 1     Sig: TAKE 1 TABLET BY MOUTH TWICE A DAY WITH MEALS     Future Appointments   Date Time Provider Marga Whiting   8/16/2023  9:15 AM BAO Honeycutt [facility] [place]     Last A1c value was 7.6% done 1/11/2023. Refill 01/11/23  LOV 01/11/23  eGFR NON-AFR.  AMERICAN  > OR = 60 mL/min/1.73m2 96   eGFR AFRICAN AMERICAN  > OR = 60 mL/min/1.73m2 111

## 2023-07-24 NOTE — TELEPHONE ENCOUNTER
Called pt and scheduled annual physical with Dr. Gunner Rosales. Pt would like to have lab orders ordered ahead of time for [facility].    Future Appointments   Date Time Provider Marga Whiting   9/9/2023  8:30 AM Hany Rueda, DO [facility] [place]     Pt would like paper copy of lab orders mailed to his home. Address on file was verified. Please advise. Thank you.

## 2023-07-24 NOTE — TELEPHONE ENCOUNTER
Please call patient, chart brought to my attention due to recent visit at the [facility] immediate care. Recommend patient make an appointment to see me for his annual wellness visit. Thanks.

## 2023-08-12 LAB
AMB EXT CALCIUM: 9.9
AMB EXT CARBON DIOXIDE: 28
AMB EXT CHLORIDE: 102
AMB EXT CHOL/HDL RATIO: 2.9
AMB EXT CHOLESTEROL, TOTAL: 113 MG/DL
AMB EXT CMP ALT: 20 U/L
AMB EXT CMP AST: 17 U/L
AMB EXT CREATININE: 0.93 MG/DL
AMB EXT GLUCOSE: 122 MG/DL
AMB EXT HDL CHOLESTEROL: 39 MG/DL
AMB EXT HGBA1C: 7.1 %
AMB EXT LDL CHOLESTEROL, DIRECT: 51 MG/DL
AMB EXT NON HDL CHOL: 74 MG/DL
AMB EXT POSTASSIUM: 4.6 MMOL/L
AMB EXT SODIUM: 139 MMOL/L
AMB EXT TOTAL PROTEIN: 7.7
AMB EXT TRIGLYCERIDES: 157 MG/DL

## 2023-08-13 LAB
ABSOLUTE BASOPHILS: 39 CELLS/UL (ref 0–200)
ABSOLUTE EOSINOPHILS: 140 CELLS/UL (ref 15–500)
ABSOLUTE LYMPHOCYTES: 2162 CELLS/UL (ref 850–3900)
ABSOLUTE MONOCYTES: 504 CELLS/UL (ref 200–950)
ABSOLUTE NEUTROPHILS: 2755 CELLS/UL (ref 1500–7800)
ALBUMIN/GLOBULIN RATIO: 1.4 (CALC) (ref 1–2.5)
ALBUMIN: 4.5 G/DL (ref 3.6–5.1)
ALKALINE PHOSPHATASE: 69 U/L (ref 35–144)
ALT: 20 U/L (ref 9–46)
AST: 17 U/L (ref 10–35)
BASOPHILS: 0.7 %
BILIRUBIN, TOTAL: 0.8 MG/DL (ref 0.2–1.2)
BUN: 18 MG/DL (ref 7–25)
CALCIUM: 9.9 MG/DL (ref 8.6–10.3)
CARBON DIOXIDE: 28 MMOL/L (ref 20–32)
CHLORIDE: 102 MMOL/L (ref 98–110)
CHOL/HDLC RATIO: 2.9 (CALC)
CHOLESTEROL, TOTAL: 113 MG/DL
CREATININE: 0.93 MG/DL (ref 0.7–1.3)
EGFR: 96 ML/MIN/1.73M2
EOSINOPHILS: 2.5 %
GLOBULIN: 3.2 G/DL (CALC) (ref 1.9–3.7)
GLUCOSE: 122 MG/DL (ref 65–99)
HDL CHOLESTEROL: 39 MG/DL
HEMATOCRIT: 46.3 % (ref 38.5–50)
HEMOGLOBIN A1C: 7.1 % OF TOTAL HGB
HEMOGLOBIN: 15.6 G/DL (ref 13.2–17.1)
LDL-CHOLESTEROL: 51 MG/DL (CALC)
LYMPHOCYTES: 38.6 %
MCH: 31.8 PG (ref 27–33)
MCHC: 33.7 G/DL (ref 32–36)
MCV: 94.5 FL (ref 80–100)
MONOCYTES: 9 %
MPV: 10.4 FL (ref 7.5–12.5)
NEUTROPHILS: 49.2 %
NON-HDL CHOLESTEROL: 74 MG/DL (CALC)
PLATELET COUNT: 206 THOUSAND/UL (ref 140–400)
POTASSIUM: 4.6 MMOL/L (ref 3.5–5.3)
PROTEIN, TOTAL: 7.7 G/DL (ref 6.1–8.1)
PSA, TOTAL: 0.35 NG/ML
RDW: 12 % (ref 11–15)
RED BLOOD CELL COUNT: 4.9 MILLION/UL (ref 4.2–5.8)
SODIUM: 139 MMOL/L (ref 135–146)
T4, FREE: 1.7 NG/DL (ref 0.8–1.8)
TRIGLYCERIDES: 157 MG/DL
TSH W/REFLEX TO FT4: 0.19 MIU/L (ref 0.4–4.5)
WHITE BLOOD CELL COUNT: 5.6 THOUSAND/UL (ref 3.8–10.8)

## 2023-08-15 ENCOUNTER — TELEPHONE (OUTPATIENT)
Dept: ENDOCRINOLOGY CLINIC | Facility: CLINIC | Age: 58
End: 2023-08-15

## 2023-08-15 LAB — EGFR: 96

## 2023-08-16 ENCOUNTER — OFFICE VISIT (OUTPATIENT)
Dept: ENDOCRINOLOGY CLINIC | Facility: CLINIC | Age: 58
End: 2023-08-16
Payer: COMMERCIAL

## 2023-08-16 VITALS
RESPIRATION RATE: 18 BRPM | OXYGEN SATURATION: 99 % | DIASTOLIC BLOOD PRESSURE: 60 MMHG | WEIGHT: 176 LBS | HEART RATE: 91 BPM | BODY MASS INDEX: 26 KG/M2 | SYSTOLIC BLOOD PRESSURE: 126 MMHG

## 2023-08-16 DIAGNOSIS — E78.5 DYSLIPIDEMIA: ICD-10-CM

## 2023-08-16 DIAGNOSIS — E11.65 TYPE 2 DIABETES MELLITUS WITH HYPERGLYCEMIA, WITHOUT LONG-TERM CURRENT USE OF INSULIN (HCC): Primary | ICD-10-CM

## 2023-08-16 PROCEDURE — 99214 OFFICE O/P EST MOD 30 MIN: CPT | Performed by: NURSE PRACTITIONER

## 2023-08-16 PROCEDURE — 3078F DIAST BP <80 MM HG: CPT | Performed by: NURSE PRACTITIONER

## 2023-08-16 PROCEDURE — 3074F SYST BP LT 130 MM HG: CPT | Performed by: NURSE PRACTITIONER

## 2023-08-16 PROCEDURE — 95251 CONT GLUC MNTR ANALYSIS I&R: CPT | Performed by: NURSE PRACTITIONER

## 2023-08-16 RX ORDER — ROSUVASTATIN CALCIUM 20 MG/1
20 TABLET, COATED ORAL NIGHTLY
Qty: 90 TABLET | Refills: 1 | Status: SHIPPED | OUTPATIENT
Start: 2023-08-16

## 2023-08-16 RX ORDER — BENAZEPRIL HYDROCHLORIDE 5 MG/1
5 TABLET, FILM COATED ORAL NIGHTLY
Qty: 90 TABLET | Refills: 1 | Status: SHIPPED | OUTPATIENT
Start: 2023-08-16

## 2023-08-16 RX ORDER — EMPAGLIFLOZIN 25 MG/1
25 TABLET, FILM COATED ORAL EVERY MORNING
Qty: 90 TABLET | Refills: 1 | Status: SHIPPED | OUTPATIENT
Start: 2023-08-16

## 2023-08-16 RX ORDER — PEN NEEDLE, DIABETIC 32GX 5/32"
1 NEEDLE, DISPOSABLE MISCELLANEOUS DAILY
Qty: 100 EACH | Refills: 3 | Status: SHIPPED | OUTPATIENT
Start: 2023-08-16

## 2023-08-16 RX ORDER — SEMAGLUTIDE 1.34 MG/ML
1 INJECTION, SOLUTION SUBCUTANEOUS WEEKLY
Qty: 9 ML | Refills: 0 | Status: SHIPPED | OUTPATIENT
Start: 2023-08-16

## 2023-08-16 RX ORDER — INSULIN GLARGINE 300 U/ML
INJECTION, SOLUTION SUBCUTANEOUS
Qty: 4.5 ML | Refills: 3 | Status: SHIPPED | OUTPATIENT
Start: 2023-08-16

## 2023-09-09 ENCOUNTER — OFFICE VISIT (OUTPATIENT)
Dept: FAMILY MEDICINE CLINIC | Facility: CLINIC | Age: 58
End: 2023-09-09
Payer: COMMERCIAL

## 2023-09-09 VITALS
SYSTOLIC BLOOD PRESSURE: 98 MMHG | OXYGEN SATURATION: 98 % | TEMPERATURE: 97 F | HEART RATE: 72 BPM | BODY MASS INDEX: 26.22 KG/M2 | DIASTOLIC BLOOD PRESSURE: 68 MMHG | WEIGHT: 177 LBS | HEIGHT: 69 IN

## 2023-09-09 DIAGNOSIS — R10.10 UPPER ABDOMINAL PAIN: ICD-10-CM

## 2023-09-09 DIAGNOSIS — Z23 NEED FOR VACCINATION: ICD-10-CM

## 2023-09-09 DIAGNOSIS — R19.8 ALTERNATING CONSTIPATION AND DIARRHEA: ICD-10-CM

## 2023-09-09 DIAGNOSIS — R22.2 MASS OF SUBCUTANEOUS TISSUE OF BACK: ICD-10-CM

## 2023-09-09 DIAGNOSIS — Z79.4 TYPE 2 DIABETES MELLITUS WITH HYPERGLYCEMIA, WITH LONG-TERM CURRENT USE OF INSULIN (HCC): ICD-10-CM

## 2023-09-09 DIAGNOSIS — Z00.00 ROUTINE GENERAL MEDICAL EXAMINATION AT A HEALTH CARE FACILITY: Primary | ICD-10-CM

## 2023-09-09 DIAGNOSIS — E11.65 TYPE 2 DIABETES MELLITUS WITH HYPERGLYCEMIA, WITH LONG-TERM CURRENT USE OF INSULIN (HCC): ICD-10-CM

## 2023-09-09 PROCEDURE — 90715 TDAP VACCINE 7 YRS/> IM: CPT | Performed by: FAMILY MEDICINE

## 2023-09-09 PROCEDURE — 90471 IMMUNIZATION ADMIN: CPT | Performed by: FAMILY MEDICINE

## 2023-09-09 PROCEDURE — 3008F BODY MASS INDEX DOCD: CPT | Performed by: FAMILY MEDICINE

## 2023-09-09 PROCEDURE — 99214 OFFICE O/P EST MOD 30 MIN: CPT | Performed by: FAMILY MEDICINE

## 2023-09-09 PROCEDURE — 99396 PREV VISIT EST AGE 40-64: CPT | Performed by: FAMILY MEDICINE

## 2023-09-09 PROCEDURE — 90472 IMMUNIZATION ADMIN EACH ADD: CPT | Performed by: FAMILY MEDICINE

## 2023-09-09 PROCEDURE — 90686 IIV4 VACC NO PRSV 0.5 ML IM: CPT | Performed by: FAMILY MEDICINE

## 2023-09-09 PROCEDURE — 90750 HZV VACC RECOMBINANT IM: CPT | Performed by: FAMILY MEDICINE

## 2023-09-09 PROCEDURE — 3078F DIAST BP <80 MM HG: CPT | Performed by: FAMILY MEDICINE

## 2023-09-09 PROCEDURE — 3074F SYST BP LT 130 MM HG: CPT | Performed by: FAMILY MEDICINE

## 2023-09-24 PROBLEM — R22.2 MASS OF SUBCUTANEOUS TISSUE OF BACK: Status: ACTIVE | Noted: 2023-09-24

## 2023-12-20 ENCOUNTER — OFFICE VISIT (OUTPATIENT)
Dept: ENDOCRINOLOGY CLINIC | Facility: CLINIC | Age: 58
End: 2023-12-20
Payer: COMMERCIAL

## 2023-12-20 VITALS
BODY MASS INDEX: 26 KG/M2 | HEART RATE: 75 BPM | RESPIRATION RATE: 17 BRPM | SYSTOLIC BLOOD PRESSURE: 118 MMHG | DIASTOLIC BLOOD PRESSURE: 62 MMHG | WEIGHT: 173.81 LBS | OXYGEN SATURATION: 97 %

## 2023-12-20 DIAGNOSIS — I10 ESSENTIAL HYPERTENSION: ICD-10-CM

## 2023-12-20 DIAGNOSIS — E11.65 TYPE 2 DIABETES MELLITUS WITH HYPERGLYCEMIA, WITHOUT LONG-TERM CURRENT USE OF INSULIN (HCC): Primary | ICD-10-CM

## 2023-12-20 DIAGNOSIS — E78.5 DYSLIPIDEMIA: ICD-10-CM

## 2023-12-20 LAB
CARTRIDGE LOT#: 634 NUMERIC
HEMOGLOBIN A1C: 7 % (ref 4.3–5.6)

## 2023-12-20 PROCEDURE — 95251 CONT GLUC MNTR ANALYSIS I&R: CPT | Performed by: NURSE PRACTITIONER

## 2023-12-20 PROCEDURE — 3078F DIAST BP <80 MM HG: CPT | Performed by: NURSE PRACTITIONER

## 2023-12-20 PROCEDURE — 99214 OFFICE O/P EST MOD 30 MIN: CPT | Performed by: NURSE PRACTITIONER

## 2023-12-20 PROCEDURE — 83036 HEMOGLOBIN GLYCOSYLATED A1C: CPT | Performed by: NURSE PRACTITIONER

## 2023-12-20 PROCEDURE — 3074F SYST BP LT 130 MM HG: CPT | Performed by: NURSE PRACTITIONER

## 2023-12-20 RX ORDER — INSULIN LISPRO-AABC 100 [IU]/ML
INJECTION, SOLUTION SUBCUTANEOUS
Qty: 6 ML | Refills: 0 | Status: SHIPPED | OUTPATIENT
Start: 2023-12-20

## 2024-01-04 DIAGNOSIS — E11.65 TYPE 2 DIABETES MELLITUS WITH HYPERGLYCEMIA, WITHOUT LONG-TERM CURRENT USE OF INSULIN (HCC): ICD-10-CM

## 2024-01-05 RX ORDER — SEMAGLUTIDE 1.34 MG/ML
1 INJECTION, SOLUTION SUBCUTANEOUS WEEKLY
Qty: 9 ML | Refills: 0 | Status: SHIPPED | OUTPATIENT
Start: 2024-01-05

## 2024-01-05 NOTE — TELEPHONE ENCOUNTER
Requested Prescriptions     Pending Prescriptions Disp Refills    semaglutide (OZEMPIC, 1 MG/DOSE,) 4 MG/3ML Subcutaneous Solution Pen-injector 9 mL 0     Sig: Inject 1 mg into the skin once a week.     Your appointments       Date & Time Appointment Department ([place])    Hemant 10, 2024 11:00 AM CST Exam - New Patient with Saloni Jacob MD [facility], [facility] ([facility])        Jan 12, 2024 10:00 AM CST Follow-Up OV with Pam Barrios APRN [facility] ([facility])    Please arrive 15 minutes prior to your scheduled appointment time.         Jun 19, 2024  8:00 AM CDT Diabetes Pump follow up with Ashley Main APRN [facility] ([facility])              [facility]  [address] [facility] [address] [facility] [id number] [phone] [facility]  [address]  Critical access hospital [id number] [phone] [facility]  [facility]  [address] [address] [phone]          HGBA1C:    Lab Results   Component Value Date    A1C 7.0 (A) 12/20/2023    A1C 7.1 (H) 08/12/2023    A1C 7.1 08/12/2023     (H) 02/26/2014     LOV: 12-20-23    REFILL: 8-16-23

## 2024-01-30 DIAGNOSIS — E11.65 TYPE 2 DIABETES MELLITUS WITH HYPERGLYCEMIA, WITHOUT LONG-TERM CURRENT USE OF INSULIN (HCC): ICD-10-CM

## 2024-01-30 RX ORDER — ROSUVASTATIN CALCIUM 20 MG/1
20 TABLET, COATED ORAL DAILY
Qty: 90 TABLET | Refills: 2 | Status: SHIPPED | OUTPATIENT
Start: 2024-01-30

## 2024-01-30 RX ORDER — BENAZEPRIL HYDROCHLORIDE 5 MG/1
5 TABLET ORAL DAILY
Qty: 90 TABLET | Refills: 2 | Status: SHIPPED | OUTPATIENT
Start: 2024-01-30

## 2024-01-30 NOTE — TELEPHONE ENCOUNTER
Requested Prescriptions     Pending Prescriptions Disp Refills    ROSUVASTATIN 20 MG Oral Tab [Pharmacy Med Name: ROSUVASTATIN CALCIUM 20 MG TAB] 30 tablet 5     Sig: TAKE 1 TABLET BY MOUTH EVERY DAY AT NIGHT    BENAZEPRIL HCL 5 MG Oral Tab [Pharmacy Med Name: BENAZEPRIL HCL 5 MG TABLET] 30 tablet 5     Sig: TAKE 1 TABLET BY MOUTH EVERY DAY AT NIGHT     Your appointments       Date & Time Appointment Department ([place])    Feb 05, 2024  8:45 AM CST Exam - New Patient with Saloni Jacob MD [facility], [facility] ([facility])        Feb 12, 2024 10:15 AM CST EGD with Dorian Osei MD [facility] ([facility])    Please arrive 60 minutes prior to your scheduled procedure time.         Jun 19, 2024  8:00 AM CDT Diabetes Pump follow up with Ashley Main APRN [facility] ([facility])              [facility]  [address] [address] [phone] [facility]  [address]  [facility] [id number] [phone] [facility]  [facility]  [address] [address] [phone]          Last A1c value was 7% done 12/20/2023.    Refill 8/16/23  LOV 12/20/23

## 2024-02-05 ENCOUNTER — OFFICE VISIT (OUTPATIENT)
Facility: LOCATION | Age: 59
End: 2024-02-05
Payer: COMMERCIAL

## 2024-02-05 VITALS — TEMPERATURE: 99 F | HEART RATE: 87 BPM

## 2024-02-05 DIAGNOSIS — L72.3 SEBACEOUS CYST: Primary | ICD-10-CM

## 2024-02-05 NOTE — H&P
New Patient Visit Note       Active Problems      1. Sebaceous cyst        Chief Complaint   Chief Complaint   Patient presents with    New Patient     NP - Mass of subcutaneous tissue of back, patient has had the mass twice before, painful once they get infected, drainage, bleeding, patient has had minor surgery in the ER for the mass, patient would like a second opinion, several increased in size, no other symptoms.       History of Present Illness   The patient is a 58-year-old gentleman seen at the request of his primary care physician regarding sebaceous cysts.  The patient states over his right shoulder blade, he had pain and swelling.  He went to the emergency room where he underwent incision and drainage.  A year and a half later, last July, the swelling returned.  This time, it was drained by nurse practitioner.  The patient states he has a similar type mass growing in the middle of his back as well as one on his right shoulder.  The one on the right shoulder is more of a \"pimple.\"  The larger 1 in the 1 he needed drained in the past both started as pimples.  He denies fevers and chills.  He denies active drainage from the sites.    PCP:  Mela Edmondson, DO [place]  Hugo is allergic to adhesive tape and neosporin [neomycin-bacitracin-polymyxin].    Past Medical / Surgical / Social / Family History    The past medical and past surgical history have been reviewed by me today.    Past Medical History:   Diagnosis Date    Balanitis 09/29/2015    Likely candidal secondary to diabetes     ED (erectile dysfunction) 02/27/2013    Esophageal reflux     Essential hypertension, benign 11/01/2014    Heartburn 3months    High cholesterol 01/2015    History of thyroid cancer 05/25/2013    Hyperlipidemia LDL goal < 100 11/30/2012    Low HDL (under 40) 11/01/2014    Mass of subcutaneous tissue 01/07/2016    Other specified visual disturbances 06/27/2011    Papillary thyroid carcinoma (HCC)     Dx in 12/2011:  Papillary thyroid carcinoma measuring 0.9 cm in the right lobe - T1NxMx - Stage I    Postsurgical hypothyroidism     Dx in 12/2011 - total thyroidectomy for thyroid cancer    Type 2 diabetes mellitus with hyperglycemia, without long-term current use of insulin (HCC) 09/27/2016    Type II or unspecified type diabetes mellitus without mention of complication, not stated as uncontrolled 06/29/2012    Vitamin D deficiency     Dx in 2/2013     Past Surgical History:   Procedure Laterality Date    COLONOSCOPY  03/13/2020    Dr [facility]    OTHER SURGICAL HISTORY      Surgery for veins in the legs    THYROIDECTOMY      On 12/19/2011: Total thyroidectomy for thyroid cancer by Dr. Estrada       The family history and social history have been reviewed by me today.    Family History   Problem Relation Age of Onset    Heart Disease Maternal Aunt     Cancer Maternal Uncle     Stroke Neg     Thyroid Disorder Neg     Diabetes Neg      Social History     Socioeconomic History    Marital status:     Number of children: 2   Occupational History    Occupation: Operations Mgmt   Tobacco Use    Smoking status: Never    Smokeless tobacco: Never   Vaping Use    Vaping Use: Never used   Substance and Sexual Activity    Alcohol use: Yes     Alcohol/week: 2.0 - 3.0 standard drinks of alcohol     Types: 1 Glasses of wine, 1 - 2 Cans of beer per week     Comment: Family events  only    Drug use: No   Other Topics Concern    Caffeine Concern Yes     Comment: 3 cups of coffee per day    Exercise Yes     Comment: twice a week    Seat Belt Yes        Current Outpatient Medications:     rosuvastatin 20 MG Oral Tab, Take 1 tablet (20 mg total) by mouth daily., Disp: 90 tablet, Rfl: 2    Benazepril HCl 5 MG Oral Tab, Take 1 tablet (5 mg total) by mouth daily., Disp: 90 tablet, Rfl: 2    pantoprazole 40 MG Oral Tab EC, Take one tablet (40 mg total) by mouth once daily, 30 minutes prior to breakfast., Disp: 90 tablet, Rfl: 0     semaglutide (OZEMPIC, 1 MG/DOSE,) 4 MG/3ML Subcutaneous Solution Pen-injector, Inject 1 mg into the skin once a week., Disp: 9 mL, Rfl: 0    Insulin Lispro-aabc, 1 U Dial, (LYUMJEV KWIKPEN) 100 UNIT/ML Subcutaneous Solution Pen-injector, Uses up to 12 units daily as directed in divided doses based on BG readings, Disp: 6 mL, Rfl: 0    Insulin Glargine, 1 Unit Dial, (TOUJEO SOLOSTAR) 300 UNIT/ML Subcutaneous Solution Pen-injector, USES up to 30  UNITS DAILY AS DIRECTED (Patient taking differently: USES up to 28 UNITS DAILY AS DIRECTED), Disp: 4.5 mL, Rfl: 3    Continuous Blood Gluc Sensor (FREESTYLE LIO 2 SENSOR) Does not apply Misc, 1 Device every 14 (fourteen) days., Disp: 2 each, Rfl: 11    metFORMIN HCl 1000 MG Oral Tab, Take 1 tablet (1,000 mg total) by mouth 2 (two) times daily with meals., Disp: 180 tablet, Rfl: 1    Insulin Pen Needle (BD PEN NEEDLE GARCIA 2ND GEN) 32G X 4 MM Does not apply Misc, 1 strip by In Vitro route daily., Disp: 100 each, Rfl: 3    JARDIANCE 25 MG Oral Tab, Take 25 mg by mouth every morning., Disp: 90 tablet, Rfl: 1    MONTELUKAST 10 MG Oral Tab, TAKE 1 TABLET BY MOUTH EVERY DAY AT NIGHT, Disp: 30 tablet, Rfl: 2    Barberry-Oreg Grape-Goldenseal (BERBERINE COMPLEX OR), Take by mouth., Disp: , Rfl:     ASHWAGANDHA OR, Take 1,000 mg by mouth daily., Disp: , Rfl:     Omega-3 1000 MG Oral Cap, Take 1,000 mg by mouth daily. Take 2 dab daily, Disp: , Rfl:     Levothyroxine Sodium 137 MCG Oral Tab, Take 137 mcg by mouth every morning before breakfast., Disp: 90 tablet, Rfl: 3    diphenhydrAMINE HCl 25 MG Oral Tab, Take 1 tablet (25 mg total) by mouth every 6 (six) hours as needed for Itching., Disp: , Rfl:     CINNAMON OR, Take 1 tablet by mouth daily., Disp: , Rfl:     Coenzyme Q10 (CO Q-10) 100 MG Oral Cap, 200 mg. Take 1 tablet daily  , Disp: , Rfl:       Review of Systems  The Review of Systems has been reviewed by me during today.  Review of Systems   Constitutional: Negative.    HENT:  Negative.     Eyes: Negative.    Respiratory: Negative.     Cardiovascular: Negative.    Gastrointestinal: Negative.    Genitourinary: Negative.    Musculoskeletal: Negative.    Skin: Negative.    Neurological: Negative.    Psychiatric/Behavioral: Negative.         Physical Findings   Pulse 87   Temp 99.3 °F (37.4 °C) (Temporal)   Physical Exam  Constitutional:       Appearance: He is well-developed.   HENT:      Head: Normocephalic and atraumatic.   Eyes:      General: No scleral icterus.     Conjunctiva/sclera: Conjunctivae normal.   Neck:      Trachea: No tracheal deviation.   Cardiovascular:      Rate and Rhythm: Normal rate and regular rhythm.      Heart sounds: S1 normal and S2 normal. No murmur heard.  Pulmonary:      Effort: Pulmonary effort is normal. No accessory muscle usage or respiratory distress.      Breath sounds: No decreased breath sounds, wheezing, rhonchi or rales.   Chest:      Chest wall: No mass.   Skin:     General: Skin is warm and dry.          Neurological:      Mental Status: He is alert and oriented to person, place, and time.   Psychiatric:         Speech: Speech normal.         Behavior: Behavior normal.         Thought Content: Thought content normal.         Judgment: Judgment normal.             Assessment   1. Sebaceous cyst          Plan   The masses are consistent with sebaceous cysts.  I recommended definitive excision.  The risks, benefits, and alternatives were discussed with the patient in detail.  Risks included, but are not limited to, recurrence of the cysts, wound infection, and bleeding.  The patient is agreeable to proceed.  His surgery will be scheduled under sedation in the coming weeks.      Saloni Jacob MD

## 2024-02-12 PROBLEM — K21.00 REFLUX ESOPHAGITIS: Status: ACTIVE | Noted: 2024-02-12

## 2024-02-14 ENCOUNTER — TELEPHONE (OUTPATIENT)
Facility: LOCATION | Age: 59
End: 2024-02-14

## 2024-02-14 NOTE — TELEPHONE ENCOUNTER
ROMA LUX Patient  Member ID  [id number]    Date of Birth  1965-08-24    Gender  Male    Eligibility Status  Active Coverage    Group Number  [id number]    Plan / Coverage Date  2022-01-01    Transaction Type  Outpatient Authorization    Organization  [facility]    Payer  [employer] (COMMERCIAL & MEDICARE)    [employer] logo     Certificate Information  Reference Number  NA    Status  NO ACTION REQUIRED    Member Information  Patient Name  ROMA LUX    Patient Date of Birth  1965-08-24    Patient Gender  Male    Member ID  [id number]    Relationship to Subscriber  Self    Subscriber Name  ROMA LUX    Requesting Provider     Name  TOM NEIL    NPI  [id number]    Provider Role  Provider    Address  [facility] [facility] [place] [address]    Phone  [phone]  Contact Name  JONAS BANKS    Service Information  Service Type  -    [facility]  [facility]    Diagnosis Code 1  L723 - Sebaceous cyst    Procedure Code 1 (CPT/HCPCS)  87344 - EXC TR-EXT B9+VANESA 1.1-2 CM    Quantity  1 Units    Procedure From - To Date  2024-03-01    Status  NO ACTION REQUIRED    Message  NO PRECERT REQUIRED PLEASE REFER TO THE PROVIDER CODE SEARCH TOOL ON [employer] WEBSITE THE REQUESTED SERVICE MAY NOT BE ELIGIBLE FOR COVERAGE REFER TO ONLINE CLINICAL POLICY BULLETINS USING [employer]ET[employer] WEBSITE AND CONTACT PROVIDER SERVICES    Rendering Provider/Facility     Provider 1  Name  TOM NEIL    NPI  [id number]    Provider Role  Attending    Provider 2  Name  [facility]    NPI  [id number]    Provider Role  Facility

## 2024-02-14 NOTE — TELEPHONE ENCOUNTER
Spoke to patient about Dr. Jacob's departure. He was a little concerned about the switch in surgeons. He was making light jokes though and said he is fine with  Dr. Aguilar doing his procedure. He was on the fence about wanting to do a in-person or phone visit with Dr. Aguilar but appreciated the call.

## 2024-02-27 ENCOUNTER — TELEPHONE (OUTPATIENT)
Facility: LOCATION | Age: 59
End: 2024-02-27

## 2024-02-27 DIAGNOSIS — Z01.818 PRE-OP EVALUATION: Primary | ICD-10-CM

## 2024-02-29 DIAGNOSIS — E11.65 TYPE 2 DIABETES MELLITUS WITH HYPERGLYCEMIA, WITHOUT LONG-TERM CURRENT USE OF INSULIN (HCC): ICD-10-CM

## 2024-02-29 NOTE — TELEPHONE ENCOUNTER
Requested Prescriptions     Pending Prescriptions Disp Refills    METFORMIN HCL 1000 MG Oral Tab [Pharmacy Med Name: METFORMIN HCL 1,000 MG TABLET] 180 tablet 1     Sig: TAKE 1 TABLET BY MOUTH TWICE A DAY WITH MEALS     Your appointments       Date & Time Appointment Department (Center)    May 17, 2024  9:00 AM CDT  (Arrive by 8:45 AM) Follow-Up OV with Pam Barrios APRN [facility] ([facility])    Please arrive 15 minutes prior to your scheduled appointment time.         Jun 19, 2024  8:00 AM CDT Diabetes Pump follow up with Ashley Main APRN [facility] ([facility])              [facility]  [address]  [facility] [id number] [phone] [facility]  [facility]  [address] [address] [phone]          Last A1c value was 7% done 12/20/2023.    Refill 8/16/23  LOV 12/20/23

## 2024-05-14 DIAGNOSIS — E11.65 TYPE 2 DIABETES MELLITUS WITH HYPERGLYCEMIA, WITHOUT LONG-TERM CURRENT USE OF INSULIN (HCC): ICD-10-CM

## 2024-05-15 RX ORDER — SEMAGLUTIDE 1.34 MG/ML
1 INJECTION, SOLUTION SUBCUTANEOUS
Qty: 9 ML | Refills: 0 | Status: SHIPPED | OUTPATIENT
Start: 2024-05-15

## 2024-05-15 NOTE — TELEPHONE ENCOUNTER
Requested Prescriptions     Pending Prescriptions Disp Refills    OZEMPIC, 1 MG/DOSE, 4 MG/3ML Subcutaneous Solution Pen-injector [Pharmacy Med Name: OZEMPIC 4 MG/3 ML (1 MG/DOSE)]  0     Sig: INJECT 1MG INTO THE SKIN ONCE A WEEK     Your appointments       Date & Time Appointment Department ([place])    May 17, 2024 9:00 AM CDT  (Arrive by 8:45 AM) Follow-Up OV with Pam Santamaria APRN [facility] ([facility])    Please arrive 15 minutes prior to your scheduled appointment time.         Jun 19, 2024 8:00 AM CDT Diabetes Pump follow up with Ashley Main APRN [facility] ([facility])              [facility]  [address]  [facility] [id number] [phone] [facility]  [facility]  [address] [address] [phone]          Last A1c value was 7% done 12/20/2023.    Refill 1/5/24  LOV12/20/23

## 2024-06-12 ENCOUNTER — TELEPHONE (OUTPATIENT)
Facility: LOCATION | Age: 59
End: 2024-06-12

## 2024-06-12 NOTE — TELEPHONE ENCOUNTER
LUXROMA LEVY Patient  Member ID  [id number]    Date of Birth  1965-08-24    Gender  Male    Eligibility Status  Active Coverage    Group Number  [id number]    Plan / Coverage Date  2022-01-01    Transaction Type  Outpatient Authorization    [facility]    Payer  [facility] (COMMERCIAL & MEDICARE)    [facility] logo  Transaction ID: Not FoundCustomer ID: [id number] Date: NA  No Authorization Required  Place of Service  [facility]    Service From - To Date  NA    Admission Type  9    Diagnosis Code 1  L723 - Sebaceous cyst    Procedure Code 1  44446    Quantity  1 Units    Procedure From - To Date  2024-06-27    Status  NO AUTH REQUIRED    Message  No precert required. The requested service may not be eligible for coverage . Refer to the online Clinical Policy Bulletins or the Provider Code Search Tool on [employer]et website. You may also contact provider services using the Precert number on the member id card.  [id number]

## 2024-06-17 ENCOUNTER — TELEPHONE (OUTPATIENT)
Facility: LOCATION | Age: 59
End: 2024-06-17

## 2024-06-17 DIAGNOSIS — L72.3 SEBACEOUS CYST: Primary | ICD-10-CM

## 2024-06-18 NOTE — PROGRESS NOTES
Hugo Branham is a 58 year old  presents today for diabetes management.   Primary care physician: Mela Edmondson DO   Last DM appt     Today A1C 7.4 % ( last A1C 7.0%)    using [place] w reader   past 2 weeks: GMI 8.2%   He admits past 4 weeks he has noticed increase in trends   No changes in diet, stress or any acute health issues   Has been dosing lyumjev 2-3 units at least twice daily     In past NATALIA, Islet cell ab neg.     [facility] CGM Analysis of data:6.9.2024 thru 6.19.2024  Sensor download: full report in media  Average glucose :204 mg/dl (Last download: 157 mg/dl)      CV (coefficient of variation) : 22.1 %   GMI (estimated A1C) 8.2  %     49% time above 180mg /dl  ( last download:20 %)       16% time above 250 mg/dl ( last download: 2%)       35% time in target range:  mg/dl ( last download: 78 %)       0% time below 70mg/dl( last download: 0%)     0 %time below 54mg/dl ( last download: 0%)       Food/activity diary findings:   1. Pattern of hypoglycemia: none    2. pattern of hyperglycemia :spikes after dinner (HS snacks)    3. Time in target: 35 % (ADA recommended goal > 70%)         Diabetes History:  Type 2 DM ~ 2011   Patient has not had hospitalizations for blood sugar issues  denies any history of pancreatitis      2011 Papillary thyroid cancer: seeing Dr Anna , endocrinology annually       Previous DM therapies:  Glipizide: change in therapy   Victoza: /Farxiga :  formulary     Current DM Regimen:    Toujeo  solostar: 28 units once daily --> dosing 24 units   Ozempic 1.0 mg subcutaneous once weekly   Jardiance 25mg once daily    Metformin 1000mg twice daily   Lyumjev kwik pen before meals :     If blood sugar is under 200  before the meal : NO insulin   If blood sugar is 200- 220, take 2 units of insulin   If blood sugar is 221-260, take 3 units of insulin   If blood sugar is 261-300, take  4 units of insulin   If blood sugar is over 301, take 5 units of insulin       HGBA1C:    Lab  Results   Component Value Date    A1C 7.4 (A) 06/19/2024    A1C 7.0 (A) 12/20/2023    A1C 7.1 (H) 08/12/2023     (H) 02/26/2014       Lab Results   Component Value Date    CHOLEST 113 08/12/2023    CHOLEST 113 08/12/2023    TRIG 157 (H) 08/12/2023    TRIG 157 08/12/2023    HDL 39 (L) 08/12/2023    HDL 39 08/12/2023    LDL 51 08/12/2023    LDL 60 07/02/2022     Lab Results   Component Value Date    MICROALBCREA  01/11/2023      Comment:      Unable to calculate due to Urine Creatinine <13.00 mg/dL        Lab Results   Component Value Date    CREATSERUM 0.93 08/12/2023    CREATSERUM 0.93 08/12/2023    EGFRCR 96 08/12/2023     Lab Results   Component Value Date    AST 17 08/12/2023    AST 17 08/12/2023    ALT 20 08/12/2023    ALT 20 08/12/2023       Lab Results   Component Value Date    TSH 1.130 05/28/2019    TSH 0.731 05/12/2018    T4F 1.7 08/12/2023           Component      Latest Ref Rng & Units 9/2/2021   GLUTAMIC ACID$DECARBOXYLASE 65 AB      <5 IU/ml <5   ISLET CELL ANTIBODY SCREEN      neg neg       DM Complications:  Microvascular:   Neuropathy: no  Retinopathy: no  Nephropathy: no    Macrovascular:  PVD: no  CAD: no  Stroke/CVA: no    Modifying factors:  Medication adherence: yes   Recent steroids, illness or infections: ( past 90d) no     Allergies: Adhesive tape and Neosporin [neomycin-bacitracin-polymyxin]    Past Medical History:    Balanitis    Likely candidal secondary to diabetes     ED (erectile dysfunction)    Esophageal reflux    Essential hypertension, benign    Heartburn    High cholesterol    History of thyroid cancer    Hyperlipidemia LDL goal < 100    Low HDL (under 40)    Mass of subcutaneous tissue    Other specified visual disturbances    Papillary thyroid carcinoma (HCC)    Dx in 12/2011: Papillary thyroid carcinoma measuring 0.9 cm in the right lobe - T1NxMx - Stage I    Postsurgical hypothyroidism    Dx in 12/2011 - total thyroidectomy for thyroid cancer    Type 2 diabetes mellitus  with hyperglycemia, without long-term current use of insulin (HCC)    Type II or unspecified type diabetes mellitus without mention of complication, not stated as uncontrolled    Vitamin D deficiency    Dx in 2/2013     Past Surgical History:   Procedure Laterality Date    Colonoscopy  03/13/2020    Dr Osei at [facility]    Other surgical history      Surgery for veins in the legs    Thyroidectomy      On 12/19/2011: Total thyroidectomy for thyroid cancer by Dr. Estrada     Social History     Socioeconomic History    Marital status:     Number of children: 2   Occupational History    Occupation: Operations Mgmt   Tobacco Use    Smoking status: Never    Smokeless tobacco: Never   Vaping Use    Vaping status: Never Used   Substance and Sexual Activity    Alcohol use: Yes     Alcohol/week: 2.0 - 3.0 standard drinks of alcohol     Types: 1 Glasses of wine, 1 - 2 Cans of beer per week     Comment: Family events  only    Drug use: No   Other Topics Concern    Caffeine Concern Yes     Comment: 3 cups of coffee per day    Exercise Yes     Comment: twice a week    Seat Belt Yes     Family History   Problem Relation Age of Onset    Heart Disease Maternal Aunt     Cancer Maternal Uncle     Stroke Neg     Thyroid Disorder Neg     Diabetes Neg      Current Medication List:   Current Outpatient Medications   Medication Sig Dispense Refill    Continuous Glucose Sensor (FREESTYLE LIO 2 SENSOR) Does not apply Misc 1 each every 14 (fourteen) days. 2 each 11    semaglutide (OZEMPIC, 1 MG/DOSE,) 4 MG/3ML Subcutaneous Solution Pen-injector Inject 1 mg into the skin once a week. 9 mL 0    Insulin Lispro-aabc, 1 U Dial, (LYUMJEV KWIKPEN) 100 UNIT/ML Subcutaneous Solution Pen-injector Uses up to 12 units daily as directed in divided doses based on BG readings 15 mL 0    Insulin Glargine, 1 Unit Dial, (TOUJEO SOLOSTAR) 300 UNIT/ML Subcutaneous Solution Pen-injector USES up to 32  UNITS DAILY AS DIRECTED 4.5 mL 3    JARDIANCE  25 MG Oral Tab Take 25 mg by mouth every morning. 90 tablet 1    Insulin Pen Needle (BD PEN NEEDLE GARCIA 2ND GEN) 32G X 4 MM Does not apply Misc Up to 4 injections daily 300 each 3    pantoprazole 40 MG Oral Tab EC TAKE 1 TABLET BY MOUTH EVERY DAY 30 MINUTES BEFORE BREAKFAST 30 tablet 2    metFORMIN HCl 1000 MG Oral Tab Take 1 tablet (1,000 mg total) by mouth 2 (two) times daily with meals. 180 tablet 1    rosuvastatin 20 MG Oral Tab Take 1 tablet (20 mg total) by mouth daily. 90 tablet 2    Benazepril HCl 5 MG Oral Tab Take 1 tablet (5 mg total) by mouth daily. 90 tablet 2    MONTELUKAST 10 MG Oral Tab TAKE 1 TABLET BY MOUTH EVERY DAY AT NIGHT 30 tablet 2    Barberry-Oreg Grape-Goldenseal (BERBERINE COMPLEX OR) Take by mouth.      ASHWAGANDHA OR Take 1,000 mg by mouth daily.      Omega-3 1000 MG Oral Cap Take 1,000 mg by mouth daily. Take 2 dab daily      Levothyroxine Sodium 137 MCG Oral Tab Take 137 mcg by mouth every morning before breakfast. 90 tablet 3    diphenhydrAMINE HCl 25 MG Oral Tab Take 1 tablet (25 mg total) by mouth every 6 (six) hours as needed for Itching.      CINNAMON OR Take 1 tablet by mouth daily.      Coenzyme Q10 (CO Q-10) 100 MG Oral Cap 200 mg. Take 1 tablet daily            DM associated review of  symptoms:   Endocrine: Polyuria, polyphagia, polydipsia: no  Neurological: Paresthesias: no  HEENT: Blurred vision: no.   Skin: no rash or wounds  Hematological: Hypoglycemia: no    Review of Systems     LUNGS: denies shortness of breath   CARDIOVASCULAR: denies chest pain  GI: denies abdominal pain, nausea or diarrhea . + GERD sxs   : denies dysuria          Physical exam:  /68   Pulse 97   Resp 20   Wt 180 lb 3.2 oz (81.7 kg)   SpO2 97%   BMI 26.61 kg/m²   Body mass index is 26.61 kg/m².    Physical Exam   Vitals reviewed.  Constitutional: Normal appearance   Cardiovascular: Normal rate , rhythm   Pulmonary/Chest: Effort normal  Neurological: Alert and oriented .   Psychiatric:  Normal mood and affect.     Assessment/Plan:      Mixed hyperlipidemia  Last labs:8-2023 --> needs update   Taking Rosuvastatin and Omega 3 FA         Type 2 diabetes mellitus with hyperglycemia, without long-term current use of insulin ([facility])  A1C 7.4  % (last A1C 7.0%)     Weight:173   lb (last weight: 176 lb )   Diabetes control is increased.   Reminded patient health impact associated with improved glycemic targets     Will check Zinc anitbodies, cpeptide   Discussed insulin pump technology for improving TIR/BG targets and decrease injetion burden  Wearing kaleb 2 and desires to stay w Kaleb 2 - so ANNIE mayers is currently only pump integrated w KALEB cgm     For now:   Increase Toujeo 24 units-->  28 units once daily     After 4 days , if your fasting blood sugar is still above 150, increase Toujeo to 30 units once daily     Change Lyumjev kwik pen  dosing --> start at 180 (not 200)   If blood sugar is under 180  before the meal : NO insulin   If blood sugar is 180- 220, take 3 units of insulin   If blood sugar is 221-260, take 4 units of insulin   If blood sugar is 261-300, take  5 units of insulin   If blood sugar is over 301, take 6 units of insulin     Continue:   Kaleb 2 CGM (NOT interested in cell phone integration)   Jardiance 25mg once daily    Metformin 1000mg twice daily   Ozempic 1mg subcutaneous once weekly       Reminded about site rotation for subcutaneous injections     Reviewed clinical significance of A1c, adverse effects of suboptimal glucose control, and goals of therapy   Reviewed the A1C test, what the value reflects and the goal for the patient.   Reminded pt on A1C and blood sugar targets (Fasting < 130 and post prandial <180 ) and complications associated with hyperglycemia and uncontrolled DM (on AVS)   Recommended SMBG 3- x daily if not using CGM   Reviewed s/s and treatment of hypoglycemia (on AVS)   Continue with lifestyle modifications since they have positive impact on diabetes/blood  sugars/health (portion control, physical activity, weight loss)   Reinforced timing and adherence with medication, self-monitoring of blood glucose and routine follow up.   No hx nephropathy or CKD but pt desires to stay on ace rx.    Kaleb check in 4 weeks     The patient is asked to return in 3-4  m  but recommended to contact DM clinic sooner if questions or concerns.    The patient indicates understanding of these issues and agrees to the plan.      Orders Placed This Encounter    POC Hgb A1C     Order Specific Question:   Release to patient     Answer:   Immediate    Microalb/Creat Ratio, Random Urine [E]     Order Specific Question:   Release to patient     Answer:   Immediate    C-Peptide [E]     Order Specific Question:   Release to patient     Answer:   Immediate    Zinc Transporter 8 (ZnT8) Antibodies     Order Specific Question:   Release to patient     Answer:   Immediate    Comp Metabolic Panel (14)    Lipid Panel    Continuous Glucose Sensor (FREESTYLE KALEB 2 SENSOR) Does not apply Misc     Si each every 14 (fourteen) days.     Dispense:  2 each     Refill:  11    semaglutide (OZEMPIC, 1 MG/DOSE,) 4 MG/3ML Subcutaneous Solution Pen-injector     Sig: Inject 1 mg into the skin once a week.     Dispense:  9 mL     Refill:  0     DX Code Needed  .    Insulin Lispro-aabc, 1 U Dial, (LYUMJEV KWIKPEN) 100 UNIT/ML Subcutaneous Solution Pen-injector     Sig: Uses up to 12 units daily as directed in divided doses based on BG readings     Dispense:  15 mL     Refill:  0    Insulin Glargine, 1 Unit Dial, (TOUJEO SOLOSTAR) 300 UNIT/ML Subcutaneous Solution Pen-injector     Sig: USES up to 32  UNITS DAILY AS DIRECTED     Dispense:  4.5 mL     Refill:  3    JARDIANCE 25 MG Oral Tab     Sig: Take 25 mg by mouth every morning.     Dispense:  90 tablet     Refill:  1    Insulin Pen Needle (BD PEN NEEDLE GARCIA 2ND GEN) 32G X 4 MM Does not apply Misc     Sig: Up to 4 injections daily     Dispense:  300 each      Refill:  3     DM quality  A1C/Blood pressure: as reported above   Nephropathy screenin  Continue  ace rx per PCP   LIPID screening: last labs  Rosuvastatin rx.   Last dilated eye exam: No data recorded   Exam shows retinopathy? No data recorded  Last diabetic foot exam: No data recorded    Defer foot exam to follow up appointment   Has neftali morley opt      Discussed insulin pump technology- showed pt pump /infusion sets. Brochures provided for T slim as well as intro to pump patient handout     Spent 50 min obtaining patient history, evaluating patient, reviewing blood glucose trends, discussing treatment options, lifestyle modifications and completing documentation -this time does not including sensor interpretation time   The risks and benefits of my recommendations, as well as other treatment options were discussed with the patient today. questions were also answered to the best of my knowledge.

## 2024-06-19 ENCOUNTER — OFFICE VISIT (OUTPATIENT)
Dept: ENDOCRINOLOGY CLINIC | Facility: CLINIC | Age: 59
End: 2024-06-19

## 2024-06-19 VITALS
WEIGHT: 180.19 LBS | SYSTOLIC BLOOD PRESSURE: 116 MMHG | HEART RATE: 97 BPM | RESPIRATION RATE: 20 BRPM | BODY MASS INDEX: 27 KG/M2 | DIASTOLIC BLOOD PRESSURE: 68 MMHG | OXYGEN SATURATION: 97 %

## 2024-06-19 DIAGNOSIS — E11.65 TYPE 2 DIABETES MELLITUS WITH HYPERGLYCEMIA, WITHOUT LONG-TERM CURRENT USE OF INSULIN (HCC): Primary | ICD-10-CM

## 2024-06-19 DIAGNOSIS — I10 ESSENTIAL HYPERTENSION: ICD-10-CM

## 2024-06-19 DIAGNOSIS — E78.5 DYSLIPIDEMIA: ICD-10-CM

## 2024-06-19 LAB — HEMOGLOBIN A1C: 7.4 % (ref 4.3–5.6)

## 2024-06-19 PROCEDURE — 83036 HEMOGLOBIN GLYCOSYLATED A1C: CPT | Performed by: NURSE PRACTITIONER

## 2024-06-19 PROCEDURE — 99215 OFFICE O/P EST HI 40 MIN: CPT | Performed by: NURSE PRACTITIONER

## 2024-06-19 PROCEDURE — 95251 CONT GLUC MNTR ANALYSIS I&R: CPT | Performed by: NURSE PRACTITIONER

## 2024-06-19 RX ORDER — INSULIN LISPRO-AABC 100 [IU]/ML
INJECTION, SOLUTION SUBCUTANEOUS
Qty: 15 ML | Refills: 0 | Status: SHIPPED | OUTPATIENT
Start: 2024-06-19

## 2024-06-19 RX ORDER — PEN NEEDLE, DIABETIC 32GX 5/32"
NEEDLE, DISPOSABLE MISCELLANEOUS
Qty: 300 EACH | Refills: 3 | Status: SHIPPED | OUTPATIENT
Start: 2024-06-19

## 2024-06-19 RX ORDER — INSULIN GLARGINE 300 U/ML
INJECTION, SOLUTION SUBCUTANEOUS
Qty: 4.5 ML | Refills: 3 | Status: SHIPPED | OUTPATIENT
Start: 2024-06-19

## 2024-06-19 RX ORDER — EMPAGLIFLOZIN 25 MG/1
25 TABLET, FILM COATED ORAL EVERY MORNING
Qty: 90 TABLET | Refills: 1 | Status: SHIPPED | OUTPATIENT
Start: 2024-06-19

## 2024-06-19 RX ORDER — SEMAGLUTIDE 1.34 MG/ML
1 INJECTION, SOLUTION SUBCUTANEOUS WEEKLY
Qty: 9 ML | Refills: 0 | Status: SHIPPED | OUTPATIENT
Start: 2024-06-19

## 2024-06-19 NOTE — PATIENT INSTRUCTIONS
A1C 7.4% ( increased from 7.0%)     Please do your labs at [facility]; -     Increase Toujeo to 28 units once daily     After 4 days , if your fasting blood sugar is still above 150, increase Toujeo to 30 units once daily       Lyumjev    Before the meal: or 2 hour after meals:         If blood sugar is under 180  before the meal : NO insulin   If blood sugar is 180- 220, take 3 units of insulin   If blood sugar is 221-260, take 4 units of insulin   If blood sugar is 261-300, take  5 units of insulin   If blood sugar is over 301, take 6 units of insulin           Continue:   Kaleb 2 CGM   Jardiance 25mg once daily    Metformin 1000mg twice daily   Ozempic 1mg subcutaneous once weekly     Please read through pump brochures, intro to pump and see if this an option for you    Fluctuations in blood sugars are best detected by testing your sugar with your meter.   In order for me to determine any patterns in your blood sugars, you will need to test your blood sugar 1- 2 times daily       American Diabetes Association: blood sugar targets:     Fasting blood sugar (before breakfast) Target:    (ideally less than 110)  2 hours after eating less than 180 (ideally less than  150 )     Call for blood sugars less than  75 or greater than  200 more than 2 times in a week     If you are wearing the sensor, please look at your trends/averages    Recommendations:   GMI (estimated A1C ) target under  7%     Time in range (healthy blood sugar targets) : goal is over 70%   less than 70 : goal is less than 4%   Over 180 : goal is less than 20 %   Over 250: goal is  less than 5%       Watch for low blood sugars: (less than 70 )    Treatment of Low Blood Glucose Action Plan  1. Check blood glucose to be sure that it is low. You cannot  always go by symptoms or how you feel. If in doubt, treat your low blood glucose anyway.  Rule of 15 :     2. Take 15 grams of carbohydrate (carb). Here are some choices:    4 oz. regular fruit juice  3-4  glucose tablets  6 oz. regular soda   7-8 jelly beans    3. Recheck blood glucose after 10-15 minutes. If blood glucose is still low (less than 70 mg/dl) repeat the treatment (step 2).    4. If your next meal is more than one hour away, eat a small snack.    5. If you’re not sure what caused your low blood glucose, call your healthcare provider.    6. Always check your blood glucose before you drive

## 2024-06-21 LAB
CREATININE, RANDOM URINE: 108 MG/DL (ref 20–320)
MICROALBUMIN/CREATININE RATIO, RANDOM URINE: 19 MG/G CREAT
MICROALBUMIN: 2.1 MG/DL

## 2024-07-01 ENCOUNTER — TELEPHONE (OUTPATIENT)
Facility: LOCATION | Age: 59
End: 2024-07-01

## 2024-07-01 NOTE — TELEPHONE ENCOUNTER
ROMA LUX Patient  Member ID  [id number]    Date of Birth  1965-08-24    Gender  Male    Eligibility Status  Active Coverage    Group Number  [id number]    Plan / Coverage Date  2022-01-01    Transaction Type  Outpatient Authorization    Organization  [facility]    Payer  T (COMMERCIAL & MEDICARE)    [employer] logo  No Authorization Required  Place of Service  22 - On [facility]    Service From - To Date  NA    Admission Type  9    Diagnosis Code 1  L723 - Sebaceous cyst    Procedure Code 1  17761    Quantity  1 Units    Procedure From - To Date  2024-07-15    Status  NO AUTH REQUIRED    Message  No precert required. The requested service may not be eligible for coverage . Refer to the online Clinical Policy Bulletins or the Provider Code Search Tool on [employer] website. You may also contact provider services using the Precert number on the member id card.  [id number]

## 2024-07-02 ENCOUNTER — TELEPHONE (OUTPATIENT)
Dept: FAMILY MEDICINE CLINIC | Facility: CLINIC | Age: 59
End: 2024-07-02

## 2024-07-02 LAB
ALBUMIN/GLOBULIN RATIO: 1.3 (CALC) (ref 1–2.5)
ALBUMIN: 4 G/DL (ref 3.6–5.1)
ALKALINE PHOSPHATASE: 74 U/L (ref 35–144)
ALT: 17 U/L (ref 9–46)
AST: 15 U/L (ref 10–35)
BILIRUBIN, TOTAL: 0.4 MG/DL (ref 0.2–1.2)
BUN: 13 MG/DL (ref 7–25)
C-PEPTIDE: 1.53 NG/ML (ref 0.8–3.85)
CALCIUM: 8.7 MG/DL (ref 8.6–10.3)
CARBON DIOXIDE: 25 MMOL/L (ref 20–32)
CHLORIDE: 106 MMOL/L (ref 98–110)
CHOL/HDLC RATIO: 2.8 (CALC)
CHOLESTEROL, TOTAL: 110 MG/DL
CREATININE, RANDOM URINE: 28 MG/DL (ref 20–320)
CREATININE: 0.8 MG/DL (ref 0.7–1.3)
EGFR: 103 ML/MIN/1.73M2
GLOBULIN: 3 G/DL (CALC) (ref 1.9–3.7)
GLUCOSE: 145 MG/DL (ref 65–99)
HDL CHOLESTEROL: 40 MG/DL
LDL-CHOLESTEROL: 51 MG/DL (CALC)
MICROALBUMIN/CREATININE RATIO, RANDOM URINE: 14 MG/G CREAT
MICROALBUMIN: 0.4 MG/DL
NON-HDL CHOLESTEROL: 70 MG/DL (CALC)
POTASSIUM: 3.9 MMOL/L (ref 3.5–5.3)
PROTEIN, TOTAL: 7 G/DL (ref 6.1–8.1)
SODIUM: 138 MMOL/L (ref 135–146)
TRIGLYCERIDES: 102 MG/DL
ZINC TRANSPORTER 8 (ZNT8) ANTIBODY: <10 U/ML

## 2024-07-02 NOTE — TELEPHONE ENCOUNTER
Patient calling and in need of preop appointment with Dr. Mela Edmondson - surgery is 7/15/24 remove cyst on back at [facility], by Dr. Carlos Manuel Aguilar    Please advise.

## 2024-07-05 NOTE — TELEPHONE ENCOUNTER
Left voicemail for patient to call back to schedule appointment with Dr. Mela Edmondson for 7/8/24 for pre-op clearance.

## 2024-07-08 ENCOUNTER — OFFICE VISIT (OUTPATIENT)
Dept: FAMILY MEDICINE CLINIC | Facility: CLINIC | Age: 59
End: 2024-07-08
Payer: COMMERCIAL

## 2024-07-08 ENCOUNTER — TELEPHONE (OUTPATIENT)
Dept: ENDOCRINOLOGY CLINIC | Facility: CLINIC | Age: 59
End: 2024-07-08

## 2024-07-08 VITALS
HEART RATE: 96 BPM | OXYGEN SATURATION: 96 % | HEIGHT: 69 IN | DIASTOLIC BLOOD PRESSURE: 84 MMHG | SYSTOLIC BLOOD PRESSURE: 132 MMHG | TEMPERATURE: 98 F | BODY MASS INDEX: 26.69 KG/M2 | WEIGHT: 180.19 LBS | RESPIRATION RATE: 18 BRPM

## 2024-07-08 DIAGNOSIS — I25.84 CORONARY ARTERY CALCIFICATION OF NATIVE ARTERY: ICD-10-CM

## 2024-07-08 DIAGNOSIS — I25.10 CORONARY ARTERY CALCIFICATION OF NATIVE ARTERY: ICD-10-CM

## 2024-07-08 DIAGNOSIS — I51.5 CARDIAC CALCIFICATION (HCC): ICD-10-CM

## 2024-07-08 DIAGNOSIS — E78.5 TYPE 2 DIABETES MELLITUS WITH HYPERLIPIDEMIA (HCC): ICD-10-CM

## 2024-07-08 DIAGNOSIS — E11.65 TYPE 2 DIABETES MELLITUS WITH HYPERGLYCEMIA, WITH LONG-TERM CURRENT USE OF INSULIN (HCC): ICD-10-CM

## 2024-07-08 DIAGNOSIS — Z79.4 TYPE 2 DIABETES MELLITUS WITH HYPERGLYCEMIA, WITH LONG-TERM CURRENT USE OF INSULIN (HCC): ICD-10-CM

## 2024-07-08 DIAGNOSIS — Z01.818 PREOP EXAMINATION: Primary | ICD-10-CM

## 2024-07-08 DIAGNOSIS — E11.69 TYPE 2 DIABETES MELLITUS WITH HYPERLIPIDEMIA (HCC): ICD-10-CM

## 2024-07-08 DIAGNOSIS — E78.2 MIXED HYPERLIPIDEMIA: ICD-10-CM

## 2024-07-08 DIAGNOSIS — L72.3 SEBACEOUS CYST: ICD-10-CM

## 2024-07-08 DIAGNOSIS — R93.1 AGATSTON CORONARY ARTERY CALCIUM SCORE BETWEEN 200 AND 399: ICD-10-CM

## 2024-07-08 PROBLEM — R22.2 MASS OF SUBCUTANEOUS TISSUE OF BACK: Status: RESOLVED | Noted: 2023-09-24 | Resolved: 2024-07-08

## 2024-07-08 PROBLEM — I77.9 ARTERIAL DISEASE: Status: RESOLVED | Noted: 2022-03-28 | Resolved: 2024-07-08

## 2024-07-08 PROBLEM — K21.00 REFLUX ESOPHAGITIS: Status: RESOLVED | Noted: 2024-02-12 | Resolved: 2024-07-08

## 2024-07-08 PROBLEM — Z12.11 SPECIAL SCREENING FOR MALIGNANT NEOPLASM OF COLON: Status: RESOLVED | Noted: 2020-03-13 | Resolved: 2024-07-08

## 2024-07-08 PROBLEM — I77.9 ARTERIAL DISEASE (HCC): Status: RESOLVED | Noted: 2022-03-28 | Resolved: 2024-07-08

## 2024-07-08 PROBLEM — K63.5 POLYP OF COLON: Status: RESOLVED | Noted: 2020-03-13 | Resolved: 2024-07-08

## 2024-07-08 LAB
ATRIAL RATE: 81 BPM
P AXIS: 42 DEGREES
P-R INTERVAL: 156 MS
Q-T INTERVAL: 372 MS
QRS DURATION: 78 MS
QTC CALCULATION (BEZET): 432 MS
R AXIS: 26 DEGREES
T AXIS: 62 DEGREES
VENTRICULAR RATE: 81 BPM

## 2024-07-08 NOTE — PROGRESS NOTES
Hugo Branham is a 58 year old male.      Chief Complaint   Patient presents with    Pre-Op Exam     Procedure Excision of back cysts times three with Dr. Jeff Horowitz on 7/15/2024  Location [facility]     Diabetes    Hyperlipidemia    Other     Coronary artery disease           HPI:     This is a 58-year-old male with uncontrolled type 2 diabetes and asymptomatic coronary artery disease who presents for preop history and physical with medical risk assessment as requested by his surgeon Dr. Aguilar prior to excision of cysts of his back under MAC anesthesia to be performed at [facility] scheduled for 7/15/2024.      Patient denies diagnosis of sleep apnea.  Endorses that wife has told him that he snores at night but that she has also told him he does not stop breathing at night.    Patient denies history of blood clot, clotting disorder, or bleeding disorder.      Type 2 diabetes:  Uncontrolled, most recent A1c June 2024 was 7.4.  Patient is followed by the [place] [facility] for management of his diabetes.  Patient denies episodes of hypoglycemia.      Coronary artery disease/hyperlipidemia:  Patient is asymptomatic, denies chest pain, denies dyspnea with exertion, denies chest pain on exertion, denies palpitations.  Most recent lipids June 2024 to goal.            Current Outpatient Medications   Medication Sig Dispense Refill    Continuous Glucose Sensor (FREESTYLE LIO 2 SENSOR) Does not apply Misc 1 each every 14 (fourteen) days. 2 each 11    semaglutide (OZEMPIC, 1 MG/DOSE,) 4 MG/3ML Subcutaneous Solution Pen-injector Inject 1 mg into the skin once a week. 9 mL 0    Insulin Lispro-aabc, 1 U Dial, (LYUMJEV KWIKPEN) 100 UNIT/ML Subcutaneous Solution Pen-injector Uses up to 12 units daily as directed in divided doses based on BG readings 15 mL 0    Insulin Glargine, 1 Unit Dial, (TOUJEO SOLOSTAR) 300 UNIT/ML Subcutaneous Solution Pen-injector USES up to 32  UNITS DAILY AS DIRECTED 4.5  mL 3    JARDIANCE 25 MG Oral Tab Take 25 mg by mouth every morning. 90 tablet 1    Insulin Pen Needle (BD PEN NEEDLE GARCIA 2ND GEN) 32G X 4 MM Does not apply Misc Up to 4 injections daily 300 each 3    metFORMIN HCl 1000 MG Oral Tab Take 1 tablet (1,000 mg total) by mouth 2 (two) times daily with meals. 180 tablet 1    rosuvastatin 20 MG Oral Tab Take 1 tablet (20 mg total) by mouth daily. 90 tablet 2    Benazepril HCl 5 MG Oral Tab Take 1 tablet (5 mg total) by mouth daily. 90 tablet 2    Barberry-Oreg Grape-Goldenseal (BERBERINE COMPLEX OR) Take by mouth.      ASHWAGANDHA OR Take 1,000 mg by mouth daily.      Omega-3 1000 MG Oral Cap Take 1,000 mg by mouth daily. Take 2 dab daily      Levothyroxine Sodium 137 MCG Oral Tab Take 137 mcg by mouth every morning before breakfast. 90 tablet 3    CINNAMON OR Take 1 tablet by mouth daily.      Coenzyme Q10 (CO Q-10) 100 MG Oral Cap 200 mg. Take 1 tablet daily         pantoprazole 40 MG Oral Tab EC TAKE 1 TABLET BY MOUTH EVERY DAY 30 MINUTES BEFORE BREAKFAST 90 tablet 0    diphenhydrAMINE HCl 25 MG Oral Tab Take 1 tablet (25 mg total) by mouth every 6 (six) hours as needed for Itching. (Patient not taking: Reported on 7/8/2024)        Patient Active Problem List   Diagnosis    Asymptomatic varicose veins    Hx of papillary thyroid carcinoma    Postsurgical hypothyroidism    Mixed hyperlipidemia    Overweight (BMI 25.0-29.9)    Encounter for long-term current use of medication    Gastroesophageal reflux disease with esophagitis    Third degree hemorrhoids    Type 2 diabetes mellitus with hyperglycemia, with long-term current use of insulin (HCC)    Seasonal allergic rhinitis    Coronary artery calcification of native artery    Agatston coronary artery calcium score between 200 and 399    Cardiac calcification (HCC)    Sebaceous cyst    Type 2 diabetes mellitus with hyperlipidemia (HCC)      Allergies   Allergen Reactions    Adhesive Tape RASH    Neosporin  [Neomycin-Bacitracin-Polymyxin] RASH     OINT      Past Medical History:    Agatston coronary artery calcium score between 200 and 399    2/24/2022 CT calcium scoring      Asymptomatic varicose veins    Balanitis    Likely candidal secondary to diabetes     Cardiac calcification (HCC)    2/24/2022 CT calcium scoring  FINDINGS:  CALCIUM SCORING RESULTS (Volume / Agatston):   LEFT MAIN:    0.00 / 0.00   RCA:      131.61 / 149.01   LAD:      81.54 / 97.51   CIRCUMFLEX:    14.31 / 9.54   PDA:      0.00 / 0.00    TOTAL:      227.45 / 256.06       Coronary artery calcification of native artery    2/24/2022 CT calcium scoring      Disorder of liver    FATTY LIVER    ED (erectile dysfunction)    Esophageal reflux    Essential hypertension, benign    Gastroesophageal reflux disease with esophagitis    Heartburn    Hx of papillary thyroid carcinoma    Hyperlipidemia LDL goal < 100    Low HDL (under 40)    Mass of subcutaneous tissue    Mixed hyperlipidemia    Other specified visual disturbances    Papillary thyroid carcinoma (HCC)    Dx in 12/2011: Papillary thyroid carcinoma measuring 0.9 cm in the right lobe - T1NxMx - Stage I    Polyp of colon    Postsurgical hypothyroidism    Dx in 12/2011 - total thyroidectomy for thyroid cancer    Sebaceous cyst    Multiple of back      Type 2 diabetes mellitus with hyperglycemia, with long-term current use of insulin (HCC)    Type 2 diabetes mellitus with hyperglycemia, without long-term current use of insulin (HCC)    Type 2 diabetes mellitus with hyperlipidemia (HCC)    Type II or unspecified type diabetes mellitus without mention of complication, not stated as uncontrolled    Vitamin D deficiency    Dx in 2/2013      Past Surgical History:   Procedure Laterality Date    Colonoscopy  03/13/2020    Dr Osei at [facility]    Other surgical history      Surgery for veins in the legs    Thyroidectomy      On 12/19/2011: Total thyroidectomy for thyroid cancer by Dr. Estrada       Social History:  Social History     Socioeconomic History    Marital status:     Number of children: 2   Occupational History    Occupation: Operations Mgmt   Tobacco Use    Smoking status: Never    Smokeless tobacco: Never   Vaping Use    Vaping status: Never Used   Substance and Sexual Activity    Alcohol use: Not Currently     Alcohol/week: 2.0 - 3.0 standard drinks of alcohol     Types: 1 Glasses of wine, 1 - 2 Cans of beer per week     Comment: OCC.    Drug use: No   Other Topics Concern    Caffeine Concern Yes     Comment: 3 cups of coffee per day    Stress Concern No    Weight Concern No    Special Diet No    Exercise Yes     Comment: twice a week    Seat Belt Yes            REVIEW OF SYSTEMS:   Review of Systems   Constitutional:  Negative for chills and fever.   HENT:  Negative for congestion, postnasal drip, rhinorrhea, sneezing, sore throat and trouble swallowing.    Eyes:  Negative for visual disturbance.   Respiratory:  Negative for cough, chest tightness and shortness of breath.    Cardiovascular:  Negative for chest pain and palpitations.   Gastrointestinal:  Negative for abdominal pain, diarrhea, nausea and vomiting.   Genitourinary:  Negative for dysuria and frequency.   Musculoskeletal:  Negative for myalgias.   Skin:  Negative for rash.   Neurological:  Negative for dizziness, weakness, light-headedness and headaches.   Psychiatric/Behavioral:  Negative for sleep disturbance. The patient is not nervous/anxious.    All other systems reviewed and are negative.      EXAM:   /84 (BP Location: Left arm, Patient Position: Sitting, Cuff Size: adult)   Pulse 96   Temp 98 °F (36.7 °C) (Temporal)   Resp 18   Ht 5' 9\" (1.753 m)   Wt 180 lb 3.2 oz (81.7 kg)   SpO2 96%   BMI 26.61 kg/m²  Estimated body mass index is 26.61 kg/m² as calculated from the following:    Height as of this encounter: 5' 9\" (1.753 m).    Weight as of this encounter: 180 lb 3.2 oz (81.7 kg).  Physical Exam  Vitals  and nursing note reviewed.   Constitutional:       General: He is not in acute distress.     Appearance: He is well-developed. He is not ill-appearing.   HENT:      Head: Normocephalic and atraumatic.      Right Ear: Tympanic membrane, ear canal and external ear normal. There is no impacted cerumen.      Left Ear: Tympanic membrane, ear canal and external ear normal. There is no impacted cerumen.      Nose: No rhinorrhea.      Mouth/Throat:      Mouth: Mucous membranes are moist.      Pharynx: No oropharyngeal exudate or posterior oropharyngeal erythema.   Eyes:      General: No scleral icterus.     Extraocular Movements: Extraocular movements intact.      Conjunctiva/sclera: Conjunctivae normal.      Pupils: Pupils are equal, round, and reactive to light.   Neck:      Thyroid: No thyromegaly.   Cardiovascular:      Rate and Rhythm: Normal rate and regular rhythm.      Heart sounds: Normal heart sounds. No murmur heard.  Pulmonary:      Effort: Pulmonary effort is normal. No respiratory distress.      Breath sounds: Normal breath sounds. No wheezing or rhonchi.   Abdominal:      General: Bowel sounds are normal. There is no distension.      Palpations: Abdomen is soft. There is no mass.      Tenderness: There is no abdominal tenderness. There is no guarding or rebound.   Musculoskeletal:      Cervical back: Normal range of motion and neck supple.      Right lower leg: No edema.      Left lower leg: No edema.   Lymphadenopathy:      Cervical: No cervical adenopathy.   Skin:     General: Skin is warm and dry.   Neurological:      Mental Status: He is alert.      Cranial Nerves: No cranial nerve deficit.   Psychiatric:         Behavior: Behavior normal.         Thought Content: Thought content normal.         Judgment: Judgment normal.           DATA:    Component      Latest Ref Rn 6/22/2024   Glucose      65 - 99 mg/dL 145 (H)    BUN      7 - 25 mg/dL 13    CREATININE      0.70 - 1.30 mg/dL 0.80    EGFR      > OR =  60 mL/min/1.73m2 103    BUN/CREATININE RATIO      6 - 22 (calc) SEE NOTE:    Sodium      135 - 146 mmol/L 138    Potassium      3.5 - 5.3 mmol/L 3.9    Chloride      98 - 110 mmol/L 106    Carbon Dioxide, Total      20 - 32 mmol/L 25    CALCIUM      8.6 - 10.3 mg/dL 8.7    PROTEIN, TOTAL      6.1 - 8.1 g/dL 7.0    Albumin      3.6 - 5.1 g/dL 4.0    Globulin      1.9 - 3.7 g/dL (calc) 3.0    A/G Ratio      1.0 - 2.5 (calc) 1.3    Total Bilirubin      0.2 - 1.2 mg/dL 0.4    Alkaline Phosphatase      35 - 144 U/L 74    AST (SGOT)      10 - 35 U/L 15    ALT (SGPT)      9 - 46 U/L 17           Diabetes:    Lab Results   Component Value Date    A1C 7.4 (A) 06/19/2024    A1C 7.0 (A) 12/20/2023    A1C 7.1 (H) 08/12/2023     (H) 02/26/2014     (H) 09/04/2013     (H) 05/31/2013     Lab Results   Component Value Date    CHOLEST 110 06/22/2024    CHOLEST TNP 06/19/2024    CHOLEST 113 08/12/2023     Lab Results   Component Value Date    HDL 40 06/22/2024    HDL TNP 06/19/2024    HDL 39 (L) 08/12/2023     Lab Results   Component Value Date    LDL 51 06/22/2024    LDL TNP 06/19/2024    LDL 51 08/12/2023     Lab Results   Component Value Date    TRIG 102 06/22/2024    TRIG TNP 06/19/2024    TRIG 157 (H) 08/12/2023    TRIGLY 260 12/06/2018     Lab Results   Component Value Date    AST 15 06/22/2024    AST 17 08/12/2023    AST 17 08/12/2023     Lab Results   Component Value Date    ALT 17 06/22/2024    ALT 20 08/12/2023    ALT 20 08/12/2023     Component      Latest Ref Rng 6/22/2024   Glucose      65 - 99 mg/dL 145 (H)    BUN      7 - 25 mg/dL 13    CREATININE      0.70 - 1.30 mg/dL 0.80    EGFR      > OR = 60 mL/min/1.73m2 103    BUN/CREATININE RATIO      6 - 22 (calc) SEE NOTE:    Sodium      135 - 146 mmol/L 138    Potassium      3.5 - 5.3 mmol/L 3.9    Chloride      98 - 110 mmol/L 106    Carbon Dioxide, Total      20 - 32 mmol/L 25    CALCIUM      8.6 - 10.3 mg/dL 8.7    PROTEIN, TOTAL      6.1 - 8.1 g/dL  7.0    Albumin      3.6 - 5.1 g/dL 4.0    Globulin      1.9 - 3.7 g/dL (calc) 3.0    A/G Ratio      1.0 - 2.5 (calc) 1.3    Total Bilirubin      0.2 - 1.2 mg/dL 0.4    Alkaline Phosphatase      35 - 144 U/L 74    AST (SGOT)      10 - 35 U/L 15    ALT (SGPT)      9 - 46 U/L 17         EKG with interpretation and Report -IN OFFICE [[id number]]  Status: Final result    Dx: Preop examination; Sebaceous cyst; Ty...          Component  Ref Range & Units 6 d ago   Ventricular rate  BPM 81   Atrial rate  BPM 81   P-R Interval  ms 156   QRS Duration  ms 78   Q-T Interval  ms 372   QTC Calculation (Bezet)  ms 432   P Axis  degrees 42   R Axis  degrees 26   T Axis  degrees 62   Resulting Agency [facility]              Narrative  Performed by: [facility]  Normal sinus rhythm  Normal ECG  No previous ECGs found in [facility]  Confirmed by Mela Edmondson ([id number]) on 7/8/2024 7:48:53 PM          ASSESSMENT AND PLAN:     Encounter Diagnoses   Name Primary?    Preop examination Yes    Sebaceous cyst     Type 2 diabetes mellitus with hyperglycemia, with long-term current use of insulin (HCC)     Type 2 diabetes mellitus with hyperlipidemia (HCC)     Cardiac calcification (HCC)     Agatston coronary artery calcium score between 200 and 399     Coronary artery calcification of native artery     Mixed hyperlipidemia        This is a 58-year-old male with uncontrolled type 2 diabetes and asymptomatic coronary artery disease who presents for preop history and physical with medical risk assessment as requested by his surgeon Dr. Aguilar prior to excision of cysts of his back under MAC anesthesia to be performed at [facility] scheduled for 7/15/2024.    History reviewed and physical exam performed:  Labs: Acceptable for surgery.  ECG: Normal.      This patient may proceed with excision of cyst of his back under MAC anesthesia, patient is low risk for this procedure.          1. Preop examination  2. Sebaceous cyst  Patient to proceed with surgery.    - EKG with  interpretation and Report -IN OFFICE [[id number]]    3. Type 2 diabetes mellitus with hyperglycemia, with long-term current use of insulin (HCC)  4. Type 2 diabetes mellitus with hyperlipidemia (HCC)  Diabetes is uncontrolled.  Patient to follow-up at the [place] diabetes clinic as planned.    - EKG with interpretation and Report -IN OFFICE [[id number]]      5. Cardiac calcification (HCC)  6. Agatston coronary artery calcium score between 200 and 399  7. Coronary artery calcification of native artery  8. Mixed hyperlipidemia  Patient is asymptomatic.  Recommend continue rosuvastatin 20 mg daily.            Imaging & Consults:  ELECTROCARDIOGRAM, COMPLETE    Return in about 2 months (around 9/8/2024) for Annual Wellness Visit and as needed or indicated.

## 2024-07-08 NOTE — TELEPHONE ENCOUNTER
Call with patient on labs.  He is interested in checking OOP cost for pump.  Can we send orders to [employer] for verbal consent to run benefits or does patient need to sign AOB?  Can prep tomorrow if okay to send orders.

## 2024-07-08 NOTE — TELEPHONE ENCOUNTER
Future Appointments   Date Time Provider Department Center   7/8/2024  5:30 PM Mela Edmondson,  [facility] [place]

## 2024-07-10 ENCOUNTER — PATIENT MESSAGE (OUTPATIENT)
Dept: ENDOCRINOLOGY CLINIC | Facility: CLINIC | Age: 59
End: 2024-07-10

## 2024-07-10 NOTE — TELEPHONE ENCOUNTER
Started pt information for and statement of medical necessity as well attached OV notes and Last A1c value was 7.4% done 6/19/2024.  Along with face sheet/ insurance faxed too [employer] [phone] confirmed     Sent to scan

## 2024-07-14 PROBLEM — E78.5 TYPE 2 DIABETES MELLITUS WITH HYPERLIPIDEMIA (HCC): Status: ACTIVE | Noted: 2024-07-14

## 2024-07-14 PROBLEM — E11.69 TYPE 2 DIABETES MELLITUS WITH HYPERLIPIDEMIA (HCC): Status: ACTIVE | Noted: 2024-07-14

## 2024-07-15 ENCOUNTER — ANESTHESIA (OUTPATIENT)
Dept: SURGERY | Facility: HOSPITAL | Age: 59
End: 2024-07-15
Payer: COMMERCIAL

## 2024-07-15 ENCOUNTER — ANESTHESIA EVENT (OUTPATIENT)
Dept: SURGERY | Facility: HOSPITAL | Age: 59
End: 2024-07-15
Payer: COMMERCIAL

## 2024-07-15 ENCOUNTER — HOSPITAL ENCOUNTER (OUTPATIENT)
Facility: HOSPITAL | Age: 59
Setting detail: HOSPITAL OUTPATIENT SURGERY
Discharge: HOME | End: 2024-07-15
Attending: SURGERY | Admitting: SURGERY
Payer: COMMERCIAL

## 2024-07-15 VITALS
HEIGHT: 69 IN | WEIGHT: 179 LBS | HEART RATE: 93 BPM | RESPIRATION RATE: 12 BRPM | OXYGEN SATURATION: 97 % | TEMPERATURE: 98 F | BODY MASS INDEX: 26.51 KG/M2 | DIASTOLIC BLOOD PRESSURE: 84 MMHG | SYSTOLIC BLOOD PRESSURE: 134 MMHG

## 2024-07-15 DIAGNOSIS — L72.3 SEBACEOUS CYST: Primary | ICD-10-CM

## 2024-07-15 LAB
GLUCOSE BLD-MCNC: 152 MG/DL (ref 70–99)
GLUCOSE BLD-MCNC: 179 MG/DL (ref 70–99)

## 2024-07-15 PROCEDURE — 88304 TISSUE EXAM BY PATHOLOGIST: CPT | Performed by: SURGERY

## 2024-07-15 PROCEDURE — 0HB6XZZ EXCISION OF BACK SKIN, EXTERNAL APPROACH: ICD-10-PCS | Performed by: SURGERY

## 2024-07-15 PROCEDURE — 82962 GLUCOSE BLOOD TEST: CPT

## 2024-07-15 RX ORDER — HYDROCODONE BITARTRATE AND ACETAMINOPHEN 5; 325 MG/1; MG/1
1 TABLET ORAL ONCE AS NEEDED
Status: DISCONTINUED | OUTPATIENT
Start: 2024-07-15 | End: 2024-07-15

## 2024-07-15 RX ORDER — ONDANSETRON 2 MG/ML
4 INJECTION INTRAMUSCULAR; INTRAVENOUS EVERY 6 HOURS PRN
Status: DISCONTINUED | OUTPATIENT
Start: 2024-07-15 | End: 2024-07-15

## 2024-07-15 RX ORDER — HYDROMORPHONE HYDROCHLORIDE 1 MG/ML
0.4 INJECTION, SOLUTION INTRAMUSCULAR; INTRAVENOUS; SUBCUTANEOUS EVERY 5 MIN PRN
Status: DISCONTINUED | OUTPATIENT
Start: 2024-07-15 | End: 2024-07-15

## 2024-07-15 RX ORDER — PROCHLORPERAZINE EDISYLATE 5 MG/ML
5 INJECTION INTRAMUSCULAR; INTRAVENOUS EVERY 8 HOURS PRN
Status: DISCONTINUED | OUTPATIENT
Start: 2024-07-15 | End: 2024-07-15

## 2024-07-15 RX ORDER — DEXAMETHASONE SODIUM PHOSPHATE 4 MG/ML
VIAL (ML) INJECTION AS NEEDED
Status: DISCONTINUED | OUTPATIENT
Start: 2024-07-15 | End: 2024-07-15 | Stop reason: SURG

## 2024-07-15 RX ORDER — ONDANSETRON 2 MG/ML
INJECTION INTRAMUSCULAR; INTRAVENOUS AS NEEDED
Status: DISCONTINUED | OUTPATIENT
Start: 2024-07-15 | End: 2024-07-15 | Stop reason: SURG

## 2024-07-15 RX ORDER — DEXTROSE MONOHYDRATE 25 G/50ML
50 INJECTION, SOLUTION INTRAVENOUS
Status: DISCONTINUED | OUTPATIENT
Start: 2024-07-15 | End: 2024-07-15 | Stop reason: HOSPADM

## 2024-07-15 RX ORDER — NICOTINE POLACRILEX 4 MG
15 LOZENGE BUCCAL
Status: DISCONTINUED | OUTPATIENT
Start: 2024-07-15 | End: 2024-07-15 | Stop reason: HOSPADM

## 2024-07-15 RX ORDER — SODIUM CHLORIDE, SODIUM LACTATE, POTASSIUM CHLORIDE, CALCIUM CHLORIDE 600; 310; 30; 20 MG/100ML; MG/100ML; MG/100ML; MG/100ML
INJECTION, SOLUTION INTRAVENOUS CONTINUOUS
Status: DISCONTINUED | OUTPATIENT
Start: 2024-07-15 | End: 2024-07-15

## 2024-07-15 RX ORDER — HYDROMORPHONE HYDROCHLORIDE 1 MG/ML
0.6 INJECTION, SOLUTION INTRAMUSCULAR; INTRAVENOUS; SUBCUTANEOUS EVERY 5 MIN PRN
Status: DISCONTINUED | OUTPATIENT
Start: 2024-07-15 | End: 2024-07-15

## 2024-07-15 RX ORDER — HYDROCODONE BITARTRATE AND ACETAMINOPHEN 5; 325 MG/1; MG/1
2 TABLET ORAL ONCE AS NEEDED
Status: DISCONTINUED | OUTPATIENT
Start: 2024-07-15 | End: 2024-07-15

## 2024-07-15 RX ORDER — KETOROLAC TROMETHAMINE 30 MG/ML
INJECTION, SOLUTION INTRAMUSCULAR; INTRAVENOUS AS NEEDED
Status: DISCONTINUED | OUTPATIENT
Start: 2024-07-15 | End: 2024-07-15 | Stop reason: SURG

## 2024-07-15 RX ORDER — HYDROMORPHONE HYDROCHLORIDE 1 MG/ML
0.2 INJECTION, SOLUTION INTRAMUSCULAR; INTRAVENOUS; SUBCUTANEOUS EVERY 5 MIN PRN
Status: DISCONTINUED | OUTPATIENT
Start: 2024-07-15 | End: 2024-07-15

## 2024-07-15 RX ORDER — BUPIVACAINE HYDROCHLORIDE AND EPINEPHRINE 5; 5 MG/ML; UG/ML
INJECTION, SOLUTION EPIDURAL; INTRACAUDAL; PERINEURAL AS NEEDED
Status: DISCONTINUED | OUTPATIENT
Start: 2024-07-15 | End: 2024-07-15 | Stop reason: HOSPADM

## 2024-07-15 RX ORDER — HEPARIN SODIUM 5000 [USP'U]/ML
5000 INJECTION, SOLUTION INTRAVENOUS; SUBCUTANEOUS ONCE
Status: COMPLETED | OUTPATIENT
Start: 2024-07-15 | End: 2024-07-15

## 2024-07-15 RX ORDER — NALOXONE HYDROCHLORIDE 0.4 MG/ML
0.08 INJECTION, SOLUTION INTRAMUSCULAR; INTRAVENOUS; SUBCUTANEOUS AS NEEDED
Status: DISCONTINUED | OUTPATIENT
Start: 2024-07-15 | End: 2024-07-15

## 2024-07-15 RX ORDER — ACETAMINOPHEN 500 MG
1000 TABLET ORAL ONCE AS NEEDED
Status: DISCONTINUED | OUTPATIENT
Start: 2024-07-15 | End: 2024-07-15

## 2024-07-15 RX ORDER — ROCURONIUM BROMIDE 10 MG/ML
INJECTION, SOLUTION INTRAVENOUS AS NEEDED
Status: DISCONTINUED | OUTPATIENT
Start: 2024-07-15 | End: 2024-07-15 | Stop reason: SURG

## 2024-07-15 RX ORDER — NICOTINE POLACRILEX 4 MG
30 LOZENGE BUCCAL
Status: DISCONTINUED | OUTPATIENT
Start: 2024-07-15 | End: 2024-07-15 | Stop reason: HOSPADM

## 2024-07-15 RX ORDER — MIDAZOLAM HYDROCHLORIDE 1 MG/ML
INJECTION INTRAMUSCULAR; INTRAVENOUS AS NEEDED
Status: DISCONTINUED | OUTPATIENT
Start: 2024-07-15 | End: 2024-07-15 | Stop reason: SURG

## 2024-07-15 RX ORDER — IBUPROFEN 600 MG/1
600 TABLET ORAL ONCE AS NEEDED
Status: DISCONTINUED | OUTPATIENT
Start: 2024-07-15 | End: 2024-07-15

## 2024-07-15 RX ORDER — ACETAMINOPHEN 500 MG
500 TABLET ORAL EVERY 6 HOURS PRN
COMMUNITY

## 2024-07-15 RX ORDER — LIDOCAINE HYDROCHLORIDE 10 MG/ML
INJECTION, SOLUTION EPIDURAL; INFILTRATION; INTRACAUDAL; PERINEURAL AS NEEDED
Status: DISCONTINUED | OUTPATIENT
Start: 2024-07-15 | End: 2024-07-15 | Stop reason: SURG

## 2024-07-15 RX ORDER — EPHEDRINE SULFATE 50 MG/ML
INJECTION INTRAVENOUS AS NEEDED
Status: DISCONTINUED | OUTPATIENT
Start: 2024-07-15 | End: 2024-07-15 | Stop reason: SURG

## 2024-07-15 RX ADMIN — ROCURONIUM BROMIDE 10 MG: 10 INJECTION, SOLUTION INTRAVENOUS at 10:55:00

## 2024-07-15 RX ADMIN — ROCURONIUM BROMIDE 20 MG: 10 INJECTION, SOLUTION INTRAVENOUS at 11:02:00

## 2024-07-15 RX ADMIN — EPHEDRINE SULFATE 10 MG: 50 INJECTION INTRAVENOUS at 11:35:00

## 2024-07-15 RX ADMIN — LIDOCAINE HYDROCHLORIDE 25 MG: 10 INJECTION, SOLUTION EPIDURAL; INFILTRATION; INTRACAUDAL; PERINEURAL at 10:52:00

## 2024-07-15 RX ADMIN — MIDAZOLAM HYDROCHLORIDE 2 MG: 1 INJECTION INTRAMUSCULAR; INTRAVENOUS at 10:53:00

## 2024-07-15 RX ADMIN — ONDANSETRON 4 MG: 2 INJECTION INTRAMUSCULAR; INTRAVENOUS at 11:03:00

## 2024-07-15 RX ADMIN — EPHEDRINE SULFATE 10 MG: 50 INJECTION INTRAVENOUS at 11:40:00

## 2024-07-15 RX ADMIN — KETOROLAC TROMETHAMINE 30 MG: 30 INJECTION, SOLUTION INTRAMUSCULAR; INTRAVENOUS at 11:31:00

## 2024-07-15 RX ADMIN — DEXAMETHASONE SODIUM PHOSPHATE 4 MG: 4 MG/ML VIAL (ML) INJECTION at 11:02:00

## 2024-07-15 RX ADMIN — SODIUM CHLORIDE, SODIUM LACTATE, POTASSIUM CHLORIDE, CALCIUM CHLORIDE: 600; 310; 30; 20 INJECTION, SOLUTION INTRAVENOUS at 11:08:00

## 2024-07-15 RX ADMIN — SODIUM CHLORIDE, SODIUM LACTATE, POTASSIUM CHLORIDE, CALCIUM CHLORIDE: 600; 310; 30; 20 INJECTION, SOLUTION INTRAVENOUS at 11:54:00

## 2024-07-15 NOTE — OPERATIVE REPORT
[facility]    PATIENT'S NAME: ROMA LUX   ATTENDING PHYSICIAN: Carlos Manuel Aguilar D.O.   OPERATING PHYSICIAN: Carlos Manuel Aguilar D.O.   PATIENT ACCOUNT#:   [id number]    LOCATION:  [facility] [address] [facility]  MEDICAL RECORD #:   [id number]       YOB: 1965  ADMISSION DATE:       07/15/2024      OPERATION DATE:  07/15/2024    OPERATIVE REPORT      PREOPERATIVE DIAGNOSIS:  Multiple soft tissue masses of back.  POSTOPERATIVE DIAGNOSIS:  Multiple soft tissue masses of back.  PROCEDURE:    1.   Excision of 2.8 cm soft tissue mass, midline of back, midline caudal, with intermediate layered closure 4.3 cm in length.  2.   Excision of 8 mm soft tissue mass, right shoulder, cephalad, with intermediate layered closure 1.8 cm in length.  3.   Excision of 2.9 cm soft tissue mass, with intermediate layered closure 4.0 cm in length.    ANESTHESIA:  General.    COMPLICATIONS:  None.    OPERATIVE TECHNIQUE:  An informed consent was previously obtained.  The patient was taken to the operative suite and placed in a supine position.  General inhalational anesthetic was administered by the anesthesia department, and patient was placed in prone position.    The upper back was prepped and draped in usual sterile fashion.  Attention was initially directed towards the midline of the upper back where a bi-elliptical skin incision was made using a #15 scalpel blade.  Sharp dissection carried down through skin and subcutaneous tissue, and underlying soft tissue mass was circumferentially sharply excised and handed off for pathological evaluation.  Hemostasis was secured using handheld electrocautery.  Irrigation was carried out within the wound.  The subcutaneous layer was approximated using interrupted 3-0 Vicryl suture, the skin edges approximated using 4-0 Monocryl in a running subdermal fashion.  Overlying Mastisol and Steri-Strips were applied.    Identical procedure was carried out at the right shoulder,  cephalad, and right shoulder, caudal.  The patient was extubated and transported to recovery room in stable condition.     Dictated By Carlos Manuel Aguilar D.O.  d: 07/15/2024 11:58:43  t: 07/15/2024 18:51:12  Job [id number]  /

## 2024-07-15 NOTE — H&P
istory of Present Illness   The patient is a 58-year-old gentleman seen at the request of his primary care physician regarding sebaceous cysts.  The patient states over his right shoulder blade, he had pain and swelling.  He went to the emergency room where he underwent incision and drainage.  A year and a half later, last July, the swelling returned.  This time, it was drained by nurse practitioner.  The patient states he has a similar type mass growing in the middle of his back as well as one on his right shoulder.  The one on the right shoulder is more of a \"pimple.\"  The larger 1 in the 1 he needed drained in the past both started as pimples.  He denies fevers and chills.  He denies active drainage from the sites.     PCP:  DO Son Riley  Hugo is allergic to adhesive tape and neosporin [neomycin-bacitracin-polymyxin].     Past Medical / Surgical / Social / Family History    The past medical and past surgical history have been reviewed by me today.     Past Medical History        Past Medical History:   Diagnosis Date    Balanitis 09/29/2015     Likely candidal secondary to diabetes     ED (erectile dysfunction) 02/27/2013    Esophageal reflux      Essential hypertension, benign 11/01/2014    Heartburn 3months    High cholesterol 01/2015    History of thyroid cancer 05/25/2013    Hyperlipidemia LDL goal < 100 11/30/2012    Low HDL (under 40) 11/01/2014    Mass of subcutaneous tissue 01/07/2016    Other specified visual disturbances 06/27/2011    Papillary thyroid carcinoma (HCC)       Dx in 12/2011: Papillary thyroid carcinoma measuring 0.9 cm in the right lobe - [id number] - Stage I    Postsurgical hypothyroidism       Dx in 12/2011 - total thyroidectomy for thyroid cancer    Type 2 diabetes mellitus with hyperglycemia, without long-term current use of insulin (HCC) 09/27/2016    Type II or unspecified type diabetes mellitus without mention of complication, not stated as uncontrolled  06/29/2012    Vitamin D deficiency       Dx in 2/2013         Past Surgical History         Past Surgical History:   Procedure Laterality Date    COLONOSCOPY   03/13/2020     Dr Osei at [facility]    OTHER SURGICAL HISTORY         Surgery for veins in the legs    THYROIDECTOMY         On 12/19/2011: Total thyroidectomy for thyroid cancer by Dr. Estrada            The family history and social history have been reviewed by me today.     Family History         Family History   Problem Relation Age of Onset    Heart Disease Maternal Aunt      Cancer Maternal Uncle      Stroke Neg      Thyroid Disorder Neg      Diabetes Neg           Social Hx on file   Social History            Socioeconomic History    Marital status:     Number of children: 2   Occupational History    Occupation: Operations Mgmt   Tobacco Use    Smoking status: Never    Smokeless tobacco: Never   Vaping Use    Vaping Use: Never used   Substance and Sexual Activity    Alcohol use: Yes       Alcohol/week: 2.0 - 3.0 standard drinks of alcohol       Types: 1 Glasses of wine, 1 - 2 Cans of beer per week       Comment: Family events  only    Drug use: No   Other Topics Concern    Caffeine Concern Yes       Comment: 3 cups of coffee per day    Exercise Yes       Comment: twice a week    Seat Belt Yes           Medications - Current      Current Outpatient Medications:     rosuvastatin 20 MG Oral Tab, Take 1 tablet (20 mg total) by mouth daily., Disp: 90 tablet, Rfl: 2    Benazepril HCl 5 MG Oral Tab, Take 1 tablet (5 mg total) by mouth daily., Disp: 90 tablet, Rfl: 2    pantoprazole 40 MG Oral Tab EC, Take one tablet (40 mg total) by mouth once daily, 30 minutes prior to breakfast., Disp: 90 tablet, Rfl: 0    semaglutide (OZEMPIC, 1 MG/DOSE,) 4 MG/3ML Subcutaneous Solution Pen-injector, Inject 1 mg into the skin once a week., Disp: 9 mL, Rfl: 0    Insulin Lispro-aabc, 1 U Dial, (LYUMJEV KWIKPEN) 100 UNIT/ML Subcutaneous Solution Pen-injector,  Uses up to 12 units daily as directed in divided doses based on BG readings, Disp: 6 mL, Rfl: 0    Insulin Glargine, 1 Unit Dial, (TOUJEO SOLOSTAR) 300 UNIT/ML Subcutaneous Solution Pen-injector, USES up to 30  UNITS DAILY AS DIRECTED (Patient taking differently: USES up to 28 UNITS DAILY AS DIRECTED), Disp: 4.5 mL, Rfl: 3    Continuous Blood Gluc Sensor (FREESTYLE LIO 2 SENSOR) Does not apply Misc, 1 Device every 14 (fourteen) days., Disp: 2 each, Rfl: 11    metFORMIN HCl 1000 MG Oral Tab, Take 1 tablet (1,000 mg total) by mouth 2 (two) times daily with meals., Disp: 180 tablet, Rfl: 1    Insulin Pen Needle (BD PEN NEEDLE GARCIA 2ND GEN) 32G X 4 MM Does not apply Misc, 1 strip by In Vitro route daily., Disp: 100 each, Rfl: 3    JARDIANCE 25 MG Oral Tab, Take 25 mg by mouth every morning., Disp: 90 tablet, Rfl: 1    MONTELUKAST 10 MG Oral Tab, TAKE 1 TABLET BY MOUTH EVERY DAY AT NIGHT, Disp: 30 tablet, Rfl: 2    Barberry-Oreg Grape-Goldenseal (BERBERINE COMPLEX OR), Take by mouth., Disp: , Rfl:     ASHWAGANDHA OR, Take 1,000 mg by mouth daily., Disp: , Rfl:     Omega-3 1000 MG Oral Cap, Take 1,000 mg by mouth daily. Take 2 dab daily, Disp: , Rfl:     Levothyroxine Sodium 137 MCG Oral Tab, Take 137 mcg by mouth every morning before breakfast., Disp: 90 tablet, Rfl: 3    diphenhydrAMINE HCl 25 MG Oral Tab, Take 1 tablet (25 mg total) by mouth every 6 (six) hours as needed for Itching., Disp: , Rfl:     CINNAMON OR, Take 1 tablet by mouth daily., Disp: , Rfl:     Coenzyme Q10 (CO Q-10) 100 MG Oral Cap, 200 mg. Take 1 tablet daily  , Disp: , Rfl:          Review of Systems  The Review of Systems has been reviewed by me during today.  Review of Systems   Constitutional: Negative.    HENT: Negative.     Eyes: Negative.    Respiratory: Negative.     Cardiovascular: Negative.    Gastrointestinal: Negative.    Genitourinary: Negative.    Musculoskeletal: Negative.    Skin: Negative.    Neurological: Negative.     Psychiatric/Behavioral: Negative.           Physical Findings   Pulse 87   Temp 99.3 °F (37.4 °C) (Temporal)   Physical Exam  Constitutional:       Appearance: He is well-developed.   HENT:      Head: Normocephalic and atraumatic.   Eyes:      General: No scleral icterus.     Conjunctiva/sclera: Conjunctivae normal.   Neck:      Trachea: No tracheal deviation.   Cardiovascular:      Rate and Rhythm: Normal rate and regular rhythm.      Heart sounds: S1 normal and S2 normal. No murmur heard.  Pulmonary:      Effort: Pulmonary effort is normal. No accessory muscle usage or respiratory distress.      Breath sounds: No decreased breath sounds, wheezing, rhonchi or rales.   Chest:      Chest wall: No mass.   Skin:     General: Skin is warm and dry.           Neurological:      Mental Status: He is alert and oriented to person, place, and time.   Psychiatric:         Speech: Speech normal.         Behavior: Behavior normal.         Thought Content: Thought content normal.         Judgment: Judgment normal.            Assessment   1. Sebaceous cyst             Plan   The masses are consistent with sebaceous cysts.  I recommended definitive excision.  The risks, benefits, and alternatives were discussed with the patient in detail.  Risks included, but are not limited to, recurrence of the cysts, wound infection, and bleeding.  The patient is agreeable to proceed.  His surgery will be scheduled under sedation in the coming weeks.

## 2024-07-15 NOTE — DISCHARGE INSTRUCTIONS
Home Care Instructions  Minor Surgery  Dr. Carlos Manuel Aguilar    MEDICATIONS  For post-operative pain control the mediations are usually over the counter preparations such as Advil and Tylenol.  For severe pain the patient may overlap Advil with Tylenol.  The patient can do this by taking two Tylenol, then three hours later taking two Advil, then three hours later taking Tylenol again.  Please ask your surgeon before resuming blood thinners such as aspirin, Plavix or Coumadin.  All other home medications may be resumed as scheduled.  Generally aspirin is avoided for ten days.    DIET  The patient may resume a general diet immediately.  There should be no alcohol consumption in the immediate recover time period.  If the patient was sedated for the procedure the first meal should be light.    WOUND CARE  The top dressing may be removed the day after surgery.  This includes the gauze, tape and band-aids if they are present.  Do not remove the steri-strips or butterfly tapes that are white and adherent to the skin.  The steri-strips will eventually peel up at the ends and at this point they may be removed.  This is usually seven to ten days after surgery.  The patient may shower the day after surgery.  There is no need to cover the incisions, and all top gauze type dressing should be removed prior to showering.  Soap can get on the wounds but do not scrub over the wounds.  No hair dye or chemicals of any kind should get in the wounds.  Avoid tub baths for two weeks.  Most wounds will be closed with dissolving suture underneath the skin.  These sutures will dissolve on their own.  The doctor or nurse will remove any visible sutures, or staples, in the office.    ACTIVITY  The patient may ride in a car but should not drive the car for at least overnight if sedation was used.  If no sedation was used the patient may drive immediately.  The patient may return to work the next day.  Avoid any activity that could lead to the wound  getting elbowed or bumped.  Avoid bending, pushing, pulling and lifting anything heavier than 25 pounds for two weeks.  Patients should seek further activity limits at the time of their appointment.  No extreme sports for two weeks.    APPOINTMENT  Please call our office today for an appointment within five to ten days of discharge Any fever greater than 100.5, chills, nausea, vomiting, or severe diarrhea please call our office.  If the wound turns red, hot, swollen, becomes increasingly painful, or drains pus call us immediately at [phone] or [phone].  For life threatening emergencies call [phone].  For non-emergent care please call our office after 8:30 a.m. Monday through Friday.  The number listed above is our office number.  Our phone automatically switches to out answering service if we are not there.  Please do not use the emergency room for non-urgent care.    Thank you for entrusting us with your care.  [facility]--General Surgery    You received a drug called Toradol which is an Anti Inflammatory at: 1150    If you are allowed to take Anti inflammatories:    Do not take any Anti Inflammatory like Motrin, Aleve or Ibuprophen until after: 550 pm  Please report any suspected allergic reactions or bleeding issues to your doctor

## 2024-07-15 NOTE — ANESTHESIA PROCEDURE NOTES
Airway  Date/Time: 7/15/2024 10:58 AM  Urgency: elective      General Information and Staff    Patient location during procedure: OR  Anesthesiologist: Price Pham MD  Performed: anesthesiologist   Performed by: Price Pham MD  Authorized by: Price Pham MD      Indications and Patient Condition  Indications for airway management: anesthesia  Sedation level: deep  Preoxygenated: yes  Patient position: sniffing  Mask difficulty assessment: 1 - vent by mask    Final Airway Details  Final airway type: endotracheal airway      Successful airway: ETT  Cuffed: yes   Successful intubation technique: direct laryngoscopy  Endotracheal tube insertion site: oral  Blade: Yancy  Blade size: #4  ETT size (mm): 7.0    Placement verified by: capnometry   Measured from: lips  Number of attempts at approach: 1

## 2024-07-15 NOTE — BRIEF OP NOTE
Pre-Operative Diagnosis: Sebaceous cyst [L72.3]     Post-Operative Diagnosis: Sebaceous cyst [L72.3]      Procedure Performed:   EXCISION OF BACK CYSTS TIMES THREE    Surgeons and Role:     * Carlos Manuel Aguilar DO - Primary    Assistant(s):        Surgical Findings:      Specimen:      Estimated Blood Loss: Blood Output: 3 mL (7/15/2024 11:42 AM)      Dictation Number:      Carlos Manuel Aguilar DO  7/15/2024  11:47 AM

## 2024-07-15 NOTE — ANESTHESIA POSTPROCEDURE EVALUATION
[facility]    Hugo Branham Patient Status:  Hospital Outpatient Surgery   Age/Gender 58 year old male MRN [id number]   Location [facility] SURGERY Attending Carlos Manuel Aguilar DO   Hosp Day # 0 PCP Mela Edmondson DO       Anesthesia Post-op Note    EXCISION OF BACK CYSTS TIMES THREE    Procedure Summary       Date: 07/15/24 Room / Location:  MAIN OR 10 / [facility] MAIN OR    Anesthesia Start: 1052 Anesthesia Stop:     Procedure: EXCISION OF BACK CYSTS TIMES THREE (Back) Diagnosis:       Sebaceous cyst      (Sebaceous cyst [L72.3])    Surgeons: Carlos Manuel Aguilar DO Anesthesiologist: Price Pham MD    Anesthesia Type: general ASA Status: 3            Anesthesia Type: general    Vitals Value Taken Time   /96 07/15/24 1154   Temp 97.1 07/15/24 1154   Pulse 88 07/15/24 1154   Resp 20 07/15/24 1154   SpO2 96 07/15/24 1154           Anesthesia Type: general    Airway Patency: patent    Postop Pain Control: adequate    Mental Status: mildly sedated but able to meaningfully participate in the post-anesthesia evaluation    Nausea/Vomiting: none    Cardiopulmonary/Hydration status: stable euvolemic    Complications: no apparent anesthesia related complications    Postop vital signs: stable    Dental Exam: Unchanged from Preop    Patient to be discharged from PACU when criteria met.

## 2024-07-15 NOTE — ANESTHESIA PREPROCEDURE EVALUATION
PRE-OP EVALUATION    Patient Name: Hugo Branham    Admit Diagnosis: Sebaceous cyst [L72.3]    Pre-op Diagnosis: Sebaceous cyst [L72.3]    EXCISION OF BACK CYSTS TIMES THREE    Anesthesia Procedure: EXCISION OF BACK CYSTS TIMES THREE    Surgeons and Role:     * Carlos Manuel Aguilar, DO - Primary    Pre-op vitals reviewed.        Body mass index is 26.58 kg/m².    Current medications reviewed.  Hospital Medications:   glucose (Dex4) 15 GM/59ML oral liquid 15 g  15 g Oral Q15 Min PRN    Or    glucose (Glutose) 40% oral gel 15 g  15 g Oral Q15 Min PRN    Or    glucose-vitamin C (Dex-4) chewable tab 4 tablet  4 tablet Oral Q15 Min PRN    Or    dextrose 50% injection 50 mL  50 mL Intravenous Q15 Min PRN    Or    glucose (Dex4) 15 GM/59ML oral liquid 30 g  30 g Oral Q15 Min PRN    Or    glucose (Glutose) 40% oral gel 30 g  30 g Oral Q15 Min PRN    Or    glucose-vitamin C (Dex-4) chewable tab 8 tablet  8 tablet Oral Q15 Min PRN    lactated ringers infusion   Intravenous Continuous    heparin (Porcine) 5000 UNIT/ML injection 5,000 Units  5,000 Units Subcutaneous Once    ceFAZolin (Ancef) 2g in 10mL IV syringe premix  2 g Intravenous Once       Outpatient Medications:     Medications Prior to Admission   Medication Sig Dispense Refill Last Dose    semaglutide (OZEMPIC, 1 MG/DOSE,) 4 MG/3ML Subcutaneous Solution Pen-injector Inject 1 mg into the skin once a week. 9 mL 0     Insulin Lispro-aabc, 1 U Dial, (LYUMJEV KWIKPEN) 100 UNIT/ML Subcutaneous Solution Pen-injector Uses up to 12 units daily as directed in divided doses based on BG readings 15 mL 0     Insulin Glargine, 1 Unit Dial, (TOUJEO SOLOSTAR) 300 UNIT/ML Subcutaneous Solution Pen-injector USES up to 32  UNITS DAILY AS DIRECTED 4.5 mL 3     JARDIANCE 25 MG Oral Tab Take 25 mg by mouth every morning. 90 tablet 1     metFORMIN HCl 1000 MG Oral Tab Take 1 tablet (1,000 mg total) by mouth 2 (two) times daily with meals. 180 tablet 1     rosuvastatin 20 MG Oral Tab Take 1  tablet (20 mg total) by mouth daily. 90 tablet 2     Benazepril HCl 5 MG Oral Tab Take 1 tablet (5 mg total) by mouth daily. 90 tablet 2     Barberry-Oreg Grape-Goldenseal (BERBERINE COMPLEX OR) Take by mouth.       ASHWAGANDHA OR Take 1,000 mg by mouth daily.       Omega-3 1000 MG Oral Cap Take 1,000 mg by mouth daily. Take 2 dab daily       Levothyroxine Sodium 137 MCG Oral Tab Take 137 mcg by mouth every morning before breakfast. 90 tablet 3     diphenhydrAMINE HCl 25 MG Oral Tab Take 1 tablet (25 mg total) by mouth every 6 (six) hours as needed for Itching. (Patient not taking: Reported on 7/8/2024)       CINNAMON OR Take 1 tablet by mouth daily.       Coenzyme Q10 (CO Q-10) 100 MG Oral Cap 200 mg. Take 1 tablet daily          pantoprazole 40 MG Oral Tab EC TAKE 1 TABLET BY MOUTH EVERY DAY 30 MINUTES BEFORE BREAKFAST 90 tablet 0     Continuous Glucose Sensor (FREESTYLE LIO 2 SENSOR) Does not apply Misc 1 each every 14 (fourteen) days. 2 each 11     Insulin Pen Needle (BD PEN NEEDLE GARCIA 2ND GEN) 32G X 4 MM Does not apply Misc Up to 4 injections daily 300 each 3        Allergies: Adhesive tape and Neosporin [neomycin-bacitracin-polymyxin]      Anesthesia Evaluation    Patient summary reviewed.    Anesthetic Complications           GI/Hepatic/Renal      (+) GERD          (+) liver disease                 Cardiovascular      ECG reviewed.            (+) hypertension     (+) CAD                                Endo/Other      (+) diabetes                            Pulmonary    Negative pulmonary ROS.                       Neuro/Psych    Negative neuro/psych ROS.                                  Past Surgical History:   Procedure Laterality Date    Colonoscopy  03/13/2020    Dr Osei at [facility] 10yr plan    Other surgical history      Surgery for veins in the legs    Thyroidectomy      On 12/19/2011: Total thyroidectomy for thyroid cancer by Dr. Estrada     Social History     Socioeconomic History    Marital status:      Number of children: 2   Occupational History    Occupation: Operations Mgmt   Tobacco Use    Smoking status: Never    Smokeless tobacco: Never   Vaping Use    Vaping status: Never Used   Substance and Sexual Activity    Alcohol use: Not Currently     Alcohol/week: 2.0 - 3.0 standard drinks of alcohol     Types: 1 Glasses of wine, 1 - 2 Cans of beer per week     Comment: OCC.    Drug use: No   Other Topics Concern    Caffeine Concern Yes     Comment: 3 cups of coffee per day    Stress Concern No    Weight Concern No    Special Diet No    Exercise Yes     Comment: twice a week    Seat Belt Yes     History   Drug Use No     Available pre-op labs reviewed.     Lab Results   Component Value Date     06/22/2024    K 3.9 06/22/2024     06/22/2024    CO2 25 06/22/2024    BUN 13 06/22/2024    CREATSERUM 0.80 06/22/2024     (H) 06/22/2024    CA 8.7 06/22/2024            Airway      Mallampati: II       Cardiovascular    Cardiovascular exam normal.         Dental    Dentition appears grossly intact         Pulmonary    Pulmonary exam normal.                 Other findings              ASA: 3   Plan: general  NPO status verified and           Plan/risks discussed with: patient                Present on Admission:  **None**

## 2024-07-29 NOTE — TELEPHONE ENCOUNTER
Left voicemail for Nena, requesting update.  Per Nena, patient is still thinking over cost and payment plan.

## 2024-08-05 ENCOUNTER — OFFICE VISIT (OUTPATIENT)
Facility: LOCATION | Age: 59
End: 2024-08-05
Payer: COMMERCIAL

## 2024-08-05 VITALS — HEART RATE: 94 BPM | TEMPERATURE: 97 F

## 2024-08-05 DIAGNOSIS — Z98.890 POSTOPERATIVE STATE: Primary | ICD-10-CM

## 2024-08-05 DIAGNOSIS — L72.3 SEBACEOUS CYST: ICD-10-CM

## 2024-08-05 PROCEDURE — 99024 POSTOP FOLLOW-UP VISIT: CPT

## 2024-08-05 NOTE — PROGRESS NOTES
Follow Up Visit Note       Active Problems      1. Postoperative state    2. Sebaceous cyst          Chief Complaint   Chief Complaint   Patient presents with    Post-Op     PO 7/15 EXCISION OF BACK CYSTS TIMES THREE w/Jeff          History of Present Illness  Hugo is a 58 year old male who is status post excision of back cysts x 3 with Dr. Aguilar on 7/15/24. He presents to the office for a routine post operative check.    He states he is doing well post operatively. He admits some incisional soreness post operatively which has now resolved. He denies taking any prescription or over the counter pain medications. He denies drainage from incision sites. He admits some itchiness surrounding incision sites. He denies fever or chills post operatively. He denies nausea, vomiting, diarrhea or constipation.    Specimen pathology as below:  A. Back midline caudal\"  -Benign epidermal inclusion cyst.     B. Sebaceous cyst, right caudal\"  -Benign epidermal inclusion cyst.     C. Right upper shoulder, cephalad\"  -Benign epidermal inclusion cyst.      Allergies  Hugo is allergic to adhesive tape and neosporin [neomycin-bacitracin-polymyxin].    Past Medical / Surgical / Social / Family History    The past medical and past surgical history have been reviewed by me today.    Past Medical History:    Agatston coronary artery calcium score between 200 and 399    2/24/2022 CT calcium scoring      Asymptomatic varicose veins    Balanitis    Likely candidal secondary to diabetes     Cardiac calcification (HCC)    2/24/2022 CT calcium scoring  FINDINGS:  CALCIUM SCORING RESULTS (Volume / Agatston):   LEFT MAIN:    0.00 / 0.00   RCA:      131.61 / 149.01   LAD:      81.54 / 97.51   CIRCUMFLEX:    14.31 / 9.54   PDA:      0.00 / 0.00    TOTAL:      227.45 / 256.06       Coronary artery calcification of native artery    2/24/2022 CT calcium scoring      Disorder of liver    FATTY LIVER    ED (erectile dysfunction)    Esophageal reflux     Essential hypertension, benign    Gastroesophageal reflux disease with esophagitis    Heartburn    Hx of papillary thyroid carcinoma    Hyperlipidemia LDL goal < 100    Low HDL (under 40)    Mass of subcutaneous tissue    Mixed hyperlipidemia    Other specified visual disturbances    Papillary thyroid carcinoma (HCC)    Dx in 12/2011: Papillary thyroid carcinoma measuring 0.9 cm in the right lobe - T1NxMx - Stage I    Polyp of colon    Postsurgical hypothyroidism    Dx in 12/2011 - total thyroidectomy for thyroid cancer    Sebaceous cyst    Multiple of back      Type 2 diabetes mellitus with hyperglycemia, with long-term current use of insulin (HCC)    Type 2 diabetes mellitus with hyperglycemia, without long-term current use of insulin (HCC)    Type 2 diabetes mellitus with hyperlipidemia (HCC)    Type II or unspecified type diabetes mellitus without mention of complication, not stated as uncontrolled    Vitamin D deficiency    Dx in 2/2013     Past Surgical History:   Procedure Laterality Date    Colonoscopy  03/13/2020    Dr Osei at [facility]    Other surgical history      Surgery for veins in the legs    Thyroidectomy      On 12/19/2011: Total thyroidectomy for thyroid cancer by Dr. Estrada       The family history and social history have been reviewed by me today.    Family History   Problem Relation Age of Onset    Heart Disease Maternal Aunt     Cancer Maternal Uncle     Stroke Neg     Thyroid Disorder Neg     Diabetes Neg      Social History     Socioeconomic History    Marital status:     Number of children: 2   Occupational History    Occupation: Operations Mgmt   Tobacco Use    Smoking status: Never    Smokeless tobacco: Never   Vaping Use    Vaping status: Never Used   Substance and Sexual Activity    Alcohol use: Not Currently     Alcohol/week: 2.0 - 3.0 standard drinks of alcohol     Types: 1 Glasses of wine, 1 - 2 Cans of beer per week     Comment: OCC.    Drug use: No   Other Topics  Concern    Caffeine Concern Yes     Comment: 3 cups of coffee per day    Stress Concern No    Weight Concern No    Special Diet No    Exercise Yes     Comment: twice a week    Seat Belt Yes        Current Outpatient Medications:     acetaminophen 500 MG Oral Tab, Take 1 tablet (500 mg total) by mouth every 6 (six) hours as needed for Pain., Disp: , Rfl:     pantoprazole 40 MG Oral Tab EC, TAKE 1 TABLET BY MOUTH EVERY DAY 30 MINUTES BEFORE BREAKFAST, Disp: 90 tablet, Rfl: 0    Continuous Glucose Sensor (FREESTYLE LIO 2 SENSOR) Does not apply Misc, 1 each every 14 (fourteen) days., Disp: 2 each, Rfl: 11    semaglutide (OZEMPIC, 1 MG/DOSE,) 4 MG/3ML Subcutaneous Solution Pen-injector, Inject 1 mg into the skin once a week., Disp: 9 mL, Rfl: 0    Insulin Lispro-aabc, 1 U Dial, (LYUMJEV KWIKPEN) 100 UNIT/ML Subcutaneous Solution Pen-injector, Uses up to 12 units daily as directed in divided doses based on BG readings, Disp: 15 mL, Rfl: 0    Insulin Glargine, 1 Unit Dial, (TOUJEO SOLOSTAR) 300 UNIT/ML Subcutaneous Solution Pen-injector, USES up to 32  UNITS DAILY AS DIRECTED, Disp: 4.5 mL, Rfl: 3    JARDIANCE 25 MG Oral Tab, Take 25 mg by mouth every morning., Disp: 90 tablet, Rfl: 1    Insulin Pen Needle (BD PEN NEEDLE GARCIA 2ND GEN) 32G X 4 MM Does not apply Misc, Up to 4 injections daily, Disp: 300 each, Rfl: 3    metFORMIN HCl 1000 MG Oral Tab, Take 1 tablet (1,000 mg total) by mouth 2 (two) times daily with meals., Disp: 180 tablet, Rfl: 1    rosuvastatin 20 MG Oral Tab, Take 1 tablet (20 mg total) by mouth daily., Disp: 90 tablet, Rfl: 2    Benazepril HCl 5 MG Oral Tab, Take 1 tablet (5 mg total) by mouth daily., Disp: 90 tablet, Rfl: 2    Barberry-Oreg Grape-Goldenseal (BERBERINE COMPLEX OR), Take by mouth., Disp: , Rfl:     ASHWAGANDHA OR, Take 1,000 mg by mouth daily., Disp: , Rfl:     Omega-3 1000 MG Oral Cap, Take 1,000 mg by mouth daily. Take 2 dab daily, Disp: , Rfl:     Levothyroxine Sodium 137 MCG Oral  Tab, Take 137 mcg by mouth every morning before breakfast., Disp: 90 tablet, Rfl: 3    diphenhydrAMINE HCl 25 MG Oral Tab, Take 1 tablet (25 mg total) by mouth every 6 (six) hours as needed for Itching. (Patient not taking: Reported on 7/8/2024), Disp: , Rfl:     CINNAMON OR, Take 1 tablet by mouth daily., Disp: , Rfl:     Coenzyme Q10 (CO Q-10) 100 MG Oral Cap, 200 mg. Take 1 tablet daily  , Disp: , Rfl:      Review of Systems  The Review of Systems has been reviewed by me during today.       Physical Findings   Pulse 94   Temp 97.3 °F (36.3 °C) (Temporal)   Physical Exam  Vitals reviewed.   Constitutional:       General: He is not in acute distress.     Appearance: Normal appearance. He is not ill-appearing, toxic-appearing or diaphoretic.          Comments: Laparoscopic incision x 3 are clean, dry and healing appropriately. No surrounding erythema or drainage. No fluctuance to palpation. No signs of infection.      HENT:      Head: Normocephalic and atraumatic.      Mouth/Throat:      Pharynx: Oropharynx is clear.   Eyes:      General: No scleral icterus.     Conjunctiva/sclera: Conjunctivae normal.   Pulmonary:      Effort: Pulmonary effort is normal. No respiratory distress.   Abdominal:      General: There is no distension.      Palpations: Abdomen is soft.      Tenderness: There is no abdominal tenderness. There is no guarding or rebound.   Skin:     General: Skin is warm and dry.      Coloration: Skin is not jaundiced.      Findings: No bruising.      Comments: Incision sites x 3 are dry, clean, and healing appropriately. Steri-strips present on the 2 lateral incision sites. No drainage, erythema, or other signs of infection.   Neurological:      Mental Status: He is alert.   Psychiatric:         Mood and Affect: Mood normal.         Behavior: Behavior normal.          Assessment   1. Postoperative state    2. Sebaceous cyst        Plan   The patient is doing well postoperatively.  Continue Diet as  tolerated.  Tylenol and Ibuprofen as needed for pain control.   Continue local wound care, soap and water to the incisions.   Pathology discussed with the patient.   Recommend Lifting restrictions for 1 more week.   All the patient's questions and concerns were addressed. They voiced understanding and are in agreement with the plan     Follow Up  They may follow up with [facility] general surgery on an as-needed basis.      JOHNIE SamS  Veronica Garcia PA-C

## 2024-08-13 ENCOUNTER — PATIENT MESSAGE (OUTPATIENT)
Dept: ENDOCRINOLOGY CLINIC | Facility: CLINIC | Age: 59
End: 2024-08-13

## 2024-08-15 DIAGNOSIS — E11.65 TYPE 2 DIABETES MELLITUS WITH HYPERGLYCEMIA, WITHOUT LONG-TERM CURRENT USE OF INSULIN (HCC): ICD-10-CM

## 2024-08-15 RX ORDER — INSULIN LISPRO 100 [IU]/ML
INJECTION, SOLUTION INTRAVENOUS; SUBCUTANEOUS
Qty: 60 ML | Refills: 1 | Status: SHIPPED | OUTPATIENT
Start: 2024-08-15

## 2024-08-15 NOTE — TELEPHONE ENCOUNTER
Pt is awaiting pump shipment and [place]ville 2 +   Pump is filled with vials but can be done with pens- so lyumjev vials were sent today   If patient needs short term supply of Lyumjev, we can offer sample

## 2024-08-15 NOTE — TELEPHONE ENCOUNTER
Requested Prescriptions     Pending Prescriptions Disp Refills    LYUMJEV KWIKPEN 100 UNIT/ML Subcutaneous Solution Pen-injector [Pharmacy Med Name: LYUMJEV 100 UNIT/ML KWIKPEN]  0     Sig: USES UP TO 12 UNITS DAILY AS DIRECTED IN DIVIDED DOSES BASED ON BG READINGS    METFORMIN HCL 1000 MG Oral Tab [Pharmacy Med Name: METFORMIN HCL 1,000 MG TABLET] 60 tablet 5     Sig: TAKE 1 TABLET BY MOUTH TWICE A DAY WITH MEALS     Future Appointments   Date Time Provider Department Center   9/13/2024  8:00 AM Mela Edmondson DO [facility]   10/2/2024  8:15 AM Ashley Main, APRN [facility]     Last A1c value was 7.4% done 6/19/2024.  Refill 6/19/24 [facility]   LOV 6/19/24 Jacy

## 2024-08-20 RX ORDER — INSULIN LISPRO-AABC 100 [IU]/ML
INJECTION, SOLUTION SUBCUTANEOUS
Qty: 15 ML | Refills: 0 | Status: SHIPPED | OUTPATIENT
Start: 2024-08-20

## 2024-08-20 NOTE — TELEPHONE ENCOUNTER
Called pt to offer sample again informed patient if they would be able to come to [facility] location patient states they came in Thursday informed pt I would not be in that location and asked if he would come in NP instead. Pt states no he could not I then offered to stay later for Wednesday for patient to  and pt still states he wouldn't be able to come.     Pt did  Vials to start pump

## 2024-09-05 ENCOUNTER — PATIENT MESSAGE (OUTPATIENT)
Dept: ENDOCRINOLOGY CLINIC | Facility: CLINIC | Age: 59
End: 2024-09-05

## 2024-09-06 NOTE — TELEPHONE ENCOUNTER
From: Hugo Branham  To: Ashley Main  Sent: 9/5/2024 8:03 PM CDT  Subject: Sensor 2 Plus    Dear Ashley,     I want to mention that I received the sensors today, Thursday. Please let me know what days are available to review and train with the new device.     Regards.    Hugo Branham.

## 2024-09-06 NOTE — TELEPHONE ENCOUNTER
Patient would like to schedule appointment with educator- offered current openings for pump start appointment.

## 2024-09-06 NOTE — TELEPHONE ENCOUNTER
Patient confirmed appointment time- scheduled appointment as below  Future Appointments   Date Time Provider Department Center   9/13/2024  8:00 AM Mela Edmondson DO [facility]   9/13/2024  2:00 PM Xiao Taylor, RN [facility]   10/2/2024  8:15 AM Ashley Main APRN [facility]     Advised to bring short acting insulin, pump and supplies with it, and a couple of Kaleb sensors. Please advise if needing anything further?

## 2024-09-08 NOTE — TELEPHONE ENCOUNTER
Pump settings calculated (averaged weight/TDD MDI )     Basal rate 1.2 units/hour  ISF 54  ICR 1:15   BG target 110 mg/dl  (CIQ default)   Active insulin time 5 hours (CIQ default)     Max bolus 15 u   Max basal 2.4 u/hour      Will need insulin pump back up plan and glucagon rx

## 2024-09-13 ENCOUNTER — DIABETIC EDUCATION (OUTPATIENT)
Dept: ENDOCRINOLOGY CLINIC | Facility: CLINIC | Age: 59
End: 2024-09-13
Payer: COMMERCIAL

## 2024-09-13 ENCOUNTER — OFFICE VISIT (OUTPATIENT)
Dept: FAMILY MEDICINE CLINIC | Facility: CLINIC | Age: 59
End: 2024-09-13
Payer: COMMERCIAL

## 2024-09-13 VITALS
WEIGHT: 180 LBS | OXYGEN SATURATION: 97 % | HEIGHT: 68.9 IN | BODY MASS INDEX: 26.66 KG/M2 | SYSTOLIC BLOOD PRESSURE: 126 MMHG | TEMPERATURE: 98 F | HEART RATE: 85 BPM | RESPIRATION RATE: 17 BRPM | DIASTOLIC BLOOD PRESSURE: 74 MMHG

## 2024-09-13 DIAGNOSIS — Z12.5 SCREENING FOR PROSTATE CANCER: ICD-10-CM

## 2024-09-13 DIAGNOSIS — E11.65 TYPE 2 DIABETES MELLITUS WITH HYPERGLYCEMIA, WITHOUT LONG-TERM CURRENT USE OF INSULIN (HCC): Primary | ICD-10-CM

## 2024-09-13 DIAGNOSIS — Z23 NEED FOR VACCINATION: ICD-10-CM

## 2024-09-13 DIAGNOSIS — Z00.00 ROUTINE GENERAL MEDICAL EXAMINATION AT A HEALTH CARE FACILITY: Primary | ICD-10-CM

## 2024-09-13 PROCEDURE — 90750 HZV VACC RECOMBINANT IM: CPT | Performed by: FAMILY MEDICINE

## 2024-09-13 PROCEDURE — 90471 IMMUNIZATION ADMIN: CPT | Performed by: FAMILY MEDICINE

## 2024-09-13 PROCEDURE — 99396 PREV VISIT EST AGE 40-64: CPT | Performed by: FAMILY MEDICINE

## 2024-09-13 NOTE — PROGRESS NOTES
Hugo Branham   8/24/1965 was seen for Initial Individual Pump Start: Tandem Insulin Pump  Date: 9/13/2024 Referring Provider: Ashley GORE     Start time: 1:55 PM End time: 3:40 PM    He reports he took Toujeo 28 units this morning. He took off his last sensor when it ended on Tuesday. He did not bring  to upload.       Insulin Pump Brand: TSlim with Kaleb 2+  Basic programming of all settings reviewed and entered into pump.   See training checklist in patient chart.    PROFILE NAME: Hugo YANEZ  BASAL RATE  CORRECTION FACTOR  CARB RATIO  TARGET    12 A - 12 A  1.2 units/hr  1:54 units/mg/dL  1:15 units/grams  110 mg/dL     Max Basal: 2.4  Max Bolus: 20  Active Insulin Time: 5 hours    Control IQ: ON  Weight: 180 lbs  TDD: 31    Infusion Sets: [employer] XC   Infusion Set placed RLQ on 9/13/2024     Patient skills:   Taught concept of insulin pump therapy.  Reviewed utilization of insulin to carbohydrate ratio and correction factor  Reviewed difference between basal and bolus insulin  Verbalized understanding of prevention/treatment for hypoglycemia, hyperglycemia, DKA, illness with insulin pump therapy.  Discussed when to call the 1-800 number for pump problems, diabetes educator, or provider.   Practiced filling reservoir and inserting infusion set.  Practiced connecting sensor with insulin pump and how to maintain Bluetooth connection.  Reviewed troubleshooting with insulin pump.  Reviewed back up plan:  Toujeo 28 units once daily      Lyumjev kwik pen  If blood sugar is under 180  before the meal : NO insulin   If blood sugar is 180- 220, take 3 units of insulin   If blood sugar is 221-260, take 4 units of insulin   If blood sugar is 261-300, take  5 units of insulin   If blood sugar is over 301, take 6 units of insulin    Comments: patient started Kaleb 2+ sensor on janel as instructed. Instructions on sensor use with pump provided. Patient took long acting insulin this morning. Temp basal set  for 0% for 13 hours until patient hits 24 hours after taking last dose of long acting insulin. Instructed patient to turn on Control IQ at that time.    Patient verbalized understanding and has no further questions at this time.    Patient Goals/Recommendations: Patient chosen goals included in patient's instructions for today.  Follow up with educator in 1 week.    Patient Chosen Goals:   1. Check in with educator over the next 3 days   2. Keep 15g of quick acting carb with you, in case of a low blood sugar   3. Twice in doubt, take pump site out (follow high blood sugar protocol provided at visit)   4.  Use sensor when using insulin pump for optimum control     Plan:   Advised Hugo Branham to use vendor training resources for reinforcement. Follow up appointment set for 09/24/2024 at 7 AM. Patient verbalized understanding and has no further questions at this time.       Xiao Taylor, ATIFN-RN, [facility]

## 2024-09-13 NOTE — PROGRESS NOTES
Hugo Branham is a 59 year old male       Chief Complaint   Patient presents with    Wellness Visit           HPI:       No new complaints.    Colonoscopy:   UTD.    Wt Readings from Last 6 Encounters:   09/13/24 180 lb (81.6 kg)   07/15/24 179 lb (81.2 kg)   07/08/24 180 lb 3.2 oz (81.7 kg)   06/19/24 180 lb 3.2 oz (81.7 kg)   06/14/24 184 lb 12.8 oz (83.8 kg)   01/12/24 180 lb 3.2 oz (81.7 kg)     Body mass index is 26.66 kg/m².           Patient Active Problem List   Diagnosis    Asymptomatic varicose veins    Hx of papillary thyroid carcinoma    Postsurgical hypothyroidism    Mixed hyperlipidemia    Overweight (BMI 25.0-29.9)    Encounter for long-term current use of medication    Gastroesophageal reflux disease with esophagitis    Third degree hemorrhoids    Type 2 diabetes mellitus with hyperglycemia, with long-term current use of insulin (HCC)    Seasonal allergic rhinitis    Coronary artery calcification of native artery    Agatston coronary artery calcium score between 200 and 399    Cardiac calcification (HCC)    Sebaceous cyst    Type 2 diabetes mellitus with hyperlipidemia (HCC)     Current Outpatient Medications   Medication Sig Dispense Refill    Insulin Lispro-aabc, 1 U Dial, (LYUMJEV KWIKPEN) 100 UNIT/ML Subcutaneous Solution Pen-injector USES UP TO 12 UNITS DAILY AS DIRECTED IN DIVIDED DOSES BASED ON BG READINGS 15 mL 0    METFORMIN HCL 1000 MG Oral Tab TAKE 1 TABLET BY MOUTH TWICE A DAY WITH MEALS 60 tablet 5    insulin lispro (HUMALOG) 100 UNIT/ML Injection Solution Uses up to 60 units daily via insulin pump 60 mL 1    acetaminophen 500 MG Oral Tab Take 1 tablet (500 mg total) by mouth every 6 (six) hours as needed for Pain.      pantoprazole 40 MG Oral Tab EC TAKE 1 TABLET BY MOUTH EVERY DAY 30 MINUTES BEFORE BREAKFAST 90 tablet 0    Continuous Glucose Sensor (FREESTYLE LIO 2 SENSOR) Does not apply Misc 1 each every 14 (fourteen) days. 2 each 11    semaglutide (OZEMPIC, 1 MG/DOSE,) 4 MG/3ML  Subcutaneous Solution Pen-injector Inject 1 mg into the skin once a week. 9 mL 0    Insulin Glargine, 1 Unit Dial, (TOUJEO SOLOSTAR) 300 UNIT/ML Subcutaneous Solution Pen-injector USES up to 32  UNITS DAILY AS DIRECTED 4.5 mL 3    JARDIANCE 25 MG Oral Tab Take 25 mg by mouth every morning. 90 tablet 1    Insulin Pen Needle (BD PEN NEEDLE GARCIA 2ND GEN) 32G X 4 MM Does not apply Misc Up to 4 injections daily 300 each 3    rosuvastatin 20 MG Oral Tab Take 1 tablet (20 mg total) by mouth daily. 90 tablet 2    Benazepril HCl 5 MG Oral Tab Take 1 tablet (5 mg total) by mouth daily. 90 tablet 2    Barberry-Oreg Grape-Goldenseal (BERBERINE COMPLEX OR) Take by mouth.      ASHWAGANDHA OR Take 1,000 mg by mouth daily.      Omega-3 1000 MG Oral Cap Take 1,000 mg by mouth daily. Take 2 dab daily      Levothyroxine Sodium 137 MCG Oral Tab Take 137 mcg by mouth every morning before breakfast. 90 tablet 3    diphenhydrAMINE HCl 25 MG Oral Tab Take 1 tablet (25 mg total) by mouth every 6 (six) hours as needed for Itching.      CINNAMON OR Take 1 tablet by mouth daily.      Coenzyme Q10 (CO Q-10) 100 MG Oral Cap 200 mg. Take 1 tablet daily           Past Medical History:    Agatston coronary artery calcium score between 200 and 399    2/24/2022 CT calcium scoring      Asymptomatic varicose veins    Balanitis    Likely candidal secondary to diabetes     Cardiac calcification (HCC)    2/24/2022 CT calcium scoring  FINDINGS:  CALCIUM SCORING RESULTS (Volume / Agatston):   LEFT MAIN:    0.00 / 0.00   RCA:      131.61 / 149.01   LAD:      81.54 / 97.51   CIRCUMFLEX:    14.31 / 9.54   PDA:      0.00 / 0.00    TOTAL:      227.45 / 256.06       Coronary artery calcification of native artery    2/24/2022 CT calcium scoring      Disorder of liver    FATTY LIVER    ED (erectile dysfunction)    Esophageal reflux    Essential hypertension, benign    Gastroesophageal reflux disease with esophagitis    Heartburn    Hx of papillary thyroid carcinoma     Hyperlipidemia LDL goal < 100    Low HDL (under 40)    Mass of subcutaneous tissue    Mixed hyperlipidemia    Other specified visual disturbances    Papillary thyroid carcinoma (HCC)    Dx in 12/2011: Papillary thyroid carcinoma measuring 0.9 cm in the right lobe - T1NxMx - Stage I    Polyp of colon    Postsurgical hypothyroidism    Dx in 12/2011 - total thyroidectomy for thyroid cancer    Sebaceous cyst    Multiple of back      Type 2 diabetes mellitus with hyperglycemia, with long-term current use of insulin (HCC)    Type 2 diabetes mellitus with hyperglycemia, without long-term current use of insulin (HCC)    Type 2 diabetes mellitus with hyperlipidemia (HCC)    Type II or unspecified type diabetes mellitus without mention of complication, not stated as uncontrolled    Vitamin D deficiency    Dx in 2/2013      Past Surgical History:   Procedure Laterality Date    Colonoscopy  03/13/2020    Dr Osei at [facility]    Other surgical history      Surgery for veins in the legs    Thyroidectomy      On 12/19/2011: Total thyroidectomy for thyroid cancer by Dr. Estrada      Family History   Problem Relation Age of Onset    Heart Disease Maternal Aunt     Cancer Maternal Uncle     Stroke Neg     Thyroid Disorder Neg     Diabetes Neg       Social History:  Social History     Socioeconomic History    Marital status:     Number of children: 2   Occupational History    Occupation: Operations Mgmt   Tobacco Use    Smoking status: Never    Smokeless tobacco: Never   Vaping Use    Vaping status: Never Used   Substance and Sexual Activity    Alcohol use: Yes     Alcohol/week: 2.0 - 3.0 standard drinks of alcohol     Types: 1 Glasses of wine, 1 - 2 Cans of beer per week     Comment: OCC.    Drug use: No   Other Topics Concern    Caffeine Concern Yes     Comment: 3 cups of coffee per day    Stress Concern No    Weight Concern No    Special Diet No    Exercise Yes     Comment: twice a week    Seat Belt Yes        Occ:  .  Marital Status: . Children: 1.   Exercise:  walking 30,000 steps per day per pt   Diet: watches sugar closely and closely watching his diet     REVIEW OF SYSTEMS:   GENERAL: Feels well overall. Denies fever or chills.   SKIN: Denies any new or changing skin lesions.  EYES: Denies changes in vision.  HENT: Denies upper respiratory symptoms.  LUNGS: Denies TRAVON, wheezing, cough.  Dyspnea on exertion.  CARDIOVASCULAR: Denies CP or palpitations. Denies chest pain on exertion.  GI: Denies abdominal pain, denies heartburn, denies n/v/c/d/change in stools/blood in stool/black stool/change in appetite  : Denies nocturia or changes in urinary stream. Denies scrotal mass/abnormal discharge from urethra.  MUSCULOSKELETAL: No complaints of joint or muscle aches or pains.  NEURO: Denies headaches/dizziness  PSYCH: Denies depression or anxiety  HEMATOLOGIC: No complaints of easy bruising/bleeding/anemia/blood clot disorders  ENDOCRINE: No complaints of enlarged neck glands/polyuria/polydypsia.      EXAM:   /74   Pulse 85   Temp 97.9 °F (36.6 °C) (Temporal)   Resp 17   Ht 5' 8.9\" (1.75 m)   Wt 180 lb (81.6 kg)   SpO2 97%   BMI 26.66 kg/m²   Body mass index is 26.66 kg/m².   GENERAL: NAD. Pleasant, well developed, nonill appearing male  SKIN: No visible rashes.  No visible suspicious lesions.  HENT: NCAT, EACs clear b/l, TMs normal b/l.  Nose: No nasal discharge.  OP: MMM.  Posteriorly no exudate or erythema.  EYES: PERRL.  EOMI.  Conjunctiva non-injected.  Non-icteric.  NECK: Supple. No cervical or supraclavicular adenopathy, no thyromegaly/thyroid nodules appreciated, no masses  LUNGS: CTA A/P, no wheezes/ronchi/rales/crackles, normal air excursion  CARDIOVASCULAR: RRR, no murmur.  No lower extremity edema.  Abdomen: Non-tender to palpation, no HSM/masses/pulsations  MUSCULOSKELETAL: Back with normal AROM, no joint swelling  EXTREMITIES: No cyanosis, clubbing, or edema  NEURO: A&O x3.  No  gross motor or gross sensory abnormality.  Bilateral barefoot skin diabetic exam is normal, visualized feet and the appearance is normal.  Bilateral monofilament/sensation of both feet is normal.  Pulsation pedal pulse exam of both lower legs/feet is normal.  PSYCH: Normal affect. No apparent thought disorder. Average judgement and insight.    Immunization History   Administered Date(s) Administered    >=3 YRS TRI  MULTIDOSE VIAL (05981) FLU CLINIC 10/27/2014    Covid-19 Vaccine Pfizer 30 mcg/0.3 ml 04/08/2021, 04/30/2021    Covid-19 Vaccine Pfizer Price-Sucrose 30 mcg/0.3 ml 01/12/2022    FLULAVAL 6 months & older 0.5 ml Prefilled syringe (91207) 09/09/2023    FLUZONE 6 months and older PFS 0.5 ml (57372) 09/09/2017, 10/03/2021    Flucelvax 0.5 Ml Quad PFS Single Dose 09/28/2019, 09/20/2020    Flucelvax 0.5ml Vaccine 11/11/2018    Flucelvax Influenza vaccine, trivalent (ccIIV3), 0.5mL IM 11/11/2018, 09/24/2022    Influenza 09/28/2019, 09/20/2020, 11/13/2022    Pneumococcal (Prevnar 13) 12/06/2018    Pneumovax 23 10/12/2011, 09/09/2017    TDAP 09/09/2023    Zoster Vaccine Recombinant Adjuvanted (Shingrix) 09/09/2023, 09/13/2024             DATA:      Diabetes:    Lab Results   Component Value Date    A1C 7.4 (A) 06/19/2024    A1C 7.0 (A) 12/20/2023    A1C 7.1 (H) 08/12/2023     (H) 02/26/2014     (H) 09/04/2013     (H) 05/31/2013     Lab Results   Component Value Date    CHOLEST 110 06/22/2024    CHOLEST TNP 06/19/2024    CHOLEST 113 08/12/2023     Lab Results   Component Value Date    HDL 40 06/22/2024    HDL TNP 06/19/2024    HDL 39 (L) 08/12/2023     Lab Results   Component Value Date    LDL 51 06/22/2024    LDL TNP 06/19/2024    LDL 51 08/12/2023     Lab Results   Component Value Date    TRIG 102 06/22/2024    TRIG TNP 06/19/2024    TRIG 157 (H) 08/12/2023    TRIGLY 260 12/06/2018     Lab Results   Component Value Date    AST 15 06/22/2024    AST 17 08/12/2023    AST 17 08/12/2023     Lab  Results   Component Value Date    ALT 17 06/22/2024    ALT 20 08/12/2023    ALT 20 08/12/2023                 ASSESSMENT AND PLAN:   Hugo Branham is a 59 year old male who presents for a complete physical exam.       Encounter Diagnoses   Name Primary?    Routine general medical examination at a health care facility Yes    Need for vaccination     Screening for prostate cancer          Patient reminded to schedule appointment with optometrist/ophthalmologist for dilated retinal eye exam.      1. Routine general medical examination at a health care facility  Patient provided handout on men's health and prevention.    Recommend healthy diet including green leafy vegetables, fresh fruits and protein.  Aerobic exercise for total of 150 minutes/week is recommended for cardiovascular fitness, and 45-60 minutes 6-7 days a week to help obtain weight loss.     2. Need for vaccination  - Zoster Vac (Shingrix) in office vaccine- Non-Medicare or Medicare with ABN    3. Screening for prostate cancer  - PSA, TOTAL W REFLEX TO PSA, FREE [94154][Q]          Orders Placed This Encounter   Procedures    PSA, TOTAL W REFLEX TO PSA, FREE [10878][Q]    Zoster Vac (Shingrix) in office vaccine- Non-Medicare or Medicare with ABN       Imaging & Consults:  ZOSTER VACC RECOMBINANT IM NJX      Return in about 1 year (around 9/13/2025) for Annual wellness visit.  Sooner if needed.

## 2024-09-13 NOTE — PATIENT INSTRUCTIONS
10 to 15 minutes before you eat ANYTHING, please unlock your pump screen and click BOLUS. A bolus is a dose of insulin we use before we eat.     Once your sensor has passed the first 12 hours, it will automatically pull the sensor glucose into the bolus calculator.    If you are counting the carbs in your meal or your snack, you can enter that. If you are not counting carbs, please enter one of the following:  SNACK: 15 grams of carbs  STANDARD MEAL: 30 grams of carbs  LARGE MEAL: 45 grams of carbs    _______________________________________________     BACK UP PLAN IF YOU ARE NOT USING YOUR INSULIN PUMP:    Toujeo 28 units once daily     Lyumjev kwik pen  If blood sugar is under 180  before the meal : NO insulin   If blood sugar is 180- 220, take 3 units of insulin   If blood sugar is 221-260, take 4 units of insulin   If blood sugar is 261-300, take  5 units of insulin   If blood sugar is over 301, take 6 units of insulin       _______________________________________________     Continue:   Kaleb 2  Jardiance 25mg once daily    Metformin 1000mg twice daily   Ozempic 1mg subcutaneous once weekly     _______________________________________________     Always check your blood sugar if you feel any symptoms of a low blood sugar.      If you have a low blood sugar (less than 70 mg/dL) follow the \"Rule of 15\" to treat it:    1.) Take 15 grams of a quick acting carbohydrate (1 tablespoon of honey, 3-4 glucose tablets, 1/2 cup fruit juice, 1/2 can regular soda, or to-go pouch of applesauce). Only eat ONE 15g SERVING of a quick acting carbohydrate.    2.) Then check your blood sugar again after 15 minutes of drinking or eating the quick acting carbohydrate to be sure your blood sugar is above 70 mg/dL.     If your blood sugar is still less than 70 mg/dL, repeat treatment of 15 grams of a quick acting carbohydrate (1 tablespoon of honey, 3-4 glucose tablets, 1/2 cup fruit juice, 1/2 can regular soda, or to-go pouch  of  applesauce).    3.) If your next meal is more than one hour away, eat a small snack. Try to have some protein and complex carbohydrate (peanut butter crackers, pretzels and cheese, etc).     4.) If you are not sure what caused your low blood sugar, call your healthcare provider.    5.) Always check your blood sugar before you drive.     _______________________________________________       To treat a low, we recommend you carry with you easy, pre-portioned treatment for low blood sugars that are 15G of carbs:   - Children sized squeeze pouch applesauce (high fiber + carbs help prevent too high of a spike)  - Small children's sized juicebox- 15g carb --> 4oz juice box  - Glucose tablets from [employer], you can find them near diabetes supplies --> Note, you will need to eat 3-4 tablets to get to 15g of carbs  - Children sized fruit snack pack- look for one with 15 grams of total carbohydrate     AVOID complex carbs, or foods that contain fats along with carbs (like chocolate) can slow the absorption of glucose and should NOT be used to treat an emergency low.    _______________________________________________     If your low blood sugar is not treated, you may progress to severe low blood sugar that can include: disorientation, seizures, unconsciousness, and death.  _______________________________________________     Severe hypoglycemia (low blood sugar)   Patient Education: Glucagon Emergency Kit     You should always have back-up glucagon in case of emergency. Glucagon (GVOKE or Baqsimi) is given to treat a severe low blood sugar that is not responding to eating or drinking carbohydrate,   or if you are not awake enough to safely eat or swallow.     If you are someone who is at risk for severe hypoglycemia, you should have a current (nonexpired) glucagon emergency kit (such as GVOKE or Baqsimi) on hand AND someone who knows where it is kept and how to use it.    When low blood sugar is not treated and you need  someone to help you recover, it is considered a severe event.      Treating severe hypoglycemia     Glucagon is a hormone produced in the pancreas that stimulates your liver to release stored glucose into your bloodstream when your blood sugar levels are too low.     Glucagon is an emergency medicine that will raise your blood sugar if you are unable to eat or drink. It is an important part of being prepared if you have diabetes and take insulin or a medication that can cause hypoglycemia (low blood sugar).      Glucagon is available by prescription and is either injected or administered or puffed into the nostril.      The people you are in frequent contact with (for example, friends, family members and coworkers) should be instructed on how to give you glucagon to treat severe hypoglycemia.

## 2024-09-24 ENCOUNTER — DIABETIC EDUCATION (OUTPATIENT)
Age: 59
End: 2024-09-24
Payer: COMMERCIAL

## 2024-09-24 DIAGNOSIS — E11.65 TYPE 2 DIABETES MELLITUS WITH HYPERGLYCEMIA, WITHOUT LONG-TERM CURRENT USE OF INSULIN (HCC): Primary | ICD-10-CM

## 2024-09-24 NOTE — PROGRESS NOTES
Hugo Branham  : 1965 was seen for Individual Insulin Pump Follow up: Tandem Insulin Pump    Date: 2024  Start time: 6:55 AM  End time: 7:10 AM    He reports he is doing well and adjusting to pump. He did have one issue with a pump site where he did not remove the needle guard. He has not had any issues since.     We discussed in detail use of bolus function.    Reviewed pump settings:    Average Glucose: 145 mg/dL  Standard Deviation: 39 mg/dL  Coefficient of Variation: 27%     LABEL  RANGE  NOW  PREVIOUSLY    Very High  > 250 mg/dL  1.6 %  1.5%    High  181-250 mg/dL  18 %  25 %    Target Range   mg/dL  80 %  72 %    Low  54-69 mg/dL  0.4 %  1.3 %    Very Low  <54 mg/dL  0 %  0 %       PROFILE NAME: Hugo YANEZ  BASAL RATE  CORRECTION FACTOR  CARB RATIO  TARGET    12 A - 12A  1.2 units/hr  1:54 units/mg/dL  1:15 units/grams  110 mg/dL     _______________________________________________     Evaluated pump download:    Control IQ Summary  Time Active: 100% (6d 5h)  CGM Inactive: 0% (0d 0h)  Pump Inactive: 0% (0d 1h)     Average Sleep  Duration 0h  Weekly 0 times    Average Activity  Duration 0h  Weekly 0 times     Average Daily Dose: 35.08 units  Basal 77%  Bolus 23%     Average Daily Boluses - 10 boluses  Manual 0% - 0 bolus  Control % - 10 boluses     Average Daily Carbs - 0g      Overriding pump? no  Pump/CGM data printout sent to scan.  _______________________________________________   RECOMMENDATIONS:  start using advanced bolus features and recommend continuous glucose sensor evaluation  He has not been inputting carbohydrates for each time he eats. We reviewed in detail how to use feature each time he eats either through mobile bolus option or through pump.     Pump setting changes:  We set up sleep schedule today, 7 days a week 10 PM to 3 AM.   __________________________________________    Reviewed pump back up plan:  Toujeo 28 units once daily      Lyumjev kwik pen  If blood  sugar is under 180  before the meal : NO insulin   If blood sugar is 180- 220, take 3 units of insulin   If blood sugar is 221-260, take 4 units of insulin   If blood sugar is 261-300, take  5 units of insulin   If blood sugar is over 301, take 6 units of insulin  __________________________________________  Patient Goals: start bolusing for food.     __________________________________________    Plan: follow up as need with changes.     Future Appointments   Date Time Provider Department Center   10/2/2024  8:15 AM Ashley Main, [id number]        Patient verbalized understanding and has no further questions at this time.      Xiao Taylor, ATIFN-RN, [facility]

## 2024-09-25 ENCOUNTER — TELEPHONE (OUTPATIENT)
Age: 59
End: 2024-09-25

## 2024-10-01 NOTE — PROGRESS NOTES
Hugo Branham is a 59 year old  presents today for diabetes management.   Primary care physician: Mela Edmondson DO   Last DM appt: 09- --. Started on insulin pump w [employer]   T slim CIQ with orville 2+ CGM      Today A1C 7.4 % ( last A1C 7.4%)      Reviewed T slim pump and Orville 2+  CGM report/trends w patient today: (full download in media)   Sensor active time: 97 %    2 week  GMI 6.6 %    Average glucose: 140 mg/dl     Hypoglycemia 0%    TIR 85 %  (ADA recommended goal > 70%)   Variability WNL ( < 33%)       Pump data:     Hugo Branham  YOB: 1965  Last upload date: Oct 2, 2024, 7:21 AM  t:slim X2 (#[id number])    2 Weeks (Sep 19 - Oct 2, 2024)      Time in range comparison    Current 2 weeks      >  250 mg/dL       1.3%               181  -  250 mg/dL  13%                70  -  180 mg/dL   85%                54  -  69 mg/dL    0.5%               <  54 mg/dL        0%                   Previous 2 weeks     >  250 mg/dL       1.2%               181  -  250 mg/dL  20%                70  -  180 mg/dL   78%                54  -  69 mg/dL    1.0%               <  54 mg/dL        0%                       Control-IQ summary    Time active      97%              12d 23 hr          CGM inactive     1%               4 hr               Control-IQ off   0%               0 hr               Pump inactive    1%               4 hr                   Insulin summary    Average daily dose     35.48 units        Basal                  73%              25.85 units        Bolus                  27%              9.63 units           Bolus review    Type               Food             38%              3.69 units         Correction       4%               0.40 units         Override         0%               0.00 units         Control-IQ       58%              5.54 units           Load activity    Cartridge change  every 3.2d         Tubing fill       every 2.4d         Cannula fill      every 2.4d             Diabetes  History:  Type 2 DM ~ 2011   Patient has not had hospitalizations for blood sugar issues  denies any history of pancreatitis      2011 Papillary thyroid cancer: seeing Dr Anna , endocrinology annually       Previous DM therapies:  Glipizide: change in therapy   Victoza: /Farxiga :  formulary     Current DM Regimen:       Ozempic 1.0 mg subcutaneous once weekly   Jardiance 25mg once daily    Metformin 1000mg twice daily   T slim insulin pump w CIQ  w Kaleb 2 + CGM  Basal :   12MN: 1.2 u/hour  Bolus settings:   ICR  12MN  15   ISF  12MN  54   BG target, 110mg/dl       5 hour active insulin time       Pump back up: Toujeo solstar: 24 units once daily     HGBA1C:    Lab Results   Component Value Date    A1C 7.4 (A) 10/02/2024    A1C 7.4 (A) 06/19/2024    A1C 7.0 (A) 12/20/2023     (H) 02/26/2014       Lab Results   Component Value Date    CHOLEST 110 06/22/2024    CHOLEST TNP 06/19/2024    TRIG 102 06/22/2024    TRIG TNP 06/19/2024    HDL 40 06/22/2024    HDL TNP 06/19/2024    LDL 51 06/22/2024    LDL TNP 06/19/2024     Lab Results   Component Value Date    MICROALBCREA  01/11/2023      Comment:      Unable to calculate due to Urine Creatinine <13.00 mg/dL        Lab Results   Component Value Date    CREATSERUM 0.80 06/22/2024    CREATSERUM 0.93 08/12/2023    EGFRCR 103 06/22/2024    EGFRCR 96 08/12/2023     Lab Results   Component Value Date    AST 15 06/22/2024    AST 17 08/12/2023    ALT 17 06/22/2024    ALT 20 08/12/2023       Lab Results   Component Value Date    TSH 1.130 05/28/2019    TSH 0.731 05/12/2018    T4F 1.7 08/12/2023     Component      Latest Ref Rng 6/22/2024   ZINC TRANSPORTER 8 (ZNT8) ANTIBODY      <15 U/mL <10    C-PEPTIDE      0.80 - 3.85 ng/mL 1.53            Component      Latest Ref Rng & Units 9/2/2021   GLUTAMIC ACID$DECARBOXYLASE 65 AB      <5 IU/ml <5   ISLET CELL ANTIBODY SCREEN      neg neg       DM Complications:  Microvascular:   Neuropathy: no  Retinopathy: no  Nephropathy:  no    Macrovascular:  PVD: no  CAD: no  Stroke/CVA: no    Modifying factors:  Medication adherence: yes   Recent steroids, illness or infections: ( past 90d) no     Allergies: Adhesive tape and Neosporin [neomycin-bacitracin-polymyxin]    Past Medical History:    Agatston coronary artery calcium score between 200 and 399    2/24/2022 CT calcium scoring      Asymptomatic varicose veins    Balanitis    Likely candidal secondary to diabetes     Cardiac calcification (HCC)    2/24/2022 CT calcium scoring  FINDINGS:  CALCIUM SCORING RESULTS (Volume / Agatston):   LEFT MAIN:    0.00 / 0.00   RCA:      131.61 / 149.01   LAD:      81.54 / 97.51   CIRCUMFLEX:    14.31 / 9.54   PDA:      0.00 / 0.00    TOTAL:      227.45 / 256.06       Coronary artery calcification of native artery    2/24/2022 CT calcium scoring      Disorder of liver    FATTY LIVER    ED (erectile dysfunction)    Esophageal reflux    Essential hypertension, benign    Gastroesophageal reflux disease with esophagitis    Heartburn    Hx of papillary thyroid carcinoma    Hyperlipidemia LDL goal < 100    Low HDL (under 40)    Mass of subcutaneous tissue    Mixed hyperlipidemia    Other specified visual disturbances    Papillary thyroid carcinoma (HCC)    Dx in 12/2011: Papillary thyroid carcinoma measuring 0.9 cm in the right lobe - T1NxMx - Stage I    Polyp of colon    Postsurgical hypothyroidism    Dx in 12/2011 - total thyroidectomy for thyroid cancer    Sebaceous cyst    Multiple of back      Type 2 diabetes mellitus with hyperglycemia, with long-term current use of insulin (HCC)    Type 2 diabetes mellitus with hyperglycemia, without long-term current use of insulin (HCC)    Type 2 diabetes mellitus with hyperlipidemia (HCC)    Type II or unspecified type diabetes mellitus without mention of complication, not stated as uncontrolled    Vitamin D deficiency    Dx in 2/2013     Past Surgical History:   Procedure Laterality Date    Colonoscopy  03/13/2020      [facility] at [facility] 10yr plan    Other surgical history      Surgery for veins in the legs    Thyroidectomy      On 12/19/2011: Total thyroidectomy for thyroid cancer by Dr. Estrada     Social History     Socioeconomic History    Marital status:     Number of children: 2   Occupational History    Occupation: Operations Mgmt   Tobacco Use    Smoking status: Never    Smokeless tobacco: Never   Vaping Use    Vaping status: Never Used   Substance and Sexual Activity    Alcohol use: Yes     Alcohol/week: 2.0 - 3.0 standard drinks of alcohol     Types: 1 Glasses of wine, 1 - 2 Cans of beer per week     Comment: OCC.    Drug use: No   Other Topics Concern    Caffeine Concern Yes     Comment: 3 cups of coffee per day    Stress Concern No    Weight Concern No    Special Diet No    Exercise Yes     Comment: twice a week    Seat Belt Yes     Family History   Problem Relation Age of Onset    Heart Disease Maternal Aunt     Cancer Maternal Uncle     Stroke Neg     Thyroid Disorder Neg     Diabetes Neg      Current Medication List:   Current Outpatient Medications   Medication Sig Dispense Refill    Insulin Lispro-aabc, 1 U Dial, (LYUMJEV KWIKPEN) 100 UNIT/ML Subcutaneous Solution Pen-injector 3-4 units at meals when off pump      Insulin Glargine, 1 Unit Dial, (TOUJEO SOLOSTAR) 300 UNIT/ML Subcutaneous Solution Pen-injector 24 units daily when OFF Pump      glucagon (GVOKE HYPOPEN 2-PACK) 1 MG/0.2ML Subcutaneous injection Inject 0.2 mL (1 mg total) into the skin as needed. 0.4 mL 1    pantoprazole 40 MG Oral Tab EC TAKE 1 TABLET BY MOUTH EVERY DAY 30 MINUTES BEFORE BREAKFAST 30 tablet 3    METFORMIN HCL 1000 MG Oral Tab TAKE 1 TABLET BY MOUTH TWICE A DAY WITH MEALS 60 tablet 5    semaglutide (OZEMPIC, 1 MG/DOSE,) 4 MG/3ML Subcutaneous Solution Pen-injector Inject 1 mg into the skin once a week. 9 mL 0    JARDIANCE 25 MG Oral Tab Take 25 mg by mouth every morning. 90 tablet 1    rosuvastatin 20 MG Oral Tab Take 1 tablet (20 mg  total) by mouth daily. 90 tablet 2    Benazepril HCl 5 MG Oral Tab Take 1 tablet (5 mg total) by mouth daily. 90 tablet 2    Omega-3 1000 MG Oral Cap Take 1,000 mg by mouth daily. Take 2 dab daily      Levothyroxine Sodium 137 MCG Oral Tab Take 137 mcg by mouth every morning before breakfast. 90 tablet 3    insulin lispro (HUMALOG) 100 UNIT/ML Injection Solution Uses up to 60 units daily via insulin pump 60 mL 1    acetaminophen 500 MG Oral Tab Take 1 tablet (500 mg total) by mouth every 6 (six) hours as needed for Pain.      Insulin Pen Needle (BD PEN NEEDLE GARCIA 2ND GEN) 32G X 4 MM Does not apply Misc Up to 4 injections daily 300 each 3    Barberry-Oreg Grape-Goldenseal (BERBERINE COMPLEX OR) Take by mouth.      ASHWAGANDHA OR Take 1,000 mg by mouth daily.      diphenhydrAMINE HCl 25 MG Oral Tab Take 1 tablet (25 mg total) by mouth every 6 (six) hours as needed for Itching.      CINNAMON OR Take 1 tablet by mouth daily.      Coenzyme Q10 (CO Q-10) 100 MG Oral Cap 200 mg. Take 1 tablet daily            DM associated review of  symptoms:   Endocrine: Polyuria, polyphagia, polydipsia: no  Neurological: Paresthesias: no  HEENT: Blurred vision: no.   Skin: no rash or wounds  Hematological: Hypoglycemia: no    Review of Systems     LUNGS: denies shortness of breath   CARDIOVASCULAR: denies chest pain  GI: denies abdominal pain, nausea or diarrhea . + GERD sxs   : denies dysuria          Physical exam:  /68   Pulse 76   Resp 17   Wt 182 lb 9.6 oz (82.8 kg)   SpO2 98%   BMI 27.05 kg/m²   Body mass index is 27.05 kg/m².    Physical Exam   Vitals reviewed.  Constitutional: Normal appearance   Cardiovascular: Normal rate   Pulmonary/Chest: Effort normal  Neurological: Alert and oriented .   Psychiatric: Normal mood and affect.     Assessment/Plan:      Mixed hyperlipidemia  Last labs 7.2024   Continue  Rosuvastatin and Omega 3 FA         Type 2 diabetes mellitus with hyperglycemia, without long-term current use  of insulin ([facility])  A1C  7.2 % (last A1C 7.4%)   Weight: 182  lb (last weight: 176 lb )   Diabetes control is improving but needs ongoing management and evaluation  given the chronicity of Diabetes, ongoing medication monitoring and risk for complications     Reminded patient health impact associated with improved glycemic targets   Doing well since starting T slim insulin pump   Would like to get off \"some meds\"   Ok to stop Metformin rx  Will watch trends and insulin usage to see if increasing  Counseled that I prefer to keep him on Jardiance/Ozempic due to T2DM benefit as well as ASVD  benefits     Continue:     Jardiance 25mg once daily    Ozempic 1mg subcutaneous once weekly       T slim insulin pump w CIQ  w Kaleb 2 + CGM  Basal :   12MN: 1.2 u/hour  Bolus settings:   ICR  12MN  15   ISF  12MN  54   BG target, 110mg/dl (CIQ default)   5 hour active insulin time (CIQ default)     Pump back up: Toujeo solstar: 24 units once daily     Discussed pump back up and details on AVS     Reminded about site rotation for subcutaneous injections     Reviewed clinical significance of A1c, adverse effects of suboptimal glucose control, and goals of therapy   Reviewed the A1C test, what the value reflects and the goal for the patient.   Reminded pt on A1C and blood sugar targets (Fasting < 130 and post prandial <180 ) and complications associated with hyperglycemia and uncontrolled DM (on AVS)   Recommended SMBG 3- x daily if not using CGM   Reviewed s/s and treatment of hypoglycemia (on AVS)   GVOKE rx. For severe hypoglycemia emergency   Continue with lifestyle modifications since they have positive impact on diabetes/blood sugars/health (portion control, physical activity, weight loss)   Reinforced timing and adherence with medication, self-monitoring of blood glucose and routine follow up.       The patient is asked to return in 4  m  but recommended to contact DM clinic sooner if questions or concerns.    The patient  indicates understanding of these issues and agrees to the plan.      Orders Placed This Encounter    POC Hemoglobin A1C     Order Specific Question:   Release to patient     Answer:   Immediate    Interpretation code 72 hour glucose monitor    Insulin Lispro-aabc, 1 U Dial, (LYUMJEV KWIKPEN) 100 UNIT/ML Subcutaneous Solution Pen-injector     Sig: 3-4 units at meals when off pump    Insulin Glargine, 1 Unit Dial, (TOUJEO SOLOSTAR) 300 UNIT/ML Subcutaneous Solution Pen-injector     Si units daily when OFF Pump    glucagon (GVOKE HYPOPEN 2-PACK) 1 MG/0.2ML Subcutaneous injection     Sig: Inject 0.2 mL (1 mg total) into the skin as needed.     Dispense:  0.4 mL     Refill:  1     Diabetes complications & risks surveillance:   A1C/Blood pressure: as reported above   Nephropathy screenin Continue  ace rx per PCP   LIPID screening: last labs  Rosuvastatin rx.   Last dilated eye exam: No data recorded   Exam shows retinopathy? No data recorded  Last diabetic foot exam: Last Foot Exam: 24      Reviewed Glucagon: GVOKE demo pen today       The risks and benefits of my recommendations, as well as other treatment options were discussed with the patient today. questions were also answered to the best of my knowledge.

## 2024-10-02 ENCOUNTER — OFFICE VISIT (OUTPATIENT)
Dept: ENDOCRINOLOGY CLINIC | Facility: CLINIC | Age: 59
End: 2024-10-02
Payer: COMMERCIAL

## 2024-10-02 VITALS
OXYGEN SATURATION: 98 % | WEIGHT: 182.63 LBS | RESPIRATION RATE: 17 BRPM | DIASTOLIC BLOOD PRESSURE: 68 MMHG | HEART RATE: 76 BPM | BODY MASS INDEX: 27 KG/M2 | SYSTOLIC BLOOD PRESSURE: 130 MMHG

## 2024-10-02 DIAGNOSIS — Z79.4 TYPE 2 DIABETES MELLITUS WITH HYPERGLYCEMIA, WITH LONG-TERM CURRENT USE OF INSULIN (HCC): Primary | ICD-10-CM

## 2024-10-02 DIAGNOSIS — E11.65 TYPE 2 DIABETES MELLITUS WITH HYPERGLYCEMIA, WITH LONG-TERM CURRENT USE OF INSULIN (HCC): Primary | ICD-10-CM

## 2024-10-02 DIAGNOSIS — Z96.41 INSULIN PUMP IN PLACE: ICD-10-CM

## 2024-10-02 LAB — HEMOGLOBIN A1C: 7.4 % (ref 4.3–5.6)

## 2024-10-02 PROCEDURE — 99214 OFFICE O/P EST MOD 30 MIN: CPT | Performed by: NURSE PRACTITIONER

## 2024-10-02 PROCEDURE — 95251 CONT GLUC MNTR ANALYSIS I&R: CPT | Performed by: NURSE PRACTITIONER

## 2024-10-02 PROCEDURE — 83036 HEMOGLOBIN GLYCOSYLATED A1C: CPT | Performed by: NURSE PRACTITIONER

## 2024-10-02 RX ORDER — GLUCAGON INJECTION, SOLUTION 1 MG/.2ML
1 INJECTION, SOLUTION SUBCUTANEOUS AS NEEDED
Qty: 0.4 ML | Refills: 1 | Status: SHIPPED | OUTPATIENT
Start: 2024-10-02

## 2024-10-02 RX ORDER — INSULIN LISPRO-AABC 100 [IU]/ML
INJECTION, SOLUTION SUBCUTANEOUS
COMMUNITY
Start: 2024-10-02

## 2024-10-02 RX ORDER — INSULIN GLARGINE 300 U/ML
INJECTION, SOLUTION SUBCUTANEOUS
COMMUNITY
Start: 2024-10-02

## 2024-10-02 NOTE — PROGRESS NOTES
Hugo Branham  YOB: 1965  Last upload date: Oct 2, 2024, 6:20 AM  t:slim X2 (#[id number])    2 Weeks (Sep 19 - Oct 2, 2024)  Target Glucose Range: 70 - 180 mg/dL    CGM summary    Average reading           140 mg/dL          Time in range             85%                Time CGM in use           100%               Standard deviation        38 mg/dL           Coefficient of variation  27%                GMI                       6.6%

## 2024-10-02 NOTE — PATIENT INSTRUCTIONS
Your trends are doing really well on the pump   Estimated A1C 6.6%     Since you  trends are doing well, stop metformin rx   I would continue the ozempic and jardiance since it helps diabetes but also good for heart and brain     Continue     Ozempic 1.0 mg subcutaneous once weekly   Jardiance 25mg once daily      T slim insulin pump w CIQ  w Kaleb 2 + CGM  Basal :   12MN: 1.2 u/hour  Bolus settings:   ICR  12MN  15   ISF  12MN  54   BG target, 110mg/dl       5 hour active insulin time         American Diabetes Association: blood sugar targets:     Fasting blood sugar (before breakfast) Target:    (ideally less than 110)  2 hours after eating less than 180 (ideally less than  150 )     Call for blood sugars less than  75 or greater than  200 more than 2 times in a week     If you are wearing the sensor, please look at your trends/averages    Recommendations:   GMI (estimated A1C ) target under  7%     Time in range (healthy blood sugar targets) : goal is over 70%   less than 70 : goal is less than 4%   Over 180 : goal is less than 20 %   Over 250: goal is  less than 5%     Since you are on an insulin pump to manage your diabetes AND the insulin pump contains ONLY short acting insulin, if your pump becomes disconnected or malfunctions, you must do something immediately about it to avoid severe high blood sugar (hyperglycemia) or diabetic ketoacidosis which can make you very sick or require you to be hospitalized.   Sometimes the only way to know if the insulin pump is not working is by monitoring our blood sugars (either with a glucose sensor or your meter) If you see an increase in blood sugar despite being on the pump or delivering insulin with bolus action then you will need to pay close attention to your pump and blood sugars.   Ok to Bolus again. But YOU MUST recheck your sugar within 2 hours.   If the sugar is coming down, then the insulin is being absorbed. Continue to monitor every 2 hours to be sure it  normalizes back to your baseline.   IF the sugar is HIGHER or has not decreased, you may NOT be absorbing the insulin or have a bad infusion site.   At this time, you need to take insulin with a syringe and inject the dose recommended on pump.   PLEASE change your insulin pump site immediately.  If changing your site still leads to having high blood sugars, please call the pump company to see if the pump is bad.   It is important to have a back- up plan in case your pump stops working or the infusion set comes out.   You can call us but below are your insulin pump back up plan doses:     [facility]  [address] [place] [address]   [phone]      If your infusion set comes out:  Always carry extra pump supplies with you in case of this but also carry syringes in case you need to inject insulin until you can re-insert the infusion set again     If you pumps stops working:   Test your blood sugar every 1-2 hour  You may need to take an insulin injection with vial/syringe or insulin pen: the way you gave insulin before starting on the pump    DOSES:     Long acting insulin (basal insulin):       Toujeo       24  units /day   Short acting insulin: please use the following doses for carbs that you eat :   Humalog: lyumjev   3-4 units at meals      And correction dose scale for when your blood sugar is above recommended target:    Dose 1 units for every 50 points above 200     Follow these doses until you get back on pump       If you do not have long acting insulin on hand, you will need to call the Diabetes center with a 24 hour pharmacy contact so that the on call provider can call in this insulin.   Once you resume pump, call Diabetes center for further instructions  You should have a copy of your last pump download from your last office visit that provides all of your pump settings for dosing  and programming the new pump.     Call the Diabetes center with any questions or concerns  and also contact the [phone] customer service numbers for your pump company (usually located on the back of the pump)    Severe hypoglycemia (low blood sugar)   Patient Education: Glucagon Emergency Kit    You should always have back-up glucagon in case of emergency. Glucagon is given to treat a severe low blood sugar that is not responding to eating or drinking carbohydrate,   or if you are not awake enough to safely eat or swallow.   If you are someone who is at risk for severe hypoglycemia, you should have a current (nonexpired) glucagon emergency kit on hand AND someone who knows where it is kept and how to use it.  When low blood sugar is not treated and you need someone to help you recover, it is considered a severe event.     Treating severe hypoglycemia    Glucagon is a hormone produced in the pancreas that stimulates your liver to release stored glucose into your bloodstream when your blood sugar levels are too low.   Glucagon is an emergency medicine that will raise your blood sugar if you are unable to eat or drink. It is an important part of being prepared if you have diabetes and take insulin or a medication that can cause hypoglycemia (low blood sugar).     Glucagon is available by prescription and is either injected or administered or puffed into the nostril.     The people you are in frequent contact with (for example, friends, family members and coworkers) should be instructed on how to give you glucagon to treat severe hypoglycemia.          When is the Glucagon Emergency Kit used?  Glucagon should be used to treat hypoglycemia if you are unconscious, unresponsive(meaning you cannot follow commands, even if you appear to be awake) or cannot swallow or keep any food/liquids down.    What is in the Glucagon Emergency Kit?  Your glucagon kit contains picture instructions on how to use it. Be sure to check the date on the kit periodically to make sure your kit has not .    Is glucagon  dangerous?  Glucagon is a safe drug. In fact, everyone's body contains natural glucagon to help keep blood sugar level. You need a prescription to obtain glucagon, because it is an emergency medication. There is no danger of overdose. However, it is for emergencies and should be used for emergency low blood sugars only.      What to expect after dosing:   In about 5 to 15 minutes, the person should respond and begin to come out of hypoglycemia. Sometimes a person may experience nausea or may vomit after responding to glucagon. Although initially the blood sugar may spike to a high level, it is important that the patient eat some food containing carbohydrate as soon as you are able since the injectable glucose wears off  .    What else do I need to know?  If the person does not respond within 15 minutes, call 911 immediately. It is possible that the person may be in a coma from severe hyperglycemia (very high blood sugar),rather than hypoglycemia, in which case, glucagon will not help.  Notify your diabetes provider whenever you experience a low blood sugar that requires glucagon. So you  can discuss ways to prevent severe hypoglycemia in the future.      If your blood sugar remains uncontrolled and you are symptomatic of hyperglycemia (nausea, vomiting and abdominal pain) - please go to the ER.

## 2024-10-07 ENCOUNTER — TELEPHONE (OUTPATIENT)
Age: 59
End: 2024-10-07

## 2024-10-07 NOTE — TELEPHONE ENCOUNTER
Received Diabetic Eye Exam.    10/05/2024  Eye [facility]  Dr. Becka De La Paz  No Diabetic Retinopathy      Abstracted. To provider for review.

## 2024-10-10 DIAGNOSIS — E11.65 TYPE 2 DIABETES MELLITUS WITH HYPERGLYCEMIA, WITHOUT LONG-TERM CURRENT USE OF INSULIN (HCC): ICD-10-CM

## 2024-10-10 RX ORDER — ROSUVASTATIN CALCIUM 20 MG/1
20 TABLET, COATED ORAL DAILY
Qty: 90 TABLET | Refills: 2 | Status: SHIPPED | OUTPATIENT
Start: 2024-10-10

## 2024-10-10 NOTE — TELEPHONE ENCOUNTER
Requested Prescriptions     Pending Prescriptions Disp Refills    ROSUVASTATIN 20 MG Oral Tab [Pharmacy Med Name: ROSUVASTATIN CALCIUM 20 MG TAB] 30 tablet 8     Sig: TAKE 1 TABLET BY MOUTH EVERY DAY   Last A1c value was 7.4% done 10/2/2024.  Refill 01/30/2024 Jacy TELLEZ 10/02/2024 Jacy

## 2024-10-11 DIAGNOSIS — E11.65 TYPE 2 DIABETES MELLITUS WITH HYPERGLYCEMIA, WITHOUT LONG-TERM CURRENT USE OF INSULIN (HCC): ICD-10-CM

## 2024-10-11 RX ORDER — BENAZEPRIL HYDROCHLORIDE 5 MG/1
5 TABLET ORAL DAILY
Qty: 90 TABLET | Refills: 3 | Status: SHIPPED | OUTPATIENT
Start: 2024-10-11

## 2024-10-11 NOTE — TELEPHONE ENCOUNTER
Requested Prescriptions     Pending Prescriptions Disp Refills    Benazepril HCl 5 MG Oral Tab [Pharmacy Med Name: BENAZEPRIL HCL 5 MG TABLET] 90 tablet 0     Sig: TAKE 1 TABLET (5 MG TOTAL) BY MOUTH DAILY.     HGBA1C:    Lab Results   Component Value Date    A1C 7.4 (A) 10/02/2024    A1C 7.4 (A) 06/19/2024    A1C 7.0 (A) 12/20/2023     (H) 02/26/2014     Your Appointments      Wednesday March 05, 2025 7:30 AM  Diabetes Pump follow up with BAO Salinas  [facility] [address] [facility] [address]  [facility] [id number] [phone]        Friday September 19, 2025 8:00 AM  Physical - Established with Mela Edmondson,   [facility] [address] [facility] [address]  [facility] [id number] [phone]               Last OFFICE VISIT: 10-2-2024-CB    Last refill:  1--90 tabs with 2 refills -

## 2024-10-16 LAB — TOTAL PSA: 0.4 NG/ML

## 2024-11-25 DIAGNOSIS — E11.65 TYPE 2 DIABETES MELLITUS WITH HYPERGLYCEMIA, WITHOUT LONG-TERM CURRENT USE OF INSULIN (HCC): ICD-10-CM

## 2024-11-26 RX ORDER — SEMAGLUTIDE 1.34 MG/ML
1 INJECTION, SOLUTION SUBCUTANEOUS
Refills: 2 | OUTPATIENT
Start: 2024-11-26

## 2024-11-26 RX ORDER — SEMAGLUTIDE 1.34 MG/ML
1 INJECTION, SOLUTION SUBCUTANEOUS WEEKLY
Qty: 9 ML | Refills: 0 | Status: SHIPPED | OUTPATIENT
Start: 2024-11-26

## 2024-11-26 NOTE — TELEPHONE ENCOUNTER
Requested Prescriptions     Pending Prescriptions Disp Refills    semaglutide (OZEMPIC, 1 MG/DOSE,) 4 MG/3ML Subcutaneous Solution Pen-injector 9 mL 0     Sig: Inject 1 mg into the skin once a week.     Future Appointments   Date Time Provider Department Center   3/5/2025  7:30 AM Ashley Main APRN [id number] [place]   9/19/2025  8:00 AM Mela Edmondson DO [facility] [place]     Last A1c value was 7.4% done 10/2/2024.  Refill 6/19/24 Jacy   LOV 10/02/2024 Jacy

## 2024-12-09 ENCOUNTER — PATIENT MESSAGE (OUTPATIENT)
Dept: ENDOCRINOLOGY CLINIC | Facility: CLINIC | Age: 59
End: 2024-12-09

## 2024-12-09 DIAGNOSIS — Z79.4 TYPE 2 DIABETES MELLITUS WITH HYPERGLYCEMIA, WITH LONG-TERM CURRENT USE OF INSULIN (HCC): Primary | ICD-10-CM

## 2024-12-09 DIAGNOSIS — E11.65 TYPE 2 DIABETES MELLITUS WITH HYPERGLYCEMIA, WITH LONG-TERM CURRENT USE OF INSULIN (HCC): Primary | ICD-10-CM

## 2024-12-09 NOTE — TELEPHONE ENCOUNTER
Okay to change order frequency to every 2 days.    Latonya GORE, BC[facility]ADM, [facility]

## 2024-12-09 NOTE — TELEPHONE ENCOUNTER
Patient requesting orders indicating more frequent pump setting changes.  Tslim pump, supplies direct from [employer].  Just received 90 day supply however, change every 3 days.  Okay to update orders, or offer samples when needed and can update orders at next appointment?    Future Appointments   Date Time Provider Department Center   3/5/2025  7:30 AM Ashley Main APRN [id number] [place]   9/19/2025  8:00 AM Mela Edmondson DO [facility] [place]   Last A1c value was 7.4% done 10/2/2024.

## 2024-12-11 RX ORDER — INSULIN LISPRO 100 [IU]/ML
INJECTION, SOLUTION INTRAVENOUS; SUBCUTANEOUS
Qty: 60 ML | Refills: 1 | Status: SHIPPED | OUTPATIENT
Start: 2024-12-11

## 2024-12-11 NOTE — TELEPHONE ENCOUNTER
Noted - according to [employer] report - patient currently using about 46 units of insulin daily - current prescription is pended for up to 60 units daily -pended script to pharmacy

## 2024-12-11 NOTE — TELEPHONE ENCOUNTER
Prescription sent- closing encounter at this time- will double check with Marybel to confirm site change frequency

## 2024-12-11 NOTE — TELEPHONE ENCOUNTER
Patient sent message that he was trained to fill syringe up to 1.5, if in mL patient is then filling pump with 150 units per set change (using vials, 100 units/mL). Advised to patient that using the 2 mL, would be equivalent to 200 units and fill pump completely. Agreeable to have patient fill with 200 units per change (has been filling 150 units per change), and still update frequency?    Patient also requested re-fill- pended for provider signature    Last office visit and Provider: 10/2/24 - BAO Ba  Future Appointments   Date Time Provider Department Center   3/5/2025  7:30 AM Ashley Main APRN [facility] [place]   9/19/2025  8:00 AM Mela Edmondson DO [facility] [place]   Last A1c value was 7.4% done 10/2/2024.

## 2024-12-12 NOTE — TELEPHONE ENCOUNTER
Patient should not need increased site change frequency if filling cartridge with more insulin.     Update: 9:20 am.  Sent my chart message accidentally before finishing message.  Attempted to reach patient, left message.

## 2025-02-09 DIAGNOSIS — E11.65 TYPE 2 DIABETES MELLITUS WITH HYPERGLYCEMIA, WITHOUT LONG-TERM CURRENT USE OF INSULIN (HCC): ICD-10-CM

## 2025-02-10 RX ORDER — SEMAGLUTIDE 1.34 MG/ML
1 INJECTION, SOLUTION SUBCUTANEOUS
Qty: 3 ML | Refills: 2 | Status: SHIPPED | OUTPATIENT
Start: 2025-02-10

## 2025-02-10 NOTE — TELEPHONE ENCOUNTER
Requested Prescriptions     Pending Prescriptions Disp Refills    OZEMPIC, 1 MG/DOSE, 4 MG/3ML Subcutaneous Solution Pen-injector [Pharmacy Med Name: OZEMPIC 4 MG/3 ML (1 MG/DOSE)]  2     Sig: INJECT 1MG INTO THE SKIN ONCE A WEEK     HGBA1C:    Lab Results   Component Value Date    A1C 7.4 (A) 10/02/2024    A1C 7.4 (A) 06/19/2024    A1C 7.0 (A) 12/20/2023     (H) 02/26/2014     Your Appointments      Wednesday March 05, 2025 7:30 AM  Diabetes Pump follow up with BAO Salinas  [facility] ([facility]) [address] [facility] [address]  [facility] [address]  [phone]        Friday September 19, 2025 8:00 AM  Physical - Established with Mela Edmondson,   [facility] ([facility]) [address] [facility] [address]  [facility] [address]  [phone]             Last OFFICE VISIT: 10-2-2024-CB    Last refill:  11--

## 2025-03-04 NOTE — PROGRESS NOTES
Hugo Branham is a 59 year old  presents today for diabetes management.   Primary care physician: Mela Edmondson DO   Last Diabetes appointment with me:        T slim CIQ with orville 2+ CGM      Today A1C 7.9  % ( last A1C 7.4%)      Reviewed T slim pump and Orville 2+  CGM report/trends w patient today: (full download in media)       Pump data:     Not consistently entering carbs lunch  CIQ > 3- 4 to obtain Blood sugar in target range even w carb bolus     TDD= 51 units   Based on calculations:  ISF~ 30 , ICR ~ 9            Hugo Branham  YOB: 1965  Last upload date: Mar 5, 2025, 7:00 AM  t:slim X2 (#[id number])    2 Weeks (Feb 20 - Mar 5, 2025)  Target Glucose Range: 70 - 180 mg/dL    CGM summary    Average reading           188 mg/dL          Time in range             48%                Time CGM in use           97%                Standard deviation        49 mg/dL           Coefficient of variation  26%                GMI                       7.8%                     Time in range comparison    Current 2 weeks      >  250 mg/dL       11%                181  -  250 mg/dL  41%                70  -  180 mg/dL   48%                54  -  69 mg/dL    0%                 <  54 mg/dL        0%                   Control-IQ summary    Time active      90%              12d 0 hr           CGM inactive     4%               12 hr              Control-IQ off   0%               0 hr               Pump inactive    6%               18 hr                Average sleep    Duration         5 hr               Weekly           7 times              Average exercise    Duration         0 hr               Weekly           0 times                  Insulin summary    Average daily dose     50.45 units        Basal                  73%              36.74 units        Bolus                  27%              13.70 units        Average daily boluses  13 boluses         Manual                 18%              2 boluses           Control-IQ             82%              10 boluses         Average daily carbs    87g                      Bolus review    Type               Food             42%              5.77 units         Correction       12%              1.71 units         Override         0%               0.00 units         Control-IQ       45%              6.22 units           Delivery Method    Standard         55%              7.48 units         Extended         0%               0.00 units         Quick            0%               0.00 units         Control-IQ       45%              6.22 units               Load activity    Cartridge change  every 3.6d         Tubing fill       every 3.6d         Cannula fill      every 3.5d              Diabetes History:  Type 2 DM ~ 2011   Patient has not had hospitalizations for blood sugar issues  denies any history of pancreatitis      2011 Papillary thyroid cancer: seeing Dr Anna , endocrinology annually       Previous DM therapies:  Glipizide: change in therapy   Victoza: /Farxiga :  formulary     Current DM Regimen:     Ozempic 1.0 mg subcutaneous once weekly   Jardiance 25mg once daily    Metformin 1000mg twice daily   T slim insulin pump w CIQ  w Kaleb 2 + CGM  Humalog   Basal :   12MN: 1.2 u/hour  Bolus settings:   ICR  12MN  15   ISF  12MN  54   BG target: 110mg/dl       5 hour active insulin time       Pump back up: Toujeo solstar: 24 units once daily     HGBA1C:    Lab Results   Component Value Date    A1C 7.9 (A) 03/05/2025    A1C 7.4 (A) 10/02/2024    A1C 7.4 (A) 06/19/2024     (H) 02/26/2014       Lab Results   Component Value Date    CHOLEST 110 06/22/2024    CHOLEST TNP 06/19/2024    TRIG 102 06/22/2024    TRIG TNP 06/19/2024    HDL 40 06/22/2024    HDL TNP 06/19/2024    LDL 51 06/22/2024    LDL TNP 06/19/2024     Component      Latest Ref Rng 6/22/2024   MICROALBUMIN/CREATININE RATIO, RANDOM URINE      <30 mg/g creat 14        Lab Results   Component Value Date     MICROALBCREA  01/11/2023      Comment:      Unable to calculate due to Urine Creatinine <13.00 mg/dL        Lab Results   Component Value Date    CREATSERUM 0.80 06/22/2024    CREATSERUM 0.93 08/12/2023    EGFRCR 103 06/22/2024    EGFRCR 96 08/12/2023     Lab Results   Component Value Date    AST 15 06/22/2024    AST 17 08/12/2023    ALT 17 06/22/2024    ALT 20 08/12/2023       Lab Results   Component Value Date    TSH 1.130 05/28/2019    TSH 0.731 05/12/2018    T4F 1.7 08/12/2023       Lab Results   Component Value Date    ZNT8 <10 06/22/2024        Component      Latest Ref Rng & Units 9/2/2021   GLUTAMIC ACID$DECARBOXYLASE 65 AB      <5 IU/ml <5   ISLET CELL ANTIBODY SCREEN      neg neg       DM Complications:  Microvascular:   Neuropathy: no  Retinopathy: no  Nephropathy: no    Macrovascular:  PVD: no  CAD: no  Stroke/CVA: no    Modifying factors:  Medication adherence: yes   Recent steroids, illness or infections: ( past 90d) no     Allergies: Adhesive tape and Neosporin [neomycin-bacitracin-polymyxin]    Past Medical History:    Agatston coronary artery calcium score between 200 and 399    2/24/2022 CT calcium scoring      Asymptomatic varicose veins    Balanitis    Likely candidal secondary to diabetes     Cardiac calcification (HCC)    2/24/2022 CT calcium scoring  FINDINGS:  CALCIUM SCORING RESULTS (Volume / Agatston):   LEFT MAIN:    0.00 / 0.00   RCA:      131.61 / 149.01   LAD:      81.54 / 97.51   CIRCUMFLEX:    14.31 / 9.54   PDA:      0.00 / 0.00    TOTAL:      227.45 / 256.06       Coronary artery calcification of native artery    2/24/2022 CT calcium scoring      Disorder of liver    FATTY LIVER    ED (erectile dysfunction)    Esophageal reflux    Essential hypertension, benign    Gastroesophageal reflux disease with esophagitis    Heartburn    Hx of papillary thyroid carcinoma    Hyperlipidemia LDL goal < 100    Low HDL (under 40)    Mass of subcutaneous tissue    Mixed hyperlipidemia    Other  specified visual disturbances    Papillary thyroid carcinoma (HCC)    Dx in 12/2011: Papillary thyroid carcinoma measuring 0.9 cm in the right lobe - T1NxMx - Stage I    Polyp of colon    Postsurgical hypothyroidism    Dx in 12/2011 - total thyroidectomy for thyroid cancer    Sebaceous cyst    Multiple of back      Type 2 diabetes mellitus with hyperglycemia, with long-term current use of insulin (HCC)    Type 2 diabetes mellitus with hyperglycemia, without long-term current use of insulin (HCC)    Type 2 diabetes mellitus with hyperlipidemia (HCC)    Type II or unspecified type diabetes mellitus without mention of complication, not stated as uncontrolled    Vitamin D deficiency    Dx in 2/2013     Past Surgical History:   Procedure Laterality Date    Colonoscopy  03/13/2020    Dr Osei at [facility]    Other surgical history      Surgery for veins in the legs    Thyroidectomy      On 12/19/2011: Total thyroidectomy for thyroid cancer by Dr. Estrada     Social History     Socioeconomic History    Marital status:     Number of children: 2   Occupational History    Occupation: Operations Mgmt   Tobacco Use    Smoking status: Never    Smokeless tobacco: Never   Vaping Use    Vaping status: Never Used   Substance and Sexual Activity    Alcohol use: Yes     Alcohol/week: 2.0 - 3.0 standard drinks of alcohol     Types: 1 Glasses of wine, 1 - 2 Cans of beer per week     Comment: OCC.    Drug use: No   Other Topics Concern    Caffeine Concern Yes     Comment: 3 cups of coffee per day    Stress Concern No    Weight Concern No    Special Diet No    Exercise Yes     Comment: twice a week    Seat Belt Yes     Family History   Problem Relation Age of Onset    Heart Disease Maternal Aunt     Cancer Maternal Uncle     Stroke Neg     Thyroid Disorder Neg     Diabetes Neg      Current Medication List:   Current Outpatient Medications   Medication Sig Dispense Refill    insulin lispro (HUMALOG) 100 UNIT/ML Injection  Solution Uses up to 80 units daily via insulin pump 70 mL 1    JARDIANCE 25 MG Oral Tab Take 1 tablet (25 mg total) by mouth every morning. 90 tablet 1    semaglutide (OZEMPIC, 1 MG/DOSE,) 4 MG/3ML Subcutaneous Solution Pen-injector INJECT 1MG INTO THE SKIN ONCE A WEEK 3 mL 2    Benazepril HCl 5 MG Oral Tab TAKE 1 TABLET (5 MG TOTAL) BY MOUTH DAILY. 90 tablet 3    rosuvastatin 20 MG Oral Tab Take 1 tablet (20 mg total) by mouth daily. 90 tablet 2    pantoprazole 40 MG Oral Tab EC TAKE 1 TABLET BY MOUTH EVERY DAY 30 MINUTES BEFORE BREAKFAST 30 tablet 3    Omega-3 1000 MG Oral Cap Take 1,000 mg by mouth daily. Take 2 dab daily      Levothyroxine Sodium 137 MCG Oral Tab Take 137 mcg by mouth every morning before breakfast. 90 tablet 3    CINNAMON OR Take 1 tablet by mouth daily.      Coenzyme Q10 (CO Q-10) 100 MG Oral Cap 200 mg. Take 1 tablet daily         Insulin Glargine, 1 Unit Dial, (TOUJEO SOLOSTAR) 300 UNIT/ML Subcutaneous Solution Pen-injector 24 units daily when OFF Pump      glucagon (GVOKE HYPOPEN 2-PACK) 1 MG/0.2ML Subcutaneous injection Inject 0.2 mL (1 mg total) into the skin as needed. 0.4 mL 1    acetaminophen 500 MG Oral Tab Take 1 tablet (500 mg total) by mouth every 6 (six) hours as needed for Pain.      Insulin Pen Needle (BD PEN NEEDLE GARCIA 2ND GEN) 32G X 4 MM Does not apply Misc Up to 4 injections daily 300 each 3    Barberry-Oreg Grape-Goldenseal (BERBERINE COMPLEX OR) Take by mouth.      ASHWAGANDHA OR Take 1,000 mg by mouth daily.      diphenhydrAMINE HCl 25 MG Oral Tab Take 1 tablet (25 mg total) by mouth every 6 (six) hours as needed for Itching.         DM associated review of  symptoms:   Endocrine: Polyuria, polyphagia, polydipsia: no  Neurological: Paresthesias: no  HEENT: Blurred vision: no.   Skin: no rash or wounds  Hematological: Hypoglycemia: no    Review of Systems     LUNGS: denies shortness of breath   CARDIOVASCULAR: denies chest pain  GI: denies abdominal pain, nausea or  diarrhea . + GERD sxs   : denies dysuria          Physical exam:  /72   Pulse 84   Resp 16   Wt 197 lb 6.4 oz (89.5 kg)   SpO2 96%   BMI 29.24 kg/m²   Body mass index is 29.24 kg/m².    Physical Exam   Vitals reviewed.  Constitutional: Normal appearance   Cardiovascular: Normal rate ,normal rhythm   Pulmonary/Chest: Effort normal  Neurological: Alert and oriented .   Psychiatric: Normal mood and affect.   Musculoskeletal:  Diabetes foot exam:   Good foot hygiene.   Bilateral barefoot skin diabetic exam: normal.  Visualized feet and the appearance : normal.  Bilateral monofilament/sensation of both feet is normal. Vibration to dorsum to the first toe perceived.   Bilateral 2+ pedal pulse pedal pulse exam     Assessment/Plan:      Mixed hyperlipidemia  Last labs 7.2024 --> update levels   Continue  Rosuvastatin and Omega 3 FA         Type 2 diabetes mellitus with hyperglycemia, without long-term current use of insulin ([facility])  A1C  7.9  % (last A1C 7.2%)   Weight: 182   lb (last weight: 182  lb )   Diabetes control is improving but needs ongoing management and evaluation  given the chronicity of Diabetes, ongoing medication monitoring and risk for complications     Reminded patient health impact associated with improved glycemic targets       Continue:     Jardiance 25mg once daily    Ozempic 1mg subcutaneous once weekly       T slim insulin pump w CIQ  w Kaleb 2 + CGM--> Humalog   Basal :   12MN: 1.2 u/hour  Bolus settings:   ICR  12MN  15 --> 10   ~continue entering set carbs: 15 gm snack, 30 gm small meal, 45 gm standard/large meal     ISF  12MN  54 --> 45   BG target: 110mg/dl (CIQ default)   5 hour active insulin time (CIQ default)     Pump back up: Toujeo solstar: 24 units once daily     Discussed pump back up     Reminded about site rotation for subcutaneous injections     Reviewed clinical significance of A1c, adverse effects of suboptimal glucose control, and goals of therapy   Reviewed the A1C  test, what the value reflects and the goal for the patient.   Reminded pt on A1C and blood sugar targets (Fasting < 130 and post prandial <180 ) and complications associated with hyperglycemia and uncontrolled DM (on AVS)   Recommended SMBG 3- x daily if not using CGM   Reviewed s/s and treatment of hypoglycemia (on AVS)   GVOKE rx.(2024)  For severe hypoglycemia emergency   Continue with lifestyle modifications since they have positive impact on diabetes/blood sugars/health (portion control, physical activity, weight loss)   Reinforced timing and adherence with medication, self-monitoring of blood glucose and routine follow up.   Urine m/alb collected today    Cardiac CT  calcium ordered today   The patient is asked to return in 4  m  but recommended to contact DM clinic sooner if questions or concerns.    The patient indicates understanding of these issues and agrees to the plan.      Orders Placed This Encounter    Microalb/Creat Ratio, Random Urine     Order Specific Question:   Release to patient     Answer:   Immediate    POC Hemoglobin A1C     Order Specific Question:   Release to patient     Answer:   Immediate    Comp Metabolic Panel (14)    Lipid Panel    Interpretation code 72 hour glucose monitor    insulin lispro (HUMALOG) 100 UNIT/ML Injection Solution     Sig: Uses up to 80 units daily via insulin pump     Dispense:  70 mL     Refill:  1    JARDIANCE 25 MG Oral Tab     Sig: Take 1 tablet (25 mg total) by mouth every morning.     Dispense:  90 tablet     Refill:  1     Diabetes complications & risks surveillance:   A1C/Blood pressure: as reported    Last dilated eye exam: Last Dilated Eye Exam: 10/05/24   Exam shows retinopathy? Eye Exam shows Diabetic Retinopathy?: No  Last diabetic foot exam: Last Foot Exam: 03/05/25  Nephropathy screening:  No indication for  ace /arb rx.    Lab Results   Component Value Date    EGFRCR 103 06/22/2024    MICROALBCREA  01/11/2023      Comment:      Unable to calculate  due to Urine Creatinine <13.00 mg/dL       LIPID screening:    Lab Results   Component Value Date    CHOLEST 110 06/22/2024    LDL 51 06/22/2024    TRIG 102 06/22/2024    HDL 40 06/22/2024     Cholesterol Lowering Medications            rosuvastatin 20 MG Oral Tab             The risks and benefits of my recommendations, as well as other treatment options were discussed with the patient today. questions were also answered to the best of my knowledge.

## 2025-03-05 ENCOUNTER — PATIENT MESSAGE (OUTPATIENT)
Dept: ENDOCRINOLOGY CLINIC | Facility: CLINIC | Age: 60
End: 2025-03-05

## 2025-03-05 ENCOUNTER — OFFICE VISIT (OUTPATIENT)
Dept: ENDOCRINOLOGY CLINIC | Facility: CLINIC | Age: 60
End: 2025-03-05
Payer: COMMERCIAL

## 2025-03-05 VITALS
SYSTOLIC BLOOD PRESSURE: 134 MMHG | BODY MASS INDEX: 29 KG/M2 | HEART RATE: 84 BPM | OXYGEN SATURATION: 96 % | DIASTOLIC BLOOD PRESSURE: 72 MMHG | RESPIRATION RATE: 16 BRPM | WEIGHT: 197.38 LBS

## 2025-03-05 DIAGNOSIS — Z96.41 INSULIN PUMP IN PLACE: ICD-10-CM

## 2025-03-05 DIAGNOSIS — Z79.4 TYPE 2 DIABETES MELLITUS WITH HYPERGLYCEMIA, WITH LONG-TERM CURRENT USE OF INSULIN (HCC): Primary | ICD-10-CM

## 2025-03-05 DIAGNOSIS — E78.5 DYSLIPIDEMIA: ICD-10-CM

## 2025-03-05 DIAGNOSIS — E11.65 TYPE 2 DIABETES MELLITUS WITH HYPERGLYCEMIA, WITH LONG-TERM CURRENT USE OF INSULIN (HCC): Primary | ICD-10-CM

## 2025-03-05 LAB
AMB EXT CREATININE, URINE: 54 MG/DL (ref 20–320)
AMB EXT MALB URINE CALC: 3 MG/24HR
AMB EXT MALB/CRE CALC: 50 UG/MG
HEMOGLOBIN A1C: 7.9 % (ref 4.3–5.6)

## 2025-03-05 PROCEDURE — 99214 OFFICE O/P EST MOD 30 MIN: CPT | Performed by: NURSE PRACTITIONER

## 2025-03-05 PROCEDURE — 95251 CONT GLUC MNTR ANALYSIS I&R: CPT | Performed by: NURSE PRACTITIONER

## 2025-03-05 PROCEDURE — 83036 HEMOGLOBIN GLYCOSYLATED A1C: CPT | Performed by: NURSE PRACTITIONER

## 2025-03-05 RX ORDER — EMPAGLIFLOZIN 25 MG/1
25 TABLET, FILM COATED ORAL EVERY MORNING
Qty: 90 TABLET | Refills: 1 | Status: SHIPPED | OUTPATIENT
Start: 2025-03-05

## 2025-03-05 RX ORDER — INSULIN LISPRO 100 [IU]/ML
INJECTION, SOLUTION INTRAVENOUS; SUBCUTANEOUS
Qty: 70 ML | Refills: 1 | Status: SHIPPED | OUTPATIENT
Start: 2025-03-05

## 2025-03-05 NOTE — PATIENT INSTRUCTIONS
We have made some pump changes today to see if your trends stay in healthier targets more often     A1C 7.9%         Continue:     Jardiance 25mg once daily    Ozempic 1mg subcutaneous once weekly       T slim insulin pump w CIQ  w Kaleb 2 + CGM--> Humalog       American Diabetes Association: blood sugar targets:     Fasting blood sugar (before breakfast) Target:    (ideally less than 110)  2 hours after eating less than 180 (ideally less than  150 )     Call for blood sugars less than  75 or greater than  200 more than 2 times in a week     If you are wearing the sensor, please look at your trends/averages    Recommendations:   GMI (estimated A1C ) target under  7%     Time in range (healthy blood sugar targets) : goal is over 70%   less than 70 : goal is less than 4%   Over 180 : goal is less than 20 %   Over 250: goal is  less than 5%     Watch for low blood sugars: (less than 70 )          Treatment of Low Blood Glucose Action Plan  1. Check blood glucose to be sure that it is low. You cannot  always go by symptoms or how you feel. If in doubt, treat your low blood glucose anyway.  Rule of 15 :     2. Take 15 grams of carbohydrate (carb). Here are some choices:    4 oz. regular fruit juice  3-4 glucose tablets  6 oz. regular soda   7-8 jelly beans    3. Recheck blood glucose after 10-15 minutes. If blood glucose is still low (less than 70 mg/dl) repeat the treatment (step 2).    4. If your next meal is more than one hour away, eat a small snack.    5. If you’re not sure what caused your low blood glucose, call your healthcare provider.    6. Always check your blood glucose before you drive       To treat a low, I recommend you carry with you easy, pre-portioned treatment for low blood sugars that are 15G of carbs:   - Children sized squeeze pouch applesauce (high fiber + carbs help prevent too high of a spike)  - Small children's sized juicebox- 15g carb --> 4oz juice box  - Glucose tablets from  [facility], you can find them near diabetes supplies --> Note, you will need to eat 3-4 tablets to get to 15g of carbs  - Children sized fruit snack pack- look for one with 15 grams of total carbohydrate    AVOID complex carbs, or foods that contain fats along with carbs (like chocolate) can slow the absorption of glucose and should NOT be used to treat an emergency low

## 2025-03-06 DIAGNOSIS — Z79.4 TYPE 2 DIABETES MELLITUS WITH HYPERGLYCEMIA, WITH LONG-TERM CURRENT USE OF INSULIN (HCC): Primary | ICD-10-CM

## 2025-03-06 DIAGNOSIS — E11.65 TYPE 2 DIABETES MELLITUS WITH HYPERGLYCEMIA, WITH LONG-TERM CURRENT USE OF INSULIN (HCC): Primary | ICD-10-CM

## 2025-03-06 LAB
CREATININE, RANDOM URINE: 54 MG/DL (ref 20–320)
MICROALBUMIN/CREATININE RATIO, RANDOM URINE: 50 MG/G CREAT
MICROALBUMIN: 2.7 MG/DL

## 2025-03-07 ENCOUNTER — TELEPHONE (OUTPATIENT)
Facility: CLINIC | Age: 60
End: 2025-03-07

## 2025-03-07 NOTE — TELEPHONE ENCOUNTER
Incoming fax from [facility] regarding requested lab results    Abstracted and given to provider inbasket for review

## 2025-03-11 LAB
ALBUMIN/GLOBULIN RATIO: 1.4 (CALC) (ref 1–2.5)
ALBUMIN: 4.4 G/DL (ref 3.6–5.1)
ALKALINE PHOSPHATASE: 74 U/L (ref 35–144)
ALT: 21 U/L (ref 9–46)
AST: 17 U/L (ref 10–35)
BILIRUBIN, TOTAL: 0.7 MG/DL (ref 0.2–1.2)
BUN: 17 MG/DL (ref 7–25)
CALCIUM: 9.2 MG/DL (ref 8.6–10.3)
CARBON DIOXIDE: 25 MMOL/L (ref 20–32)
CHLORIDE: 103 MMOL/L (ref 98–110)
CHOL/HDLC RATIO: 3.4 (CALC)
CHOLESTEROL, TOTAL: 162 MG/DL
CREATININE, RANDOM URINE: 87 MG/DL (ref 20–320)
CREATININE: 0.89 MG/DL (ref 0.7–1.3)
EGFR: 99 ML/MIN/1.73M2
GLOBULIN: 3.2 G/DL (CALC) (ref 1.9–3.7)
GLUCOSE: 145 MG/DL (ref 65–99)
HDL CHOLESTEROL: 48 MG/DL
LDL-CHOLESTEROL: 87 MG/DL (CALC)
MICROALBUMIN/CREATININE RATIO, RANDOM URINE: 41 MG/G CREAT
MICROALBUMIN: 3.6 MG/DL
NON-HDL CHOLESTEROL: 114 MG/DL (CALC)
POTASSIUM: 4.1 MMOL/L (ref 3.5–5.3)
PROTEIN, TOTAL: 7.6 G/DL (ref 6.1–8.1)
SODIUM: 139 MMOL/L (ref 135–146)
TRIGLYCERIDES: 167 MG/DL

## 2025-03-12 NOTE — TELEPHONE ENCOUNTER
Sample set aside for patient - discussing with Franca patient to come for nurse visit in morning   Future Appointments   Date Time Provider Department Center   3/13/2025  8:15 AM [facility]   7/8/2025  7:30 AM Ashley Main APRN [facility] [place]   9/19/2025  8:00 AM Mela Edmondson DO [facility] [place]

## 2025-03-12 NOTE — TELEPHONE ENCOUNTER
Confirmed available supplies in [place] office- call placed to patient to discuss - patient advised he can come to [place] office around 7 am tomorrow - advised will set aside [employer] xc infusion sets for patient to  (patient did not recognize the names) - can leave at front with staff for patient to

## 2025-03-12 NOTE — TELEPHONE ENCOUNTER
Patient sent My Chart Message - states exhausted supply of sites - alternatives to use in the interim? Confirming with patient if he means sites to place supplies or needing the actual supplies themselves. Checking supply of [employer] pump supplies -also sending email to [employer] rep to make sure they have everything from our office at this time related to patient's order.    Call placed to patient to discuss further - Left message to call back office or respond to My Chart message- no identifiers in voicemail- what infusion sites does patient use, has insulin pens, etc. - see My Chart Message     Patient called office back - advised he had reached out to [employer] on Monday for his re-order of supplies- it was delayed advised he would not be able to stop by [place] office today- possibly [place] office - advised will confirm on supply in that office - did not know name of infusion sets, unable to find in chart    Patient confirmed he has long-acting and short-acting pens and is willing to use them, just wants to confirm his dosing. Advised will confirm with provider and update him either via phone or My Chart Message - not able to send a text message. Per last visit note, saw Toujeo 24 units daily and pump settings     Asked patient about trends and stated just a little bit elevated than usual - was able to pull and email [employer] report to provider.     Please confirm - recommendations for pump back up plan     Toujeo solstar: 24 units once daily     Humalog at meal times:   ICR  12MN 10   ISF  12MN  45   BG target: 110mg/dl

## 2025-03-13 NOTE — TELEPHONE ENCOUNTER
Rescheduled patient appt  Your appointments       Date & Time Appointment Department ([place])    Mar 14, 2025 8:15 AM CDT Nurse Visit with [facility] DI[facility] N[facility] ([facility] [place])        Jul 08, 2025 7:30 AM CDT Diabetes Pump follow up with Ashley Main APRN [facility] ([facility])        Sep 19, 2025 8:00 AM CDT Physical - Established with Mela Edmondson DO [facility] ([facility])              [facility]  [facility] [place]  [address]  [facility] [address]  [phone] [facility]  [address]  [facility] [id number] [phone] [facility]  [address]  [facility] [id number] [phone]

## 2025-03-14 ENCOUNTER — NURSE ONLY (OUTPATIENT)
Facility: CLINIC | Age: 60
End: 2025-03-14
Payer: COMMERCIAL

## 2025-03-26 ENCOUNTER — TELEPHONE (OUTPATIENT)
Dept: ENDOCRINOLOGY CLINIC | Facility: CLINIC | Age: 60
End: 2025-03-26

## 2025-03-26 NOTE — TELEPHONE ENCOUNTER
Patient called office back - discussed settings change, correction factor - walked patient through settings change - advised on carbs to be entered, patient repeated adjustments back - advised no note to change carbs at lunch.    Patient asked how much more insulin he should put in to pump, he usually fills up reservoir with 2 mL about every 3 days. Patient was wondering how much more he should fill up reservoir at pump changes, since he will be using more insulin. Advised would likely need to add more, not sure what would be best amount to keep site changes closer together. Changed site this morning, alarmed at 2 am, patient due for change at 6 am.     Advised patient can confirm with BAO Ba and let him know. Patient agreed, can leave voicemail or send My Chart Message with recommendation.

## 2025-03-26 NOTE — TELEPHONE ENCOUNTER
Reviewed pump upload and dexcom   Since March 15th, consistently on pump   Appears to need more insulin for food and corrections  10 d average  mg/dl and TIR only 56%     Please have patient increase Breakfast from 30--> 45 gm carbohydrates and dinner 45--> 60 gm carbohydrates  Change ISF 45--> 40

## 2025-03-27 NOTE — TELEPHONE ENCOUNTER
These changes should not really impact using more than 200 units as he is filling  If patient finds he is having to change more often, then he can increase cartridge as indicated   176.53

## 2025-03-27 NOTE — TELEPHONE ENCOUNTER
Call with patient, states he ran out of insulin in cartridge about 4 hours early, prior to insulin adjustments.  Reviewed he can try filling with 2.5 ml with next change and see how it works, then adjust accordingly.  Patient will contact office with further questions or concerns.

## 2025-04-28 DIAGNOSIS — E11.65 TYPE 2 DIABETES MELLITUS WITH HYPERGLYCEMIA, WITHOUT LONG-TERM CURRENT USE OF INSULIN (HCC): ICD-10-CM

## 2025-04-29 RX ORDER — SEMAGLUTIDE 1.34 MG/ML
1 INJECTION, SOLUTION SUBCUTANEOUS WEEKLY
Qty: 3 ML | Refills: 2 | Status: SHIPPED | OUTPATIENT
Start: 2025-04-29

## 2025-04-29 NOTE — TELEPHONE ENCOUNTER
Requested Prescriptions     Pending Prescriptions Disp Refills    OZEMPIC, 1 MG/DOSE, 4 MG/3ML Subcutaneous Solution Pen-injector [Pharmacy Med Name: OZEMPIC 4 MG/3 ML (1 MG/DOSE)]  2     Sig: INJECT 1 MG INTO THE SKIN ONE TIME PER WEEK     Future Appointments   Date Time Provider Department Center   7/8/2025  7:30 AM Ashley Main APRN [facility] [place]   9/19/2025  8:00 AM Mela Edmondson DO [facility] [place]     Last A1c value was 7.9% done 3/5/2025.  Refill 02/10/25 Jacy   LOV 03/05/25 Jacy

## 2025-06-05 ENCOUNTER — TELEPHONE (OUTPATIENT)
Facility: CLINIC | Age: 60
End: 2025-06-05

## 2025-06-05 NOTE — TELEPHONE ENCOUNTER
Received fax from [employer] for OFFICE VISIT notes to be faxed in faxed via [employer] [phone]     LOV 03/2025    Future Appointments   Date Time Provider Department Center   7/8/2025  7:30 AM Ashley Main APRN [facility] [place]   9/19/2025  8:00 AM Mela Edmondson DO [facility] [place]

## 2025-06-21 DIAGNOSIS — E11.65 TYPE 2 DIABETES MELLITUS WITH HYPERGLYCEMIA, WITHOUT LONG-TERM CURRENT USE OF INSULIN (HCC): ICD-10-CM

## 2025-06-23 RX ORDER — ROSUVASTATIN CALCIUM 20 MG/1
20 TABLET, COATED ORAL DAILY
Qty: 90 TABLET | Refills: 3 | Status: SHIPPED | OUTPATIENT
Start: 2025-06-23

## 2025-06-23 NOTE — TELEPHONE ENCOUNTER
Requested Prescriptions     Pending Prescriptions Disp Refills    ROSUVASTATIN 20 MG Oral Tab [Pharmacy Med Name: ROSUVASTATIN CALCIUM 20 MG TAB] 30 tablet 8     Sig: TAKE 1 TABLET BY MOUTH EVERY DAY     Future Appointments   Date Time Provider Department Center   7/8/2025  7:30 AM Ashley Main APRN [facility] [place]   9/19/2025  8:00 AM Mela Edmondson DO [facility] [place]     Last A1c value was 7.9% done 3/5/2025.  Refill 10/10/24 Jacy   LOV 03/05/25 Jacy

## 2025-07-08 ENCOUNTER — TELEPHONE (OUTPATIENT)
Dept: ENDOCRINOLOGY CLINIC | Facility: CLINIC | Age: 60
End: 2025-07-08

## 2025-08-06 DIAGNOSIS — E11.65 TYPE 2 DIABETES MELLITUS WITH HYPERGLYCEMIA, WITHOUT LONG-TERM CURRENT USE OF INSULIN (HCC): ICD-10-CM

## 2025-08-07 RX ORDER — SEMAGLUTIDE 1.34 MG/ML
1 INJECTION, SOLUTION SUBCUTANEOUS
Qty: 3 ML | Refills: 0 | Status: SHIPPED | OUTPATIENT
Start: 2025-08-07 | End: 2025-08-11

## 2025-08-11 ENCOUNTER — OFFICE VISIT (OUTPATIENT)
Facility: CLINIC | Age: 60
End: 2025-08-11

## 2025-08-11 VITALS
RESPIRATION RATE: 16 BRPM | DIASTOLIC BLOOD PRESSURE: 70 MMHG | OXYGEN SATURATION: 90 % | SYSTOLIC BLOOD PRESSURE: 128 MMHG | HEIGHT: 69 IN | WEIGHT: 200 LBS | BODY MASS INDEX: 29.62 KG/M2 | HEART RATE: 98 BPM

## 2025-08-11 DIAGNOSIS — Z96.41 INSULIN PUMP IN PLACE: ICD-10-CM

## 2025-08-11 DIAGNOSIS — E11.65 TYPE 2 DIABETES MELLITUS WITH HYPERGLYCEMIA, WITH LONG-TERM CURRENT USE OF INSULIN (HCC): Primary | ICD-10-CM

## 2025-08-11 DIAGNOSIS — Z79.4 TYPE 2 DIABETES MELLITUS WITH HYPERGLYCEMIA, WITH LONG-TERM CURRENT USE OF INSULIN (HCC): Primary | ICD-10-CM

## 2025-08-11 DIAGNOSIS — E89.0 POSTSURGICAL HYPOTHYROIDISM: ICD-10-CM

## 2025-08-11 DIAGNOSIS — E78.5 DYSLIPIDEMIA: ICD-10-CM

## 2025-08-11 LAB — HEMOGLOBIN A1C: 8 % (ref 4.3–5.6)

## 2025-08-11 RX ORDER — SEMAGLUTIDE 1.34 MG/ML
1 INJECTION, SOLUTION SUBCUTANEOUS WEEKLY
Qty: 9 ML | Refills: 1 | Status: SHIPPED | OUTPATIENT
Start: 2025-08-11

## 2025-08-11 RX ORDER — INSULIN LISPRO 100 [IU]/ML
INJECTION, SOLUTION INTRAVENOUS; SUBCUTANEOUS
Qty: 90 ML | Refills: 1 | Status: SHIPPED | OUTPATIENT
Start: 2025-08-11

## 2025-08-11 RX ORDER — LEVOTHYROXINE SODIUM 150 UG/1
150 TABLET ORAL
COMMUNITY
Start: 2025-07-19

## 2025-08-26 DIAGNOSIS — E11.65 TYPE 2 DIABETES MELLITUS WITH HYPERGLYCEMIA (HCC): ICD-10-CM

## 2025-08-26 RX ORDER — EMPAGLIFLOZIN 25 MG/1
25 TABLET, FILM COATED ORAL EVERY MORNING
Qty: 30 TABLET | Refills: 5 | Status: SHIPPED | OUTPATIENT
Start: 2025-08-26

## (undated) DIAGNOSIS — Z79.899 OTHER LONG TERM (CURRENT) DRUG THERAPY: ICD-10-CM

## (undated) DIAGNOSIS — E11.65 TYPE 2 DIABETES MELLITUS WITH HYPERGLYCEMIA (HCC): ICD-10-CM

## (undated) DIAGNOSIS — E11.65 TYPE 2 DIABETES MELLITUS WITH HYPERGLYCEMIA, WITHOUT LONG-TERM CURRENT USE OF INSULIN (HCC): ICD-10-CM

## (undated) DEVICE — 3M™ TEGADERM™ TRANSPARENT FILM DRESSING FRAME STYLE, 1626W, 4 IN X 4-3/4 IN (10 CM X 12 CM), 50/CT 4CT/CASE: Brand: 3M™ TEGADERM™

## (undated) DEVICE — SOLUTION IRRIG 1000ML 0.9% NACL USP BTL

## (undated) DEVICE — ANTIBACTERIAL UNDYED BRAIDED (POLYGLACTIN 910), SYNTHETIC ABSORBABLE SUTURE: Brand: COATED VICRYL

## (undated) DEVICE — SUT MCRYL 4-0 18IN PS-2 ABSRB UD 19MM 3/8 CIR

## (undated) DEVICE — MINI LAP PACK-LF: Brand: MEDLINE INDUSTRIES, INC.

## (undated) DEVICE — GLOVE SUR 7.5 SENSICARE PI PIP CRM PWD F

## (undated) DEVICE — SLEEVE COMPR MD KNEE LEN SGL USE KENDALL SCD

## (undated) DEVICE — GAUZE,SPONGE,4"X4",12PLY,STERILE,LF,2'S: Brand: MEDLINE

## (undated) NOTE — LETTER
7/8/2025    Hugo Branham  [address] [place] [address]    Dear Hugo,    Due to a missed appointment on 07/08/2025, your account has been charged $50.    No-show policy    A $50 fee will be charged for missed appointments.    To accommodate all our patients, we require at least 24 hours' notice if you need to cancel or reschedule your appointment.    If three appointments are missed within a 12-month period, we may begin the dismissal process for future care at [facility].    We value the relationship we have established with you.  We hope to continue to serve your health care needs.      Sincerely,    [facility]

## (undated) NOTE — Clinical Note
Patient seen for tslim f/u, doing well but not bolusing for food. No settings changed as he had decent control even without bolusing. We reviewed in detail how to bolus for food. He demonstrated understanding. Sees you next week.

## (undated) NOTE — LETTER
7/8/2025    Hugo Branham  [address] [place] [address]    Dear Hugo,    Due to a missed appointment on 07/08/2025, your account has been charged $50.    No-show policy    A $50 fee will be charged for missed appointments.    To accommodate all our patients, we require at least 24 hours' notice if you need to cancel or reschedule your appointment.    If three appointments are missed within a 12-month period, we may begin the dismissal process for future care at [facility].    We value the relationship we have established with you.  We hope to continue to serve your health care needs.      Sincerely,    [facility]

## (undated) NOTE — LETTER
06/09/18        Kj Gomez     In an effort to provide you with the best possible care for your diabetes, we ask that you please schedule the following appointment(s) as indicated below.   Our records indicate that you are in need of the following:

## (undated) NOTE — MR AVS SNAPSHOT
[facility]  [phone]               Thank you for choosing us for your health care visit with Yolanda Mondragon DO.   We are glad to serve you and happy to provide you with this s If you are confident that your benefit plan will not require a referral or authorization, such as [place] Medicaid, please feel free to schedule your appointment immediately.  However, if you are unsure about the requirements for authorization, please wa Generic drug:  Rosuvastatin Calcium   Take 1 tablet (20 mg total) by mouth nightly. Empagliflozin 25 MG Tabs   Take 25 tablets by mouth every morning. Commonly known as:  JARDIANCE           FISH OIL OR   Take by mouth daily.            Insulin discharge instructions in [facility] by going to Visits < Admission Summaries. If you've been to the Emergency Department or your doctor's office, you can view your past visit information in [facility] by going to Visits < Visit Summaries. Tonara questions?

## (undated) NOTE — LETTER
09/14/18    Dear: Barron Randall      In an effort to provide you with the best possible care for your diabetes, we ask that you please schedule the following appointment(s) as indicated below.   Our records indicate that you are in need of the following:

## (undated) NOTE — LETTER
01/03/18        Gentry Caicedo     In an effort to provide you with the best possible care for your diabetes, we ask that you please schedule the following appointment(s) as indicated below.   Our records indicate that you are in need of the following:

## (undated) NOTE — Clinical Note
I had the pleasure of seeing Hugo DEMPSEY Yonas on 2/5/2024.  Please see my attached note.  Saloni Jacob MD FACS [facility]

## (undated) NOTE — LETTER
OUTSIDE TESTING RESULT REQUEST     IMPORTANT: FOR YOUR IMMEDIATE ATTENTION  Please FAX all test results listed below to: [phone]       * * * * If testing is NOT complete, arrange with patient A.S.A.P. * * * *      Patient Name: Hugo Branham  Surgery Date: 7/15/2024  Medical Record: [id number]  CSN: [id number]  : 1965 - A: 58 y     Sex: male  Surgeon(s):  Carlos Manuel Aguilar DO  Procedure: EXCISION OF BACK CYSTS X3  Anesthesia Type: MAC     Surgeon: Carlos Manuel Aguilar DO     The following Testing and Time Line are REQUIRED PER ANESTHESIA     EKG READ AND SIGNED WITHIN   90 days  BMP (requires 4 hour fast) within  90 days      Thank You,   Sent by:CARLYN MERA

## (undated) NOTE — LETTER
09/20/18        [address]  [address] [address]      Dear Nataly Villanueva,    [facility] records indicate that you have outstanding lab work and or testing that was ordered for you and has not yet been completed:  Orders Placed This Encounter